# Patient Record
Sex: FEMALE | Race: WHITE | NOT HISPANIC OR LATINO | Employment: OTHER | URBAN - METROPOLITAN AREA
[De-identification: names, ages, dates, MRNs, and addresses within clinical notes are randomized per-mention and may not be internally consistent; named-entity substitution may affect disease eponyms.]

---

## 2018-08-05 ENCOUNTER — HOSPITAL ENCOUNTER (EMERGENCY)
Facility: HOSPITAL | Age: 68
Discharge: HOME | End: 2018-08-05
Attending: EMERGENCY MEDICINE
Payer: MEDICARE

## 2018-08-05 VITALS
HEART RATE: 94 BPM | HEIGHT: 66 IN | WEIGHT: 155 LBS | BODY MASS INDEX: 24.91 KG/M2 | DIASTOLIC BLOOD PRESSURE: 85 MMHG | OXYGEN SATURATION: 99 % | RESPIRATION RATE: 18 BRPM | SYSTOLIC BLOOD PRESSURE: 124 MMHG | TEMPERATURE: 98 F

## 2018-08-05 DIAGNOSIS — M79.672 PAIN OF LEFT HEEL: Primary | ICD-10-CM

## 2018-08-05 PROCEDURE — 99283 EMERGENCY DEPT VISIT LOW MDM: CPT | Mod: ,,, | Performed by: EMERGENCY MEDICINE

## 2018-08-05 PROCEDURE — 25000003 PHARM REV CODE 250: Performed by: EMERGENCY MEDICINE

## 2018-08-05 PROCEDURE — 99283 EMERGENCY DEPT VISIT LOW MDM: CPT | Mod: 25

## 2018-08-05 RX ORDER — IBUPROFEN 800 MG/1
800 TABLET ORAL EVERY 8 HOURS PRN
Qty: 20 TABLET | Refills: 0 | Status: SHIPPED | OUTPATIENT
Start: 2018-08-05 | End: 2020-03-10

## 2018-08-05 RX ORDER — METFORMIN HYDROCHLORIDE 1000 MG/1
1000 TABLET ORAL 2 TIMES DAILY WITH MEALS
COMMUNITY

## 2018-08-05 RX ORDER — IBUPROFEN 400 MG/1
800 TABLET ORAL
Status: COMPLETED | OUTPATIENT
Start: 2018-08-05 | End: 2018-08-05

## 2018-08-05 RX ORDER — ATORVASTATIN CALCIUM 10 MG/1
10 TABLET, FILM COATED ORAL DAILY
COMMUNITY

## 2018-08-05 RX ORDER — LOSARTAN POTASSIUM 50 MG/1
50 TABLET ORAL DAILY
COMMUNITY
End: 2019-04-24

## 2018-08-05 RX ADMIN — IBUPROFEN 800 MG: 400 TABLET, FILM COATED ORAL at 03:08

## 2018-08-05 NOTE — ED TRIAGE NOTES
Presents to ER with complaint of pain and swelling to her left foot and ankle for 2 weeks.  Denies injury.  Patient's name and date of birth checked and is correct.  LOC: The patient is awake, alert and aware of environment with an appropriate affect, the patient is oriented x 3 and speaking appropriately.  APPEARANCE: Patient resting comfortably and in no acute distress, patient is clean and well groomed, patient's clothing is properly fastened.  CARDIOVASCULAR:  Heart rate regular and even with no peripheral edema noted.  SKIN: The skin is warm and dry, patient has normal skin turgor and moist mucus membranes, skin intact, no breakdown or brusing noted. MUSKULOSKELETAL: Patient moving all extremities well, obvious swelling and deformities noted left foot.  RESPIRATORY: Airway is open and patent, respirations are spontaneous, patient has a normal effort and rate.

## 2018-08-05 NOTE — ED PROVIDER NOTES
"Encounter Date: 8/5/2018    SCRIBE #1 NOTE: IPeng, am scribing for, and in the presence of,  Dr. Haynes. I have scribed the following portions of the note - the APC attestation.   SCRIBE #2 NOTE: I, Sherry Marquita, am scribing for, and in the presence of,  Dr. Haynes . I have scribed the following portions of the note - Other sections scribed: Reassessment .     History     Chief Complaint   Patient presents with    Foot Pain     denies injury, started 2 weeks ago     Time seen by provider: 2:58 PM    This is a 68 y.o. female with co-morbidities of diabetes and hypertension who presents to the ED with complaint of atraumatic, aching left foot pain. Patient reports severe, throbbing pain that has been constant for 2 weeks. Patient states that the left foot pain is worst when she first stands up, described as a "stone" beneath her mid foot. Exacerbating factors include walking. She does not feel the pain is worst at any time of day. She has had similar pain in the past. Patient states taking ibuprofen only for the last few days.       The history is provided by the patient and medical records.     Review of patient's allergies indicates:  No Known Allergies  Past Medical History:   Diagnosis Date    Diabetes     Hypercholesteremia     Hypertension     Hypothyroidism     Kidney stones      Past Surgical History:   Procedure Laterality Date    ABDOMINAL HERNIA REPAIR      BELT ABDOMINOPLASTY      CHOLECYSTECTOMY      COLECTOMY      partial      No family history on file.  Social History   Substance Use Topics    Smoking status: Not on file    Smokeless tobacco: Not on file    Alcohol use Not on file     Review of Systems   Constitutional: Negative for chills, diaphoresis and fever.   Respiratory: Negative for shortness of breath.    Musculoskeletal: Positive for arthralgias.   Skin: Negative for wound.       Physical Exam     Initial Vitals [08/05/18 1332]   BP Pulse Resp Temp SpO2   124/85 94 " 18 98.4 °F (36.9 °C) 99 %      MAP       --         Physical Exam    Nursing note and vitals reviewed.  Constitutional: She appears well-developed and well-nourished. She is not diaphoretic. No distress.   Pulmonary/Chest: Breath sounds normal. No respiratory distress.   Musculoskeletal:   No calf asymmetry or tenderness. No tenderness along the medial or lateral malleolus. No tenderness at the base of the 5th metatarsal. No tenderness at the medial aspect of the heel as well as the inferior plantar aspect along the lateral heel. No gross deformities. Peripheral pulses are intact. Kwok's test is negative.    Neurological: She is alert and oriented to person, place, and time.         ED Course   Procedures  Labs Reviewed - No data to display       Imaging Results          X-Ray Ankle Complete Left (Final result)  Result time 08/05/18 16:03:45    Final result by Luther Alba III, MD (08/05/18 16:03:45)                 Impression:      See above      Electronically signed by: Luther Alba MD  Date:    08/05/2018  Time:    16:03             Narrative:    EXAMINATION:  XR ANKLE COMPLETE 3 VIEW LEFT    CLINICAL HISTORY:  Pain in left foot    FINDINGS:  No fracture dislocation bone destruction or OCD seen.                               X-Ray Foot Complete Left (Final result)  Result time 08/05/18 16:04:11    Final result by Luther Alba III, MD (08/05/18 16:04:11)                 Impression:      See above      Electronically signed by: Luther Alba MD  Date:    08/05/2018  Time:    16:04             Narrative:    EXAMINATION:  XR FOOT COMPLETE 3 VIEW LEFT    CLINICAL HISTORY:  Pain in left foot    FINDINGS:  There is DJD and spurs on the calcaneus.  No fracture dislocation bone destruction or coalition seen.                                 Medical Decision Making:   History:   Old Medical Records: I decided to obtain old medical records.  Initial Assessment:   68 y.o. female presents with left heel pain.  No trauma, doubt fracture, but will obtain plain films. Kwok's test is negative, doubt achilles tendon rupture. No evidence of abscess or hematoma, likely consistent with plantar fasciitis, will administer ice and NSAIDs.     Reassessment: X-Rays reviewed. Patient provided with NSAIDs, instructions to RICE, and follow up with Podiatry.   Clinical Tests:   Radiological Study: Ordered and Reviewed            Scribe Attestation:   Scribe #1: I performed the above scribed service and the documentation accurately describes the services I performed. I attest to the accuracy of the note.  Scribe #2: I performed the above scribed service and the documentation accurately describes the services I performed. I attest to the accuracy of the note.    Attending Attestation:           Physician Attestation for Scribe:      Comments: I, Dr. Phoenix Haynes, personally performed the services described in this documentation. All medical record entries made by the scribe were at my direction and in my presence.  I have reviewed the chart and agree that the record reflects my personal performance and is accurate and complete. Phoenix Haynes MD.  10:02 PM 08/05/2018                 Clinical Impression:   The encounter diagnosis was Pain of left heel.      Disposition:   Disposition: Discharged  Condition: Stable                        Phoenix Haynes MD  08/05/18 [id number]

## 2018-11-07 ENCOUNTER — OFFICE VISIT (OUTPATIENT)
Dept: SPORTS MEDICINE | Facility: CLINIC | Age: 68
End: 2018-11-07
Payer: MEDICARE

## 2018-11-07 ENCOUNTER — CLINICAL SUPPORT (OUTPATIENT)
Dept: CARDIOLOGY | Facility: CLINIC | Age: 68
End: 2018-11-07
Attending: FAMILY MEDICINE
Payer: MEDICARE

## 2018-11-07 ENCOUNTER — HOSPITAL ENCOUNTER (OUTPATIENT)
Dept: RADIOLOGY | Facility: HOSPITAL | Age: 68
Discharge: HOME | End: 2018-11-07
Attending: FAMILY MEDICINE
Payer: MEDICARE

## 2018-11-07 VITALS — WEIGHT: 155 LBS | HEIGHT: 66 IN | TEMPERATURE: 98 F | BODY MASS INDEX: 24.91 KG/M2

## 2018-11-07 DIAGNOSIS — R53.81 PHYSICAL DECONDITIONING: ICD-10-CM

## 2018-11-07 DIAGNOSIS — M25.562 PAIN IN BOTH KNEES, UNSPECIFIED CHRONICITY: ICD-10-CM

## 2018-11-07 DIAGNOSIS — M79.662 PAIN OF LEFT CALF: ICD-10-CM

## 2018-11-07 DIAGNOSIS — M25.561 PAIN IN BOTH KNEES, UNSPECIFIED CHRONICITY: ICD-10-CM

## 2018-11-07 DIAGNOSIS — M17.0 PRIMARY OSTEOARTHRITIS OF KNEES, BILATERAL: Primary | ICD-10-CM

## 2018-11-07 PROCEDURE — 20611 DRAIN/INJ JOINT/BURSA W/US: CPT | Mod: 50,S$GLB,, | Performed by: FAMILY MEDICINE

## 2018-11-07 PROCEDURE — 1101F PT FALLS ASSESS-DOCD LE1/YR: CPT | Mod: CPTII,S$GLB,, | Performed by: FAMILY MEDICINE

## 2018-11-07 PROCEDURE — 73564 X-RAY EXAM KNEE 4 OR MORE: CPT | Mod: 26,LT,, | Performed by: RADIOLOGY

## 2018-11-07 PROCEDURE — 99204 OFFICE O/P NEW MOD 45 MIN: CPT | Mod: 25,S$GLB,, | Performed by: FAMILY MEDICINE

## 2018-11-07 PROCEDURE — 93971 EXTREMITY STUDY: CPT | Mod: LT

## 2018-11-07 PROCEDURE — 73564 X-RAY EXAM KNEE 4 OR MORE: CPT | Mod: TC,50,FY,PO

## 2018-11-07 PROCEDURE — 93971 EXTREMITY STUDY: CPT | Mod: 26,LT,S$GLB, | Performed by: INTERNAL MEDICINE

## 2018-11-07 PROCEDURE — 99999 PR PBB SHADOW E&M-EST. PATIENT-LVL III: CPT | Mod: PBBFAC,,, | Performed by: FAMILY MEDICINE

## 2018-11-07 PROCEDURE — 73564 X-RAY EXAM KNEE 4 OR MORE: CPT | Mod: 26,RT,, | Performed by: RADIOLOGY

## 2018-11-07 RX ORDER — TRIAMCINOLONE ACETONIDE 40 MG/ML
40 INJECTION, SUSPENSION INTRA-ARTICULAR; INTRAMUSCULAR
Status: DISCONTINUED | OUTPATIENT
Start: 2018-11-07 | End: 2018-11-07 | Stop reason: HOSPADM

## 2018-11-07 RX ADMIN — TRIAMCINOLONE ACETONIDE 40 MG: 40 INJECTION, SUSPENSION INTRA-ARTICULAR; INTRAMUSCULAR at 09:11

## 2018-11-07 NOTE — PROCEDURES
"Large Joint Aspiration/Injection: R knee, L knee  Date/Time: 11/7/2018 9:59 AM  Performed by: Kendrick Freire MD  Authorized by: Kendrick Freire MD     Consent Done?:  Yes (Verbal)  Indications:  Pain  Procedure site marked: Yes    Timeout: Prior to procedure the correct patient, procedure, and site was verified      Location:  Knee  Site:  R knee and L knee  Prep: Patient was prepped and draped in usual sterile fashion    Ultrasonic Guidance for needle placement: Yes  Images are saved and documented.  Needle size:  20 G  Approach:  Lateral  Medications:  40 mg triamcinolone acetonide 40 mg/mL; 40 mg triamcinolone acetonide 40 mg/mL  Patient tolerance:  Patient tolerated the procedure well with no immediate complications    Additional Comments: Description of ultrasound utilization for needle guidance:   Ultrasound guidance used for needle localization. Images saved and stored for documentation. The knee joint was visualized. Dynamic visualization of the 20g x 1.5" needle was continuous throughout the procedure.      "

## 2018-11-07 NOTE — PROGRESS NOTES
Sagrario Freire, a 68 y.o. female, presents today for evaluation of her LEFT and RIGHT knee.      History of Present Illness (HPI)  Location: medial/posterior knee, L>R  Onset: Chronic, >2 years  Palliative:    Relative rest   Oral analgesics   CSI, non [facility] about 2 years ago  Provocative:    ADLS   Prolonged ambulation  Prior: none  Progression: Worsening discomfort  Quality:    sharp pain  Radiation: none  Severity: per nursing documentation  Timing: intermittent w/ use  Trauma: none    Review of Systems (ROS)  A 10+ review of systems was performed with pertinent positives and negatives noted above in the history of present illness. Other systems were negative unless otherwise specified.    Physical Examination (PE)  General:  The patient is alert and oriented x 3. Mood is pleasant. Observation of ears, eyes and nose reveal no gross abnormalities. HEENT: NCAT, sclera anicteric.   Lungs: Respirations are equal and unlabored.  Gait is coordinated. Patient can toe walk and heel walk without difficulty.    LEFT KNEE EXAMINATION    Observation/Inspection  Gait:   Nonantalgic   Alignment:  Neutral   Scars:   None   Muscle atrophy: Mild  Effusion:  None   Warmth:  None   Discoloration:   none     Tenderness / Crepitus (T / C):         T / C      T / C  Patella   - / -   Lateral joint line   - / -     Peripatellar medial  -  Medial joint line    + / -  Peripatellar lateral -  Medial plica   - / -  Patellar tendon -   Popliteal fossa   + / -  Quad tendon   -   Gastrocnemius   -  Prepatellar Bursa - / -   Quadricep   -  Tibial tubercle  -  Thigh/hamstring  -  Pes anserine/HS -  Fibula    -  ITB   - / -  Tibia     -  Tib/fib joint  - / -  LCL    -    MFC   - / -   MCL: Proximal  -    LFC   - / -   Distal    -          ROM: (* = pain)  PASSIVE   ACTIVE    Left :   5 / 0 / 145   5 / 0 / 145     Right :    5 / 0 / 145   5 / 0 / 145    Patellofemoral examination:  See above noted areas of tenderness.   Patella position     Subluxation / dislocation: Centered        Sup. / Inf;   Normal   Crepitus (PF):    Absent   Patellar Mobility:       Medial-lateral:   Normal    Superior-inferior:  Normal    Inferior tilt   Normal    Patellar tendon:  Normal   Lateral tilt:    Normal   J-sign:     None   Patellofemoral grind:   No pain     Meniscal Signs:     Pain on terminal extension:  +  Pain on terminal flexion:  +  Andressas maneuver:  +*  Squat     NT    Ligament Examination:  ACL / Lachman:  WNL  PCL-Post.  drawer: normal 0 to 2mm  MCL- Valgus:  normal 0 to 2mm  LCL- Varus:    normal 0 to 2mm  Pivot shift:  guarding   Dial Test:   difference c/w other side   At 30° flexion: normal (< 5°)    At 90° flexion: normal (< 5°)   Reverse Pivot Shift:   normal (Equal)     Strength: (* = with pain) Painful Side  Quadriceps   5/5  Hamstrin/5    Extremity Neuro-vascular Examination:   Sensation:  Grossly intact to light touch all dermatomal regions.   Motor Function:  Fully intact motor function at hip, knee, foot and ankle    DTRs;  quadriceps and  achilles 2+.  No clonus and downgoing Babinski.    Vascular status:  DP and PT pulses 2+, brisk capillary refill, symmetric.     Other Findings:    ASSESSMENT & PLAN  Assessment:   #1 Kellgren-Jeyson Grade III osteoarthritis of knee, edward med compartment, bilat  #2 calf pain, left   US blood flow study ordered today to r/o vascular    No evidence of neurologic pathology    Imaging studies reviewed:   X-ray knee, bilateral 18.11    Plan:    We discussed the importance of appropriate diet, weight, and regular exercise including quadriceps strengthening     We discussed options including:  #1 watchful waiting  #2 physical therapy aimed at:   Core stability   RoM knee   Strengthening quadriceps   Gait training   #3 injection therapy:   CSI iaknee     Right,     Left,    VSI iaknee    Right,     Left,    Orthobiologics   #4 consultation      The patient chooses #2 and #3 csi iaknee bilat    Pain  management: handout given  Bracing:   Physical therapy: fPT, @ [facility], begin as above   Activity (e.g. 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

## 2019-01-03 ENCOUNTER — OFFICE VISIT (OUTPATIENT)
Dept: SPORTS MEDICINE | Facility: CLINIC | Age: 69
End: 2019-01-03
Payer: MEDICARE

## 2019-01-03 VITALS — TEMPERATURE: 98 F | BODY MASS INDEX: 24.91 KG/M2 | HEIGHT: 66 IN | WEIGHT: 155 LBS

## 2019-01-03 DIAGNOSIS — M17.0 PRIMARY OSTEOARTHRITIS OF BOTH KNEES: Primary | ICD-10-CM

## 2019-01-03 DIAGNOSIS — M17.0 PRIMARY OSTEOARTHRITIS OF KNEES, BILATERAL: Primary | ICD-10-CM

## 2019-01-03 DIAGNOSIS — R53.81 PHYSICAL DECONDITIONING: ICD-10-CM

## 2019-01-03 PROCEDURE — 99999 PR PBB SHADOW E&M-EST. PATIENT-LVL III: ICD-10-PCS | Mod: PBBFAC,,, | Performed by: FAMILY MEDICINE

## 2019-01-03 PROCEDURE — 99999 PR PBB SHADOW E&M-EST. PATIENT-LVL III: CPT | Mod: PBBFAC,,, | Performed by: FAMILY MEDICINE

## 2019-01-03 PROCEDURE — 1101F PR PT FALLS ASSESS DOC 0-1 FALLS W/OUT INJ PAST YR: ICD-10-PCS | Mod: CPTII,S$GLB,, | Performed by: FAMILY MEDICINE

## 2019-01-03 PROCEDURE — 99214 PR OFFICE/OUTPT VISIT, EST, LEVL IV, 30-39 MIN: ICD-10-PCS | Mod: S$GLB,,, | Performed by: FAMILY MEDICINE

## 2019-01-03 PROCEDURE — 99214 OFFICE O/P EST MOD 30 MIN: CPT | Mod: S$GLB,,, | Performed by: FAMILY MEDICINE

## 2019-01-03 PROCEDURE — 1101F PT FALLS ASSESS-DOCD LE1/YR: CPT | Mod: CPTII,S$GLB,, | Performed by: FAMILY MEDICINE

## 2019-01-03 NOTE — PROGRESS NOTES
Sagrario Freire, a 68 y.o. female, presents today for evaluation of her LEFT and RIGHT knee.      History of Present Illness (HPI)  Location: medial/posterior knee, L>R  Onset: Chronic, >2 years  Palliative:    Relative rest   Oral analgesics   CSI, non [facility] about 2 years ago   CSI, [place], 18.11.07, 90% improvement   fPT @[facility] - has not started yet  Provocative: Resolved   ADLS   Prolonged ambulation  Prior: none  Progression: Resolving discomfort  Quality:    Resolved    sharp pain   Muscle spasms at night  Radiation: none  Severity: per nursing documentation  Timing: intermittent w/ use  Trauma: none    Review of Systems (ROS)  A 10+ review of systems was performed with pertinent positives and negatives noted above in the history of present illness. Other systems were negative unless otherwise specified.    Physical Examination (PE)  General:  The patient is alert and oriented x 3. Mood is pleasant. Observation of ears, eyes and nose reveal no gross abnormalities. HEENT: NCAT, sclera anicteric.   Lungs: Respirations are equal and unlabored.  Gait is coordinated. Patient can toe walk and heel walk without difficulty.    KIEL KNEE EXAMINATION    Observation/Inspection  Gait:   Nonantalgic   Alignment:  Neutral   Scars:   None   Muscle atrophy: Mild  Effusion:  None   Warmth:  None   Discoloration:   none     Tenderness / Crepitus (T / C):         T / C      T / C  Patella   - / -   Lateral joint line   - / -     Peripatellar medial  -  Medial joint line    + / - resolved  Peripatellar lateral -  Medial plica   - / -  Patellar tendon -   Popliteal fossa   + / - Resolved  Quad tendon   -   Gastrocnemius   -  Prepatellar Bursa - / -   Quadricep   -  Tibial tubercle  -  Thigh/hamstring  -  Pes anserine/HS -  Fibula    -  ITB   - / -  Tibia     -  Tib/fib joint  - / -  LCL    -    MFC   - / -   MCL: Proximal  -    LFC   - / -   Distal    -          ROM: (* = pain)  PASSIVE   ACTIVE    Left :   5 / 0 / 145   5 / 0 /  145     Right :    5 / 0 / 145   5 / 0 / 145    Patellofemoral examination:  See above noted areas of tenderness.   Patella position    Subluxation / dislocation: Centered        Sup. / Inf;   Normal   Crepitus (PF):    Absent   Patellar Mobility:       Medial-lateral:   Normal    Superior-inferior:  Normal    Inferior tilt   Normal    Patellar tendon:  Normal   Lateral tilt:    Normal   J-sign:     None   Patellofemoral grind:   No pain     Meniscal Signs:   RESOLVED  Pain on terminal extension:  +   Pain on terminal flexion:  +  Andressas maneuver:  +*  Squat     NT    Ligament Examination:  ACL / Lachman:  WNL  PCL-Post.  drawer: normal 0 to 2mm  MCL- Valgus:  normal 0 to 2mm  LCL- Varus:    normal 0 to 2mm  Pivot shift:  guarding   Dial Test:   difference c/w other side   At 30° flexion: normal (< 5°)    At 90° flexion: normal (< 5°)   Reverse Pivot Shift:   normal (Equal)     Strength: (* = with pain) Painful Side  Quadriceps   5/5  Hamstrin/5    Extremity Neuro-vascular Examination:   Sensation:  Grossly intact to light touch all dermatomal regions.   Motor Function:  Fully intact motor function at hip, knee, foot and ankle    DTRs;  quadriceps and  achilles 2+.  No clonus and downgoing Babinski.    Vascular status:  DP and PT pulses 2+, brisk capillary refill, symmetric.     Other Findings:    ASSESSMENT & PLAN  Assessment:   #1 Kellgren-Jeyson Grade III osteoarthritis of knee, edward med compartment, bilat  #2 calf pain, left   US negative on 18..07    No evidence of neurologic pathology    Imaging studies reviewed:   X-ray knee, bilateral     Plan:    We discussed the importance of appropriate diet, weight, and regular exercise including quadriceps strengthening     We discussed options including:  #1 watchful waiting  #2 physical therapy aimed at:   Core stability   RoM knee   Strengthening quadriceps   Gait training   #3 injection therapy:   ALISAI phil     Right, effective good%,  repeat    Left, effective good%, repeat   VSI iaknee    Right,     Left,    Orthobiologics   #4 consultation      The patient chooses #2    Pain management: handout given  Bracing:   Physical therapy: fPT, @ [facility], begin as above   Activity (e.g. 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

## 2019-01-15 ENCOUNTER — CLINICAL SUPPORT (OUTPATIENT)
Dept: REHABILITATION | Facility: HOSPITAL | Age: 69
End: 2019-01-15
Attending: FAMILY MEDICINE
Payer: MEDICARE

## 2019-01-15 DIAGNOSIS — M25.562 BILATERAL CHRONIC KNEE PAIN: ICD-10-CM

## 2019-01-15 DIAGNOSIS — G89.29 BILATERAL CHRONIC KNEE PAIN: ICD-10-CM

## 2019-01-15 DIAGNOSIS — M25.60 RANGE OF MOTION DEFICIT: ICD-10-CM

## 2019-01-15 DIAGNOSIS — R26.2 DIFFICULTY WALKING: ICD-10-CM

## 2019-01-15 DIAGNOSIS — M25.561 BILATERAL CHRONIC KNEE PAIN: ICD-10-CM

## 2019-01-15 DIAGNOSIS — R53.1 WEAKNESS: ICD-10-CM

## 2019-01-15 PROCEDURE — 97110 THERAPEUTIC EXERCISES: CPT

## 2019-01-15 PROCEDURE — 97161 PT EVAL LOW COMPLEX 20 MIN: CPT

## 2019-01-22 ENCOUNTER — CLINICAL SUPPORT (OUTPATIENT)
Dept: REHABILITATION | Facility: HOSPITAL | Age: 69
End: 2019-01-22
Attending: FAMILY MEDICINE
Payer: MEDICARE

## 2019-01-22 DIAGNOSIS — M25.562 BILATERAL CHRONIC KNEE PAIN: ICD-10-CM

## 2019-01-22 DIAGNOSIS — G89.29 BILATERAL CHRONIC KNEE PAIN: ICD-10-CM

## 2019-01-22 DIAGNOSIS — M25.60 RANGE OF MOTION DEFICIT: ICD-10-CM

## 2019-01-22 DIAGNOSIS — R53.1 WEAKNESS: ICD-10-CM

## 2019-01-22 DIAGNOSIS — M25.561 BILATERAL CHRONIC KNEE PAIN: ICD-10-CM

## 2019-01-22 DIAGNOSIS — R26.2 DIFFICULTY WALKING: ICD-10-CM

## 2019-01-22 PROCEDURE — 97110 THERAPEUTIC EXERCISES: CPT

## 2019-01-22 PROCEDURE — 97140 MANUAL THERAPY 1/> REGIONS: CPT

## 2019-01-22 NOTE — PROGRESS NOTES
"  Physical Therapy Daily Treatment Note     Name: Sagrario Freire  Clinic Number: [id number]    Therapy Diagnosis:   Encounter Diagnoses   Name Primary?    Bilateral chronic knee pain     Difficulty walking     Weakness     Range of motion deficit      Physician: Kendrick Freire, *    Visit Date: 1/22/2019    Physician Orders: PT Eval and Treat   Medical Diagnosis from Referral: M17.0 (ICD-10-CM) - Primary osteoarthritis of knees, bilateral  Evaluation Date: 1/15/2019  Authorization Period Expiration: 1/3/2020  Plan of Care Expiration: 2/26/2019  Visit # / Visits authorized: 2/ 1 (pending)      Time In: 10:10  Time Out: 11:10  Total Billable Time: 30 minutes     Precautions: Diabetes    Subjective     Pt reports: She felt well after her initial eval; no issues with HEP. Her (L) knee is still hurting, currenlty 3-4/10.  She was compliant with home exercise program.  Response to previous treatment: Tolerated well  Functional change: NA    Pain: 4/10  Location: left knee      Objective     Sagrario received therapeutic exercises to develop strength and flexibility for 22 minutes including:  - long-sitting HS stretch + quad set 5" hold 30x (B)  - supine SLR + QS 3x10 (B)  - SL clamshells 3x10 (B)  - supine bridging c pilates ring 3x10   - recumbent bike for increased ROM/circulation 5 min timed.     Sagrario received the following manual therapy techniques: Joint mobilizations and Soft tissue Mobilization were applied to the: (B) patellofemoral joint for 8 minutes, including:  - patellar assessment  - STM: (L) infrapatellar fat pad       Sagrario received cold pack for 0 minutes to L knee.      Home Exercises Provided and Patient Education Provided     Education provided:   - Review of POC  - exercise rationale.     Written Home Exercises Provided: Patient instructed to cont prior HEP.  Exercises were reviewed and Sagrario was able to demonstrate them prior to the end of the session.  Sagrario demonstrated good  " understanding of the education provided.     See EMR under Media for exercises provided prior visit.    Assessment     Initiated beginning exercise program to address hip and knee muscle strength deficits with moderate soreness/fatigue noted. No symptom aggravation noted during session. HEP will be updated next session after determining how patient responds to current treatment after leaving. Patient reported feeling well upon departure.       Sagrario is a 68 y.o. female referred to outpatient Physical Therapy with a medical diagnosis of M17.0 (ICD-10-CM) - Primary osteoarthritis of knees, bilateral. Pt presents with gait abnormalities, ROM restrictions, significant strength deficits of hip and knee musculature, and flexibility loss contributing to decreased functional mobility tolerance and increased pain.   Sagrario is progressing well towards her goals.   Pt prognosis is Good.     Pt will continue to benefit from skilled outpatient physical therapy to address the deficits listed in the problem list box on initial evaluation, provide pt/family education and to maximize pt's level of independence in the home and community environment.     Pt's spiritual, cultural and educational needs considered and pt agreeable to plan of care and goals.    Anticipated barriers to physical therapy: None    Goals:  Short Term Goals: 2 weeks   - Pt will demonstrate excellent knowledge and adherence to HEP to facilitate optimal recovery.     Long Term Goals: 6 weeks   - Pt will demonstrate hip muscle MMT of 4+/5 or greater to increased stability needed for functional activity.   - Pt will demonstrate knee muscle MMT of 5/5 or greater to increased stability needed for functional activity.   - Pt will demonstrate HS 90/90 on LLE of 20 deg lacking or less for increased mobility tolerance.   - Pt will report FOTO score of 50% limited or less indicating clinically relevant improvement in functional tolerance.     Plan     Continue to  progress per current plan as tolerated. Update HEP.     Estela Moreno, PT, DPT

## 2019-01-24 ENCOUNTER — CLINICAL SUPPORT (OUTPATIENT)
Dept: REHABILITATION | Facility: HOSPITAL | Age: 69
End: 2019-01-24
Attending: FAMILY MEDICINE
Payer: MEDICARE

## 2019-01-24 DIAGNOSIS — M25.561 BILATERAL CHRONIC KNEE PAIN: ICD-10-CM

## 2019-01-24 DIAGNOSIS — R53.1 WEAKNESS: ICD-10-CM

## 2019-01-24 DIAGNOSIS — M25.60 RANGE OF MOTION DEFICIT: ICD-10-CM

## 2019-01-24 DIAGNOSIS — R26.2 DIFFICULTY WALKING: ICD-10-CM

## 2019-01-24 DIAGNOSIS — G89.29 BILATERAL CHRONIC KNEE PAIN: ICD-10-CM

## 2019-01-24 DIAGNOSIS — M25.562 BILATERAL CHRONIC KNEE PAIN: ICD-10-CM

## 2019-01-24 PROCEDURE — 97140 MANUAL THERAPY 1/> REGIONS: CPT

## 2019-01-24 PROCEDURE — 97110 THERAPEUTIC EXERCISES: CPT

## 2019-01-24 NOTE — PROGRESS NOTES
"  Physical Therapy Daily Treatment Note     Name: Sagrario Freire  Clinic Number: [id number]    Therapy Diagnosis:   Encounter Diagnoses   Name Primary?    Bilateral chronic knee pain     Difficulty walking     Weakness     Range of motion deficit      Physician: Kendrick Freire, *    Visit Date: 1/24/2019    Physician Orders: PT Eval and Treat   Medical Diagnosis from Referral: M17.0 (ICD-10-CM) - Primary osteoarthritis of knees, bilateral  Evaluation Date: 1/15/2019  Authorization Period Expiration: 1/3/2020  Plan of Care Expiration: 2/26/2019  Visit # / Visits authorized: 3/ 1 (pending)      Time In: 10:00  Time Out: 10:50  Total Billable Time: 38 minutes     Precautions: Diabetes    Subjective     Pt reports: She was sore after last session, but she feels better today.   She was compliant with home exercise program.  Response to previous treatment: Tolerated well  Functional change: decreased pain, post-exercise soreness.     Pain: 2/10  Location: left knee      Objective     Sagrario received therapeutic exercises to develop strength and flexibility for 30 minutes including:  - long-sitting HS stretch + quad set 5" hold 30x (B)  - supine SLR + QS 3x10 (B)  - SL clamshells 3x10 (B)  - supine bridging c pilates ring 3x10   - recumbent bike for increased ROM/circulation 5 min timed.     Sagrario received the following manual therapy techniques: Joint mobilizations and Soft tissue Mobilization were applied to the: (B) patellofemoral joint for 8 minutes, including:  - patellar glides 4-way on (L)  - STM: (L) infrapatellar fat pad       Sagrario received cold pack for 0 minutes to L knee.      Home Exercises Provided and Patient Education Provided     Education provided:   - Review of POC  - exercise rationale.     Written Home Exercises Provided: Patient instructed to cont prior HEP.  Exercises were reviewed and Sagrario was able to demonstrate them prior to the end of the session.  Sagrario demonstrated good  " understanding of the education provided.     See EMR under Media for exercises provided prior visit.    Assessment     No progressions made to exercise program today due to pt being sore after last session; discussed progressing exercise program slowly starting next week. Improved tolerance to STM for fat pat noted compared to last session. Good patellar mobility of (L) knee noted. Patient reported feeling well upon departure.       Sagrario is a 68 y.o. female referred to outpatient Physical Therapy with a medical diagnosis of M17.0 (ICD-10-CM) - Primary osteoarthritis of knees, bilateral. Pt presents with gait abnormalities, ROM restrictions, significant strength deficits of hip and knee musculature, and flexibility loss contributing to decreased functional mobility tolerance and increased pain.   Sagrario is progressing well towards her goals.   Pt prognosis is Good.     Pt will continue to benefit from skilled outpatient physical therapy to address the deficits listed in the problem list box on initial evaluation, provide pt/family education and to maximize pt's level of independence in the home and community environment.     Pt's spiritual, cultural and educational needs considered and pt agreeable to plan of care and goals.    Anticipated barriers to physical therapy: None    Goals:  Short Term Goals: 2 weeks   - Pt will demonstrate excellent knowledge and adherence to HEP to facilitate optimal recovery.     Long Term Goals: 6 weeks   - Pt will demonstrate hip muscle MMT of 4+/5 or greater to increased stability needed for functional activity.   - Pt will demonstrate knee muscle MMT of 5/5 or greater to increased stability needed for functional activity.   - Pt will demonstrate HS 90/90 on LLE of 20 deg lacking or less for increased mobility tolerance.   - Pt will report FOTO score of 50% limited or less indicating clinically relevant improvement in functional tolerance.     Plan     Continue to progress per  current plan as tolerated. Update HEP.     Etsela Moreno, PT, DPT

## 2019-01-31 ENCOUNTER — CLINICAL SUPPORT (OUTPATIENT)
Dept: REHABILITATION | Facility: HOSPITAL | Age: 69
End: 2019-01-31
Attending: FAMILY MEDICINE
Payer: MEDICARE

## 2019-01-31 DIAGNOSIS — G89.29 BILATERAL CHRONIC KNEE PAIN: ICD-10-CM

## 2019-01-31 DIAGNOSIS — M25.60 RANGE OF MOTION DEFICIT: ICD-10-CM

## 2019-01-31 DIAGNOSIS — M25.561 BILATERAL CHRONIC KNEE PAIN: ICD-10-CM

## 2019-01-31 DIAGNOSIS — M25.562 BILATERAL CHRONIC KNEE PAIN: ICD-10-CM

## 2019-01-31 DIAGNOSIS — R53.1 WEAKNESS: ICD-10-CM

## 2019-01-31 DIAGNOSIS — R26.2 DIFFICULTY WALKING: ICD-10-CM

## 2019-01-31 PROCEDURE — 97110 THERAPEUTIC EXERCISES: CPT

## 2019-01-31 PROCEDURE — 97140 MANUAL THERAPY 1/> REGIONS: CPT

## 2019-01-31 NOTE — PROGRESS NOTES
"  Physical Therapy Daily Treatment Note     Name: Sagrario Freire  Clinic Number: [id number]    Therapy Diagnosis:   Encounter Diagnoses   Name Primary?    Bilateral chronic knee pain     Difficulty walking     Weakness     Range of motion deficit      Physician: Kendrick Freire, *    Visit Date: 1/31/2019    Physician Orders: PT Eval and Treat   Medical Diagnosis from Referral: M17.0 (ICD-10-CM) - Primary osteoarthritis of knees, bilateral  Evaluation Date: 1/15/2019  Authorization Period Expiration: 1/3/2020  Plan of Care Expiration: 2/26/2019  Visit # / Visits authorized: 4/ 1 (pending)      Time In: 10:00  Time Out: 10:50  Total Billable Time: 38 minutes     Precautions: Diabetes    Subjective     Pt reports: She has been sore since her last session, mainly in her (L) hamstring and occasionally below (L) patella, causing her to have difficulty walking at [place].    She was compliant with home exercise program.  Response to previous treatment: Tolerated well  Functional change: decreased pain, post-exercise soreness.     Pain: NT  Location: left knee      Objective     Sagrario received therapeutic exercises to develop strength and flexibility for 15 minutes including:    - HS stretch at step 3x30 sec B   - quad set (without HS stretch) 30x  - SAQ on bolster 20x    - long-sitting HS stretch + quad set 5" hold 30x (B)  - supine SLR + QS 3x10 (B)  - SL clamshells 3x10 (B)  - supine bridging c pilates ring 3x10   - recumbent bike for increased ROM/circulation 5 min timed.     Sagrario received the following manual therapy techniques: Joint mobilizations and Soft tissue Mobilization were applied to the: L HS for 30 minutes, including:  - IASTM/STM/sustained pressure: sup/mod tx-depth (L) biceps femoris.       Sagrario received cold pack for 10 minutes to L knee.      Home Exercises Provided and Patient Education Provided     Education provided:   - Review of POC  - exercise rationale.     Written Home " Exercises Provided: Patient instructed to cont prior HEP.  Exercises were reviewed and Sagrario was able to demonstrate them prior to the end of the session.  Sagrario demonstrated good  understanding of the education provided.     See EMR under Media for exercises provided prior visit.    Assessment     Decreased exercise volume today due to increased pain. Significant hypertonicity and soreness noted in (L) biceps femoris, addressed with sup/mod tx-depth STM/IASTM to promote nervous system modulation. Mild pain noted with exercises; advised patient to focus on quad sets and HS stretch to HEP. Utilized ice to decreased post-exercise soreness and pain. No increased symptoms upon departure.       Sagrario is a 68 y.o. female referred to outpatient Physical Therapy with a medical diagnosis of M17.0 (ICD-10-CM) - Primary osteoarthritis of knees, bilateral. Pt presents with gait abnormalities, ROM restrictions, significant strength deficits of hip and knee musculature, and flexibility loss contributing to decreased functional mobility tolerance and increased pain.   Sagrario is progressing well towards her goals.   Pt prognosis is Good.     Pt will continue to benefit from skilled outpatient physical therapy to address the deficits listed in the problem list box on initial evaluation, provide pt/family education and to maximize pt's level of independence in the home and community environment.     Pt's spiritual, cultural and educational needs considered and pt agreeable to plan of care and goals.    Anticipated barriers to physical therapy: None    Goals:  Short Term Goals: 2 weeks   - Pt will demonstrate excellent knowledge and adherence to HEP to facilitate optimal recovery.     Long Term Goals: 6 weeks   - Pt will demonstrate hip muscle MMT of 4+/5 or greater to increased stability needed for functional activity.   - Pt will demonstrate knee muscle MMT of 5/5 or greater to increased stability needed for functional  activity.   - Pt will demonstrate HS 90/90 on LLE of 20 deg lacking or less for increased mobility tolerance.   - Pt will report FOTO score of 50% limited or less indicating clinically relevant improvement in functional tolerance.     Plan     Reassess pain levels, progress exercise as tolerated.     Estela Moreno, PT, DPT

## 2019-02-05 ENCOUNTER — CLINICAL SUPPORT (OUTPATIENT)
Dept: REHABILITATION | Facility: HOSPITAL | Age: 69
End: 2019-02-05
Attending: FAMILY MEDICINE
Payer: MEDICARE

## 2019-02-05 DIAGNOSIS — M25.562 BILATERAL CHRONIC KNEE PAIN: ICD-10-CM

## 2019-02-05 DIAGNOSIS — G89.29 BILATERAL CHRONIC KNEE PAIN: ICD-10-CM

## 2019-02-05 DIAGNOSIS — M25.561 BILATERAL CHRONIC KNEE PAIN: ICD-10-CM

## 2019-02-05 DIAGNOSIS — R26.2 DIFFICULTY WALKING: ICD-10-CM

## 2019-02-05 DIAGNOSIS — R53.1 WEAKNESS: ICD-10-CM

## 2019-02-05 DIAGNOSIS — M25.60 RANGE OF MOTION DEFICIT: ICD-10-CM

## 2019-02-05 PROCEDURE — 97140 MANUAL THERAPY 1/> REGIONS: CPT

## 2019-02-05 PROCEDURE — 97110 THERAPEUTIC EXERCISES: CPT

## 2019-02-05 NOTE — PROGRESS NOTES
"  Physical Therapy Daily Treatment Note     Name: Sagrario Freire  Clinic Number: [id number]    Therapy Diagnosis:   Encounter Diagnoses   Name Primary?    Bilateral chronic knee pain     Difficulty walking     Weakness     Range of motion deficit      Physician: Kendrick Freire, *    Visit Date: 2/5/2019    Physician Orders: PT Eval and Treat   Medical Diagnosis from Referral: M17.0 (ICD-10-CM) - Primary osteoarthritis of knees, bilateral  Evaluation Date: 1/15/2019  Authorization Period Expiration: 1/3/2020  Plan of Care Expiration: 2/26/2019  Visit # / Visits authorized: 5/ 1 (pending)      Time In: 10:00  Time Out: 10:50  Total Billable Time: 30 minutes     Precautions: Diabetes    Subjective     Pt reports: She has been feeling a lot better. She was able to go to [employer] and she felt better after she'd walked around more.   She was compliant with home exercise program.  Response to previous treatment: Tolerated well  Functional change: decreased pain, post-exercise soreness.     Pain: NT  Location: left knee      Objective     Sagrario received therapeutic exercises to develop strength and flexibility for 15 minutes including:    - HS stretch at step 3x30 sec B   - quad set (without HS stretch) 30x  - supine bridging 3x10   - supine SLR + QS 3x10 (B)  - recumbent bike for increased ROM/circulation 8 min timed.     - long-sitting HS stretch + quad set 5" hold 30x (B)  - SL clamshells 3x10 (B)  - supine bridging 3x10       Sagrario received the following manual therapy techniques: Joint mobilizations and Soft tissue Mobilization were applied to the: L HS for 15 minutes, including:  - IASTM/STM/sustained pressure: sup/mod tx-depth (L) biceps femoris.       Sagrario received cold pack for 10 minutes to L knee.      Home Exercises Provided and Patient Education Provided     Education provided:   - Review of POC  - exercise rationale.     Written Home Exercises Provided: Patient instructed to cont prior " HEP.  Exercises were reviewed and Sagrario was able to demonstrate them prior to the end of the session.  Sagrario demonstrated good  understanding of the education provided.     See EMR under Media for exercises provided prior visit.    Assessment     Session started with IASTM and STM/muscle play for decreased HS muscle tone on LLE with moderate tx-depth tolerated. Slight increase in exercise volume performed today with good tolerance noted. VC provided for 50% effort with quad sets to decrease pain. Slight L knee discomfort reported at end of session but no significant symptom provocation reported at end of session.       Sagrario is a 68 y.o. female referred to outpatient Physical Therapy with a medical diagnosis of M17.0 (ICD-10-CM) - Primary osteoarthritis of knees, bilateral. Pt presents with gait abnormalities, ROM restrictions, significant strength deficits of hip and knee musculature, and flexibility loss contributing to decreased functional mobility tolerance and increased pain.   Sagrario is progressing well towards her goals.   Pt prognosis is Good.     Pt will continue to benefit from skilled outpatient physical therapy to address the deficits listed in the problem list box on initial evaluation, provide pt/family education and to maximize pt's level of independence in the home and community environment.     Pt's spiritual, cultural and educational needs considered and pt agreeable to plan of care and goals.    Anticipated barriers to physical therapy: None    Goals:  Short Term Goals: 2 weeks   - Pt will demonstrate excellent knowledge and adherence to HEP to facilitate optimal recovery.     Long Term Goals: 6 weeks   - Pt will demonstrate hip muscle MMT of 4+/5 or greater to increased stability needed for functional activity.   - Pt will demonstrate knee muscle MMT of 5/5 or greater to increased stability needed for functional activity.   - Pt will demonstrate HS 90/90 on LLE of 20 deg lacking or less  for increased mobility tolerance.   - Pt will report FOTO score of 50% limited or less indicating clinically relevant improvement in functional tolerance.     Plan     Progress exercise as tolerated.     Estela Moreno, PT, DPT

## 2019-02-07 ENCOUNTER — CLINICAL SUPPORT (OUTPATIENT)
Dept: REHABILITATION | Facility: HOSPITAL | Age: 69
End: 2019-02-07
Attending: FAMILY MEDICINE
Payer: MEDICARE

## 2019-02-07 DIAGNOSIS — M25.561 BILATERAL CHRONIC KNEE PAIN: ICD-10-CM

## 2019-02-07 DIAGNOSIS — R26.2 DIFFICULTY WALKING: ICD-10-CM

## 2019-02-07 DIAGNOSIS — M25.562 BILATERAL CHRONIC KNEE PAIN: ICD-10-CM

## 2019-02-07 DIAGNOSIS — R53.1 WEAKNESS: ICD-10-CM

## 2019-02-07 DIAGNOSIS — M25.60 RANGE OF MOTION DEFICIT: ICD-10-CM

## 2019-02-07 DIAGNOSIS — G89.29 BILATERAL CHRONIC KNEE PAIN: ICD-10-CM

## 2019-02-07 PROCEDURE — 97110 THERAPEUTIC EXERCISES: CPT

## 2019-02-07 PROCEDURE — 97140 MANUAL THERAPY 1/> REGIONS: CPT

## 2019-02-07 NOTE — PROGRESS NOTES
"  Physical Therapy Daily Treatment Note     Name: Sagrario Freire  Clinic Number: [id number]    Therapy Diagnosis:   Encounter Diagnoses   Name Primary?    Bilateral chronic knee pain     Difficulty walking     Weakness     Range of motion deficit      Physician: Kendrick Freire, *    Visit Date: 2/7/2019    Physician Orders: PT Eval and Treat   Medical Diagnosis from Referral: M17.0 (ICD-10-CM) - Primary osteoarthritis of knees, bilateral  Evaluation Date: 1/15/2019  Authorization Period Expiration: 1/3/2020  Plan of Care Expiration: 2/26/2019  Visit # / Visits authorized: 6/ 1 (pending)      Time In: 10:00  Time Out: 10:50  Total Billable Time: 30 minutes     Precautions: Diabetes    Subjective     Pt reports: She has been continuing to feel better.  She was compliant with home exercise program.  Response to previous treatment: Tolerated well  Functional change: decreased pain, post-exercise soreness.     Pain: NT  Location: left knee      Objective     Sagrario received therapeutic exercises to develop strength and flexibility for 15 minutes including:    - HS stretch at step 3x30 sec B   - quad set (without HS stretch) 30x  - supine bridging 3x10   - supine SLR + QS 3x10 (B)  - SL clamshells 3x10 (B)  - recumbent bike for increased ROM/circulation 8 min timed.     - long-sitting HS stretch + quad set 5" hold 30x (B)  - SL clamshells 3x10 (B)  - supine bridging 3x10       Sagrario received the following manual therapy techniques: Joint mobilizations and Soft tissue Mobilization were applied to the: L HS for 15 minutes, including:  - IASTM/STM/sustained pressure: sup/mod tx-depth (L) biceps femoris.       Sagrario received cold pack for 10 minutes to L knee.      Home Exercises Provided and Patient Education Provided     Education provided:   - Review of POC  - exercise rationale.     Written Home Exercises Provided: Patient instructed to cont prior HEP.  Exercises were reviewed and Sagrario was able to " demonstrate them prior to the end of the session.  Sagrario demonstrated good  understanding of the education provided.     See EMR under Media for exercises provided prior visit.    Assessment     Continued with IASTM and STM/muscle play at start of session for decreased HS muscle tone on LLE with moderate tx-depth tolerated. Slight increase in exercise volume performed today with inclusion of SL clamshells good tolerance noted. Patient reported feeling well upon departure.     Sagrario is a 68 y.o. female referred to outpatient Physical Therapy with a medical diagnosis of M17.0 (ICD-10-CM) - Primary osteoarthritis of knees, bilateral. Pt presents with gait abnormalities, ROM restrictions, significant strength deficits of hip and knee musculature, and flexibility loss contributing to decreased functional mobility tolerance and increased pain.   Sagrario is progressing well towards her goals.   Pt prognosis is Good.     Pt will continue to benefit from skilled outpatient physical therapy to address the deficits listed in the problem list box on initial evaluation, provide pt/family education and to maximize pt's level of independence in the home and community environment.     Pt's spiritual, cultural and educational needs considered and pt agreeable to plan of care and goals.    Anticipated barriers to physical therapy: None    Goals:  Short Term Goals: 2 weeks   - Pt will demonstrate excellent knowledge and adherence to HEP to facilitate optimal recovery.     Long Term Goals: 6 weeks   - Pt will demonstrate hip muscle MMT of 4+/5 or greater to increased stability needed for functional activity.   - Pt will demonstrate knee muscle MMT of 5/5 or greater to increased stability needed for functional activity.   - Pt will demonstrate HS 90/90 on LLE of 20 deg lacking or less for increased mobility tolerance.   - Pt will report FOTO score of 50% limited or less indicating clinically relevant improvement in functional  tolerance.     Plan     Progress exercise as tolerated.     Estela Moreno, PT, DPT

## 2019-02-12 ENCOUNTER — CLINICAL SUPPORT (OUTPATIENT)
Dept: REHABILITATION | Facility: HOSPITAL | Age: 69
End: 2019-02-12
Attending: FAMILY MEDICINE
Payer: MEDICARE

## 2019-02-12 DIAGNOSIS — M25.60 RANGE OF MOTION DEFICIT: ICD-10-CM

## 2019-02-12 DIAGNOSIS — M25.561 BILATERAL CHRONIC KNEE PAIN: ICD-10-CM

## 2019-02-12 DIAGNOSIS — R53.1 WEAKNESS: ICD-10-CM

## 2019-02-12 DIAGNOSIS — G89.29 BILATERAL CHRONIC KNEE PAIN: ICD-10-CM

## 2019-02-12 DIAGNOSIS — R26.2 DIFFICULTY WALKING: ICD-10-CM

## 2019-02-12 DIAGNOSIS — M25.562 BILATERAL CHRONIC KNEE PAIN: ICD-10-CM

## 2019-02-12 PROCEDURE — 97110 THERAPEUTIC EXERCISES: CPT

## 2019-02-12 PROCEDURE — 97140 MANUAL THERAPY 1/> REGIONS: CPT

## 2019-02-12 NOTE — PROGRESS NOTES
"  Physical Therapy Daily Treatment Note     Name: Sagrario Freire  Clinic Number: [id number]    Therapy Diagnosis:   Encounter Diagnoses   Name Primary?    Bilateral chronic knee pain     Difficulty walking     Weakness     Range of motion deficit      Physician: Kendrick Freire, *    Visit Date: 2/12/2019    Physician Orders: PT Eval and Treat   Medical Diagnosis from Referral: M17.0 (ICD-10-CM) - Primary osteoarthritis of knees, bilateral  Evaluation Date: 1/15/2019  Authorization Period Expiration: 1/3/2020  Plan of Care Expiration: 2/26/2019  Visit # / Visits authorized: 7/ 1 (pending)      Time In: 10:00  Time Out: 10:53  Total Billable Time: 53 minutes     Precautions: Diabetes    Subjective     Pt reports: She has been continuing to feel better, although her L knee has ached some the last few days. She has still not had any 'thaddeus horses' in her HS.   She was compliant with home exercise program.  Response to previous treatment: Tolerated well  Functional change: decreased pain, post-exercise soreness.     Pain: NT  Location: left knee      Objective     Sagrario received therapeutic exercises to develop strength and flexibility for 45 minutes including:    - HS stretch (long-sitting) 3x30 sec B   - quad set (without HS stretch) 30x  - supine bridging 3x10   - supine SLR + QS 3x10 (B)  - SL clamshells 3x10 (B)  - recumbent bike for increased ROM/circulation 8 min timed.     - long-sitting HS stretch + quad set 5" hold 30x (B)  - SL clamshells 3x10 (B)  - supine bridging 3x10       Sagrario received the following manual therapy techniques: Joint mobilizations and Soft tissue Mobilization were applied to the: L HS for 8 minutes, including:  - STM/sustained pressure: sup/mod tx-depth (L) biceps femoris and vastus lateralis      Sagrario received cold pack for 10 minutes to L knee.      Home Exercises Provided and Patient Education Provided     Education provided:   - Review of POC  - exercise rationale. "     Written Home Exercises Provided: Patient instructed to cont prior HEP.  Exercises were reviewed and Sagrario was able to demonstrate them prior to the end of the session.  Sagrario demonstrated good  understanding of the education provided.     See EMR under Media for exercises provided prior visit.    Assessment     Continued with STM/muscle play at start of session for decreased HS and VL muscle tone on LLE with moderate tx-depth tolerated; noted release of vastus lateralis with sustained pressure. Modified HS stretch from step to long-sitting to decrease knee discomfort. Patient reported feeling well upon departure.     Sagrario is a 68 y.o. female referred to outpatient Physical Therapy with a medical diagnosis of M17.0 (ICD-10-CM) - Primary osteoarthritis of knees, bilateral. Pt presents with gait abnormalities, ROM restrictions, significant strength deficits of hip and knee musculature, and flexibility loss contributing to decreased functional mobility tolerance and increased pain.   Sagrario is progressing well towards her goals.   Pt prognosis is Good.     Pt will continue to benefit from skilled outpatient physical therapy to address the deficits listed in the problem list box on initial evaluation, provide pt/family education and to maximize pt's level of independence in the home and community environment.     Pt's spiritual, cultural and educational needs considered and pt agreeable to plan of care and goals.    Anticipated barriers to physical therapy: None    Goals:  Short Term Goals: 2 weeks   - Pt will demonstrate excellent knowledge and adherence to HEP to facilitate optimal recovery. (achieved)     Long Term Goals: 6 weeks   - Pt will demonstrate hip muscle MMT of 4+/5 or greater to increased stability needed for functional activity.   - Pt will demonstrate knee muscle MMT of 5/5 or greater to increased stability needed for functional activity.   - Pt will demonstrate HS 90/90 on LLE of 20 deg  lacking or less for increased mobility tolerance.   - Pt will report FOTO score of 50% limited or less indicating clinically relevant improvement in functional tolerance.     Plan     Progress exercise as tolerated.     Estela Moreno, PT, DPT

## 2019-04-22 ENCOUNTER — TELEPHONE (OUTPATIENT)
Dept: SPINE | Facility: CLINIC | Age: 69
End: 2019-04-22

## 2019-04-22 ENCOUNTER — TELEPHONE (OUTPATIENT)
Dept: SPORTS MEDICINE | Facility: CLINIC | Age: 69
End: 2019-04-22

## 2019-04-22 ENCOUNTER — OFFICE VISIT (OUTPATIENT)
Dept: SPORTS MEDICINE | Facility: CLINIC | Age: 69
End: 2019-04-22
Payer: MEDICARE

## 2019-04-22 VITALS — WEIGHT: 155 LBS | TEMPERATURE: 98 F | BODY MASS INDEX: 24.91 KG/M2 | HEIGHT: 66 IN

## 2019-04-22 DIAGNOSIS — M54.5 CHRONIC LOW BACK PAIN, UNSPECIFIED BACK PAIN LATERALITY, WITH SCIATICA PRESENCE UNSPECIFIED: Primary | ICD-10-CM

## 2019-04-22 DIAGNOSIS — M17.12 PRIMARY OSTEOARTHRITIS OF LEFT KNEE: Primary | ICD-10-CM

## 2019-04-22 DIAGNOSIS — G89.29 CHRONIC LOW BACK PAIN, UNSPECIFIED BACK PAIN LATERALITY, WITH SCIATICA PRESENCE UNSPECIFIED: Primary | ICD-10-CM

## 2019-04-22 PROCEDURE — 1101F PR PT FALLS ASSESS DOC 0-1 FALLS W/OUT INJ PAST YR: ICD-10-PCS | Mod: CPTII,S$GLB,, | Performed by: FAMILY MEDICINE

## 2019-04-22 PROCEDURE — 99214 OFFICE O/P EST MOD 30 MIN: CPT | Mod: 25,S$GLB,, | Performed by: FAMILY MEDICINE

## 2019-04-22 PROCEDURE — 20611 LARGE JOINT ASPIRATION/INJECTION: L KNEE: ICD-10-PCS | Mod: LT,S$GLB,, | Performed by: FAMILY MEDICINE

## 2019-04-22 PROCEDURE — 20611 DRAIN/INJ JOINT/BURSA W/US: CPT | Mod: LT,S$GLB,, | Performed by: FAMILY MEDICINE

## 2019-04-22 PROCEDURE — 99214 PR OFFICE/OUTPT VISIT, EST, LEVL IV, 30-39 MIN: ICD-10-PCS | Mod: 25,S$GLB,, | Performed by: FAMILY MEDICINE

## 2019-04-22 PROCEDURE — 99999 PR PBB SHADOW E&M-EST. PATIENT-LVL III: ICD-10-PCS | Mod: PBBFAC,,, | Performed by: FAMILY MEDICINE

## 2019-04-22 PROCEDURE — 99999 PR PBB SHADOW E&M-EST. PATIENT-LVL III: CPT | Mod: PBBFAC,,, | Performed by: FAMILY MEDICINE

## 2019-04-22 PROCEDURE — 1101F PT FALLS ASSESS-DOCD LE1/YR: CPT | Mod: CPTII,S$GLB,, | Performed by: FAMILY MEDICINE

## 2019-04-22 RX ORDER — TRIAMCINOLONE ACETONIDE 40 MG/ML
40 INJECTION, SUSPENSION INTRA-ARTICULAR; INTRAMUSCULAR
Status: DISCONTINUED | OUTPATIENT
Start: 2019-04-22 | End: 2019-04-22 | Stop reason: HOSPADM

## 2019-04-22 RX ADMIN — TRIAMCINOLONE ACETONIDE 40 MG: 40 INJECTION, SUSPENSION INTRA-ARTICULAR; INTRAMUSCULAR at 01:04

## 2019-04-22 NOTE — TELEPHONE ENCOUNTER
----- Message from Callie Multani RN sent at 4/22/2019  3:59 PM CDT -----  Regarding: FW: Appointment  Please offer her tomorrow or Wed with Srtacy  ----- Message -----  From: Kev Aguilar MA  Sent: 4/22/2019   3:52 PM  To: [facility] Clinical Support  Subject: Appointment                                      We saw this patient today and would like her to have an appointment with back and spine to be evaluated.    Can you please call the patient and schedule?    Thank you,    Kev Aguilar   Sports Medicine Assistant   [facility]

## 2019-04-22 NOTE — TELEPHONE ENCOUNTER
----- Message from Callie Multani RN sent at 4/22/2019  3:59 PM CDT -----  Regarding: RE: Appointment  The schedule is open. There is a lot of access tomorrow and Wed.  ----- Message -----  From: Kev Aguilar MA  Sent: 4/22/2019   3:52 PM  To: [facility] Clinical Support  Subject: Appointment                                      We saw this patient today and would like her to have an appointment with back and spine to be evaluated.    Can you please call the patient and schedule?    Thank you,    Kev Aguilar   Sports Medicine Assistant   [facility] [place]

## 2019-04-22 NOTE — PROGRESS NOTES
Sagrario Freire, a 68 y.o. female, presents today for evaluation of her LEFT and RIGHT knee.      History of Present Illness (HPI)  Location: medial/posterior knee, L>R  Onset: Chronic, >2 years  Palliative:    Relative rest   Oral analgesics   CSI, non [facility] about 2 years ago   CSI, L/R [place], 18.11.07, 90% improvement   fPT, @[facility] - 19.01.15 - 19.02.12 , minimal Improvement    Provocative: Resolved   ADLS   Prolonged ambulation  Prior: none  Progression: Resolving discomfort  Quality:    Resolved    sharp pain   Muscle spasms at night  Radiation: none  Severity: per nursing documentation  Timing: intermittent w/ use  Trauma: none    Review of Systems (ROS)  A 10+ review of systems was performed with pertinent positives and negatives noted above in the history of present illness. Other systems were negative unless otherwise specified.    Physical Examination (PE)  General:  The patient is alert and oriented x 3. Mood is pleasant. Observation of ears, eyes and nose reveal no gross abnormalities. HEENT: NCAT, sclera anicteric.   Lungs: Respirations are equal and unlabored.  Gait is coordinated. Patient can toe walk and heel walk without difficulty.    KIEL KNEE EXAMINATION    Observation/Inspection  Gait:   Nonantalgic   Alignment:  Neutral   Scars:   None   Muscle atrophy: Mild  Effusion:  None   Warmth:  None   Discoloration:   none     Tenderness / Crepitus (T / C):         T / C      T / C  Patella   - / -   Lateral joint line   - / -     Peripatellar medial  -  Medial joint line    + / - resolved  Peripatellar lateral -  Medial plica   - / -  Patellar tendon -   Popliteal fossa   + / - Resolved  Quad tendon   -   Gastrocnemius   -  Prepatellar Bursa - / -   Quadricep   -  Tibial tubercle  -  Thigh/hamstring  -  Pes anserine/HS -  Fibula    -  ITB   - / -  Tibia     -  Tib/fib joint  - / -  LCL    -    MFC   - / -   MCL: Proximal  -    LFC   - / -   Distal    -          ROM: (* = pain)  PASSIVE   ACTIVE     Left :   5 / 0 / 145   5 / 0 / 145     Right :    5 / 0 / 145   5 / 0 / 145    Patellofemoral examination:  See above noted areas of tenderness.   Patella position    Subluxation / dislocation: Centered        Sup. / Inf;   Normal   Crepitus (PF):    Absent   Patellar Mobility:       Medial-lateral:   Normal    Superior-inferior:  Normal    Inferior tilt   Normal    Patellar tendon:  Normal   Lateral tilt:    Normal   J-sign:     None   Patellofemoral grind:   No pain     Meniscal Signs:   RESOLVED  Pain on terminal extension:  +   Pain on terminal flexion:  +  Andressas maneuver:  +*  Squat     NT    Ligament Examination:  ACL / Lachman:  WNL  PCL-Post.  drawer: normal 0 to 2mm  MCL- Valgus:  normal 0 to 2mm  LCL- Varus:    normal 0 to 2mm  Pivot shift:  guarding   Dial Test:   difference c/w other side   At 30° flexion: normal (< 5°)    At 90° flexion: normal (< 5°)   Reverse Pivot Shift:   normal (Equal)     Strength: (* = with pain) Painful Side  Quadriceps   5/5  Hamstrin/5    Extremity Neuro-vascular Examination:   Sensation:  Grossly intact to light touch all dermatomal regions.   Motor Function:  Fully intact motor function at hip, knee, foot and ankle    DTRs;  quadriceps and  achilles 2+.  No clonus and downgoing Babinski.    Vascular status:  DP and PT pulses 2+, brisk capillary refill, symmetric.     Other Findings:    ASSESSMENT & PLAN  Assessment:   #1 Kellgren-Jeyson Grade III osteoarthritis of knee, edward med compartment, bilat  #2 prior calf pain, left   US negative on .07    No evidence of neurologic pathology    Imaging studies reviewed:   X-ray knee, bilateral     Plan:    We discussed the importance of appropriate diet, weight, and regular exercise including quadriceps strengthening     We discussed options including:  #1 watchful waiting  #2 re start physical therapy aimed at:   Core stability   RoM knee   Strengthening quadriceps   Gait training   #3 injection  therapy:   CSI i[place]nee     Right, effective good%, repeat    Left, effective good%, repeat   VSI [place]    Right,     Left,    Orthobiologics   #4 consultation      The patient chooses #3 csi iaknee left    Pain management: handout given  Bracing:   Physical therapy: fPTERELL, @ [facility], prior as above, modest experience this past time  Activity (e.g. 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

## 2019-04-22 NOTE — PROCEDURES
"Large Joint Aspiration/Injection: L knee  Date/Time: 4/22/2019 1:52 PM  Performed by: Kendrick Freire MD  Authorized by: Kendrick Freire MD     Consent Done?:  Yes (Verbal)  Indications:  Pain  Procedure site marked: Yes    Timeout: Prior to procedure the correct patient, procedure, and site was verified      Location:  Knee  Site:  L knee  Prep: Patient was prepped and draped in usual sterile fashion    Ultrasonic Guidance for needle placement: Yes  Images are saved and documented.  Needle size:  20 G  Approach:  Lateral  Medications:  40 mg triamcinolone acetonide 40 mg/mL  Patient tolerance:  Patient tolerated the procedure well with no immediate complications    Additional Comments: Description of ultrasound utilization for needle guidance:   Ultrasound guidance used for needle localization. Images saved and stored for documentation. The knee joint was visualized. Dynamic visualization of the 20g x 3.5" needle was continuous throughout the procedure.      "

## 2019-04-24 ENCOUNTER — OFFICE VISIT (OUTPATIENT)
Dept: SPINE | Facility: CLINIC | Age: 69
End: 2019-04-24
Payer: MEDICARE

## 2019-04-24 ENCOUNTER — HOSPITAL ENCOUNTER (OUTPATIENT)
Dept: RADIOLOGY | Facility: OTHER | Age: 69
Discharge: HOME | End: 2019-04-24
Attending: PHYSICIAN ASSISTANT
Payer: MEDICARE

## 2019-04-24 ENCOUNTER — TELEPHONE (OUTPATIENT)
Dept: SPINE | Facility: CLINIC | Age: 69
End: 2019-04-24

## 2019-04-24 VITALS — SYSTOLIC BLOOD PRESSURE: 118 MMHG | HEART RATE: 90 BPM | DIASTOLIC BLOOD PRESSURE: 91 MMHG

## 2019-04-24 DIAGNOSIS — G89.29 CHRONIC BILATERAL LOW BACK PAIN WITH LEFT-SIDED SCIATICA: Primary | ICD-10-CM

## 2019-04-24 DIAGNOSIS — M54.6 CHRONIC BILATERAL THORACIC BACK PAIN: ICD-10-CM

## 2019-04-24 DIAGNOSIS — M54.42 CHRONIC BILATERAL LOW BACK PAIN WITH LEFT-SIDED SCIATICA: Primary | ICD-10-CM

## 2019-04-24 DIAGNOSIS — M54.5 LOW BACK PAIN, UNSPECIFIED BACK PAIN LATERALITY, UNSPECIFIED CHRONICITY, WITH SCIATICA PRESENCE UNSPECIFIED: Primary | ICD-10-CM

## 2019-04-24 DIAGNOSIS — M54.14 THORACIC AND LUMBOSACRAL NEURITIS: ICD-10-CM

## 2019-04-24 DIAGNOSIS — Z87.81 HISTORY OF COMPRESSION FRACTURE OF SPINE: ICD-10-CM

## 2019-04-24 DIAGNOSIS — M54.17 THORACIC AND LUMBOSACRAL NEURITIS: ICD-10-CM

## 2019-04-24 DIAGNOSIS — M51.37 DDD (DEGENERATIVE DISC DISEASE), LUMBOSACRAL: ICD-10-CM

## 2019-04-24 DIAGNOSIS — G89.29 CHRONIC BILATERAL THORACIC BACK PAIN: ICD-10-CM

## 2019-04-24 DIAGNOSIS — M54.5 LOW BACK PAIN, UNSPECIFIED BACK PAIN LATERALITY, UNSPECIFIED CHRONICITY, WITH SCIATICA PRESENCE UNSPECIFIED: ICD-10-CM

## 2019-04-24 DIAGNOSIS — M43.10 ACQUIRED SPONDYLOLISTHESIS: ICD-10-CM

## 2019-04-24 PROCEDURE — 72082 X-RAY EXAM ENTIRE SPI 2/3 VW: CPT | Mod: 26,,, | Performed by: RADIOLOGY

## 2019-04-24 PROCEDURE — 99999 PR PBB SHADOW E&M-EST. PATIENT-LVL III: ICD-10-PCS | Mod: PBBFAC,,, | Performed by: PHYSICIAN ASSISTANT

## 2019-04-24 PROCEDURE — 1101F PR PT FALLS ASSESS DOC 0-1 FALLS W/OUT INJ PAST YR: ICD-10-PCS | Mod: CPTII,S$GLB,, | Performed by: PHYSICIAN ASSISTANT

## 2019-04-24 PROCEDURE — 99203 OFFICE O/P NEW LOW 30 MIN: CPT | Mod: S$GLB,,, | Performed by: PHYSICIAN ASSISTANT

## 2019-04-24 PROCEDURE — 99203 PR OFFICE/OUTPT VISIT, NEW, LEVL III, 30-44 MIN: ICD-10-PCS | Mod: S$GLB,,, | Performed by: PHYSICIAN ASSISTANT

## 2019-04-24 PROCEDURE — 72082 X-RAY EXAM ENTIRE SPI 2/3 VW: CPT | Mod: TC,FY

## 2019-04-24 PROCEDURE — 1101F PT FALLS ASSESS-DOCD LE1/YR: CPT | Mod: CPTII,S$GLB,, | Performed by: PHYSICIAN ASSISTANT

## 2019-04-24 PROCEDURE — 99999 PR PBB SHADOW E&M-EST. PATIENT-LVL III: CPT | Mod: PBBFAC,,, | Performed by: PHYSICIAN ASSISTANT

## 2019-04-24 PROCEDURE — 72082 XR SCOLIOSIS COMPLETE: ICD-10-PCS | Mod: 26,,, | Performed by: RADIOLOGY

## 2019-04-24 RX ORDER — ZOLPIDEM TARTRATE 10 MG/1
10 TABLET ORAL DAILY
Refills: 0 | COMMUNITY
Start: 2019-04-09

## 2019-04-24 RX ORDER — ALPRAZOLAM 0.5 MG/1
0.5 TABLET ORAL EVERY 12 HOURS
Refills: 0 | COMMUNITY
Start: 2019-02-14

## 2019-04-24 NOTE — PATIENT INSTRUCTIONS
It was a pleasure to meet you today!    I think your mid back pain is more muscular in nature. You have old compression fracture in your lower back at L2- I don't think this is causing any of your pain. You have mild degeneration in the lower back as well that is likely cause of your pain. I think you are right and that when you are limping on your left knee that this is aggravating your back.     Try the exercise booklet and avoid anything at the gym that aggravates your pain. If you decide you want to do formal physical therapy, then let me know.     If things get worse, we can consider an MRI and possible injections with pain management.     Email me if you have any further questions/concerns.     Priya
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
## 2019-07-15 ENCOUNTER — OFFICE VISIT (OUTPATIENT)
Dept: SPORTS MEDICINE | Facility: CLINIC | Age: 69
End: 2019-07-15
Payer: MEDICARE

## 2019-07-15 VITALS — HEIGHT: 66 IN | BODY MASS INDEX: 24.91 KG/M2 | WEIGHT: 155 LBS | TEMPERATURE: 98 F

## 2019-07-15 DIAGNOSIS — M17.12 PRIMARY OSTEOARTHRITIS OF LEFT KNEE: Primary | ICD-10-CM

## 2019-07-15 PROCEDURE — 99214 PR OFFICE/OUTPT VISIT, EST, LEVL IV, 30-39 MIN: ICD-10-PCS | Mod: 25,S$GLB,, | Performed by: FAMILY MEDICINE

## 2019-07-15 PROCEDURE — 99999 PR PBB SHADOW E&M-EST. PATIENT-LVL III: ICD-10-PCS | Mod: PBBFAC,,, | Performed by: FAMILY MEDICINE

## 2019-07-15 PROCEDURE — 1101F PT FALLS ASSESS-DOCD LE1/YR: CPT | Mod: CPTII,S$GLB,, | Performed by: FAMILY MEDICINE

## 2019-07-15 PROCEDURE — 99214 OFFICE O/P EST MOD 30 MIN: CPT | Mod: 25,S$GLB,, | Performed by: FAMILY MEDICINE

## 2019-07-15 PROCEDURE — 99999 PR PBB SHADOW E&M-EST. PATIENT-LVL III: CPT | Mod: PBBFAC,,, | Performed by: FAMILY MEDICINE

## 2019-07-15 PROCEDURE — 20611 LARGE JOINT ASPIRATION/INJECTION: L KNEE: ICD-10-PCS | Mod: LT,S$GLB,, | Performed by: FAMILY MEDICINE

## 2019-07-15 PROCEDURE — 1101F PR PT FALLS ASSESS DOC 0-1 FALLS W/OUT INJ PAST YR: ICD-10-PCS | Mod: CPTII,S$GLB,, | Performed by: FAMILY MEDICINE

## 2019-07-15 PROCEDURE — 20611 DRAIN/INJ JOINT/BURSA W/US: CPT | Mod: LT,S$GLB,, | Performed by: FAMILY MEDICINE

## 2019-07-15 RX ORDER — TRIAMCINOLONE ACETONIDE 40 MG/ML
40 INJECTION, SUSPENSION INTRA-ARTICULAR; INTRAMUSCULAR
Status: DISCONTINUED | OUTPATIENT
Start: 2019-07-15 | End: 2019-07-15 | Stop reason: HOSPADM

## 2019-07-15 RX ADMIN — TRIAMCINOLONE ACETONIDE 40 MG: 40 INJECTION, SUSPENSION INTRA-ARTICULAR; INTRAMUSCULAR at 11:07

## 2019-07-15 NOTE — PROCEDURES
"Large Joint Aspiration/Injection: L knee  Date/Time: 7/15/2019 11:21 AM  Performed by: Kendrick Freire MD  Authorized by: Kendrick Freire MD     Consent Done?:  Yes (Verbal)  Indications:  Pain  Procedure site marked: Yes    Timeout: Prior to procedure the correct patient, procedure, and site was verified      Location:  Knee  Site:  L knee  Prep: Patient was prepped and draped in usual sterile fashion    Ultrasonic Guidance for needle placement: Yes  Images are saved and documented.  Needle size:  20 G  Approach:  Lateral  Medications:  40 mg triamcinolone acetonide 40 mg/mL  Patient tolerance:  Patient tolerated the procedure well with no immediate complications    Additional Comments: Description of ultrasound utilization for needle guidance:   Ultrasound guidance used for needle localization. Images saved and stored for documentation. The knee joint was visualized. Dynamic visualization of the 20g x 3.5" needle was continuous throughout the procedure.      "

## 2019-07-15 NOTE — PROGRESS NOTES
Sagrario Freire, a 69 y.o. female, presents today for evaluation of her LEFT and RIGHT knee.      History of Present Illness (HPI)  Location: medial/posterior knee, L>R  Onset: Chronic, >2 years  Palliative:    Relative rest   Oral analgesics   CSI, non OCH about 2 years ago   CSI, L/R Odalis, 18.11.07, 90% improvement   fPT, @[facility] - 19.01.15 - 19.02.12 , minimal Improvement   GHADA ALONZO, 04.22.2019, 60% Improvement for 4 weeks  Provocative: Resolved   ADLS   Prolonged ambulation  Prior: none  Progression: Resolving discomfort  Quality:    Resolved    sharp pain   Muscle spasms at night  Radiation: none  Severity: per nursing documentation  Timing: intermittent w/ use  Trauma: none    Review of Systems (ROS)  A 10+ review of systems was performed with pertinent positives and negatives noted above in the history of present illness. Other systems were negative unless otherwise specified.    Physical Examination (PE)  General:  The patient is alert and oriented x 3. Mood is pleasant. Observation of ears, eyes and nose reveal no gross abnormalities. HEENT: NCAT, sclera anicteric.   Lungs: Respirations are equal and unlabored.  Gait is coordinated. Patient can toe walk and heel walk without difficulty.    KIEL KNEE EXAMINATION    Observation/Inspection  Gait:   Nonantalgic   Alignment:  Neutral   Scars:   None   Muscle atrophy: Mild  Effusion:  None   Warmth:  None   Discoloration:   none     Tenderness / Crepitus (T / C):         T / C      T / C  Patella   - / -   Lateral joint line   - / -     Peripatellar medial  -  Medial joint line    + / - resolved  Peripatellar lateral -  Medial plica   - / -  Patellar tendon -   Popliteal fossa   + / - Resolved  Quad tendon   -   Gastrocnemius   -  Prepatellar Bursa - / -   Quadricep   -  Tibial tubercle  -  Thigh/hamstring  -  Pes anserine/HS -  Fibula    -  ITB   - / -  Tibia     -  Tib/fib joint  - / -  LCL    -    MFC   - / -   MCL: Proximal  -    LFC   - / -    Distal    -          ROM: (* = pain)  PASSIVE   ACTIVE    Left :   5 / 0 / 145   5 / 0 / 145     Right :    5 / 0 / 145   5 / 0 / 145    Patellofemoral examination:  See above noted areas of tenderness.   Patella position    Subluxation / dislocation: Centered        Sup. / Inf;   Normal   Crepitus (PF):    Absent   Patellar Mobility:       Medial-lateral:   Normal    Superior-inferior:  Normal    Inferior tilt   Normal    Patellar tendon:  Normal   Lateral tilt:    Normal   J-sign:     None   Patellofemoral grind:   No pain     Meniscal Signs:   RESOLVED  Pain on terminal extension:  +   Pain on terminal flexion:  +  Andressas maneuver:  +*  Squat     NT    Ligament Examination:  ACL / Lachman:  WNL  PCL-Post.  drawer: normal 0 to 2mm  MCL- Valgus:  normal 0 to 2mm  LCL- Varus:    normal 0 to 2mm  Pivot shift:  guarding   Dial Test:   difference c/w other side   At 30° flexion: normal (< 5°)    At 90° flexion: normal (< 5°)   Reverse Pivot Shift:   normal (Equal)     Strength: (* = with pain) Painful Side  Quadriceps   5/5  Hamstrin/5    Extremity Neuro-vascular Examination:   Sensation:  Grossly intact to light touch all dermatomal regions.   Motor Function:  Fully intact motor function at hip, knee, foot and ankle    DTRs;  quadriceps and  achilles 2+.  No clonus and downgoing Babinski.    Vascular status:  DP and PT pulses 2+, brisk capillary refill, symmetric.     Other Findings:    ASSESSMENT & PLAN  Assessment:   #1 Kellgren-Jeyson Grade III osteoarthritis of knee, edward med compartment, bilat  W/ knee pain, left >>> right    No evidence of neurologic pathology    Imaging studies reviewed:   X-ray knee, bilateral 18.11    Plan:    We discussed the importance of appropriate diet, weight, and regular exercise including quadriceps strengthening     We discussed options including:  #1 watchful waiting  #2 re start physical therapy aimed at:   Core stability   RoM knee   Strengthening  quadriceps   Gait training   #3 injection therapy:   CSI iaknee     Right, effective good%, repeat    Left, effective good%, repeat   VSI iaknee    Right,     Left,    Orthobiologics   #4 consultation      The patient chooses #3 csi iaknee left and #3 vsi iaknee left    Pain management: handout given  Bracing:   Physical therapy: fPT, @ [facility], prior as above, modest experience this past time  Activity (e.g. 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

## 2019-07-24 ENCOUNTER — TELEPHONE (OUTPATIENT)
Dept: SPORTS MEDICINE | Facility: CLINIC | Age: 69
End: 2019-07-24

## 2019-07-24 NOTE — TELEPHONE ENCOUNTER
r/s patient    Kev Aguilar   Sports Medicine Assistant   [facility]       ----- Message from Kev Aguilar MA sent at 7/23/2019  5:24 PM CDT -----  Contact: [email or web address]      ----- Message -----  From: Radha Thompson  Sent: 7/23/2019   1:32 PM  To: Tani BLANCHARD Staff    Needs Advice    Reason for call:patient would like to change appointment from 8/5/19 to 8/2/19 please call patient.        Communication Preference:    Additional Information:

## 2019-08-05 ENCOUNTER — OFFICE VISIT (OUTPATIENT)
Dept: SPORTS MEDICINE | Facility: CLINIC | Age: 69
End: 2019-08-05
Payer: MEDICARE

## 2019-08-05 VITALS — TEMPERATURE: 99 F | WEIGHT: 155 LBS | BODY MASS INDEX: 24.91 KG/M2 | HEIGHT: 66 IN

## 2019-08-05 DIAGNOSIS — M17.12 PRIMARY OSTEOARTHRITIS OF LEFT KNEE: Primary | ICD-10-CM

## 2019-08-05 PROCEDURE — 99999 PR PBB SHADOW E&M-EST. PATIENT-LVL III: ICD-10-PCS | Mod: PBBFAC,,, | Performed by: FAMILY MEDICINE

## 2019-08-05 PROCEDURE — 99999 PR PBB SHADOW E&M-EST. PATIENT-LVL III: CPT | Mod: PBBFAC,,, | Performed by: FAMILY MEDICINE

## 2019-08-05 PROCEDURE — 20611 LARGE JOINT ASPIRATION/INJECTION: L KNEE: ICD-10-PCS | Mod: LT,S$GLB,, | Performed by: FAMILY MEDICINE

## 2019-08-05 PROCEDURE — 99499 UNLISTED E&M SERVICE: CPT | Mod: S$GLB,,, | Performed by: FAMILY MEDICINE

## 2019-08-05 PROCEDURE — 99499 NO LOS: ICD-10-PCS | Mod: S$GLB,,, | Performed by: FAMILY MEDICINE

## 2019-08-05 PROCEDURE — 20611 DRAIN/INJ JOINT/BURSA W/US: CPT | Mod: LT,S$GLB,, | Performed by: FAMILY MEDICINE

## 2019-08-13 ENCOUNTER — OFFICE VISIT (OUTPATIENT)
Dept: SPORTS MEDICINE | Facility: CLINIC | Age: 69
End: 2019-08-13
Payer: MEDICARE

## 2019-08-13 VITALS — WEIGHT: 155 LBS | TEMPERATURE: 98 F | BODY MASS INDEX: 24.91 KG/M2 | HEIGHT: 66 IN

## 2019-08-13 DIAGNOSIS — M17.12 PRIMARY OSTEOARTHRITIS OF LEFT KNEE: Primary | ICD-10-CM

## 2019-08-13 PROCEDURE — 99499 NO LOS: ICD-10-PCS | Mod: S$GLB,,, | Performed by: FAMILY MEDICINE

## 2019-08-13 PROCEDURE — 20611 DRAIN/INJ JOINT/BURSA W/US: CPT | Mod: LT,S$GLB,, | Performed by: FAMILY MEDICINE

## 2019-08-13 PROCEDURE — 20611 LARGE JOINT ASPIRATION/INJECTION: L KNEE: ICD-10-PCS | Mod: LT,S$GLB,, | Performed by: FAMILY MEDICINE

## 2019-08-13 PROCEDURE — 99499 UNLISTED E&M SERVICE: CPT | Mod: S$GLB,,, | Performed by: FAMILY MEDICINE

## 2019-08-13 PROCEDURE — 99999 PR PBB SHADOW E&M-EST. PATIENT-LVL III: CPT | Mod: PBBFAC,,, | Performed by: FAMILY MEDICINE

## 2019-08-13 PROCEDURE — 99999 PR PBB SHADOW E&M-EST. PATIENT-LVL III: ICD-10-PCS | Mod: PBBFAC,,, | Performed by: FAMILY MEDICINE

## 2019-08-13 NOTE — PROCEDURES
"Large Joint Aspiration/Injection: L knee  Date/Time: 8/13/2019 11:06 AM  Performed by: Kendrick Freire MD  Authorized by: Kendrick Freire MD     Consent Done?:  Yes (Verbal)  Indications:  Pain  Procedure site marked: Yes    Timeout: Prior to procedure the correct patient, procedure, and site was verified      Location:  Knee  Site:  L knee  Prep: Patient was prepped and draped in usual sterile fashion    Needle size:  20 G  Ultrasonic Guidance for needle placement: Yes  Images are saved and documented.  Approach:  Lateral  Medications:  20 mg sodium hyaluronate (EUFLEXXA) 10 mg/mL(mw 2.4 -3.6 million)  Patient tolerance:  Patient tolerated the procedure well with no immediate complications    Additional Comments: Description of ultrasound utilization for needle guidance:   Ultrasound guidance used for needle localization. Images saved and stored for documentation. The knee joint was visualized. Dynamic visualization of the 20g x 3.5" needle was continuous throughout the procedure.      "

## 2019-08-19 ENCOUNTER — OFFICE VISIT (OUTPATIENT)
Dept: SPORTS MEDICINE | Facility: CLINIC | Age: 69
End: 2019-08-19
Payer: MEDICARE

## 2019-08-19 VITALS — HEIGHT: 66 IN | WEIGHT: 155 LBS | BODY MASS INDEX: 24.91 KG/M2 | TEMPERATURE: 99 F

## 2019-08-19 DIAGNOSIS — M17.12 PRIMARY OSTEOARTHRITIS OF LEFT KNEE: Primary | ICD-10-CM

## 2019-08-19 PROCEDURE — 20611 DRAIN/INJ JOINT/BURSA W/US: CPT | Mod: LT,S$GLB,, | Performed by: FAMILY MEDICINE

## 2019-08-19 PROCEDURE — 99999 PR PBB SHADOW E&M-EST. PATIENT-LVL III: ICD-10-PCS | Mod: PBBFAC,,, | Performed by: FAMILY MEDICINE

## 2019-08-19 PROCEDURE — 99999 PR PBB SHADOW E&M-EST. PATIENT-LVL III: CPT | Mod: PBBFAC,,, | Performed by: FAMILY MEDICINE

## 2019-08-19 PROCEDURE — 99499 UNLISTED E&M SERVICE: CPT | Mod: S$GLB,,, | Performed by: FAMILY MEDICINE

## 2019-08-19 PROCEDURE — 20611 LARGE JOINT ASPIRATION/INJECTION: L KNEE: ICD-10-PCS | Mod: LT,S$GLB,, | Performed by: FAMILY MEDICINE

## 2019-08-19 PROCEDURE — 99499 NO LOS: ICD-10-PCS | Mod: S$GLB,,, | Performed by: FAMILY MEDICINE

## 2019-08-19 NOTE — PROCEDURES
"Large Joint Aspiration/Injection: L knee  Date/Time: 8/19/2019 11:43 AM  Performed by: Kendrick Freire MD  Authorized by: Kendrick Freire MD     Consent Done?:  Yes (Verbal)  Indications:  Pain  Procedure site marked: Yes    Timeout: Prior to procedure the correct patient, procedure, and site was verified      Location:  Knee  Site:  L knee  Prep: Patient was prepped and draped in usual sterile fashion    Needle size:  20 G  Ultrasonic Guidance for needle placement: Yes  Images are saved and documented.  Approach:  Lateral  Medications:  20 mg sodium hyaluronate (EUFLEXXA) 10 mg/mL(mw 2.4 -3.6 million)  Patient tolerance:  Patient tolerated the procedure well with no immediate complications    Additional Comments: Description of ultrasound utilization for needle guidance:   Ultrasound guidance used for needle localization. Images saved and stored for documentation. The knee joint was visualized. Dynamic visualization of the 20g x 3.5" needle was continuous throughout the procedure.      "

## 2019-11-18 ENCOUNTER — HOSPITAL ENCOUNTER (OUTPATIENT)
Dept: RADIOLOGY | Facility: HOSPITAL | Age: 69
Discharge: HOME | End: 2019-11-18
Attending: FAMILY MEDICINE
Payer: MEDICARE

## 2019-11-18 ENCOUNTER — OFFICE VISIT (OUTPATIENT)
Dept: SPORTS MEDICINE | Facility: CLINIC | Age: 69
End: 2019-11-18
Payer: MEDICARE

## 2019-11-18 VITALS — BODY MASS INDEX: 24.91 KG/M2 | WEIGHT: 155 LBS | HEIGHT: 66 IN | TEMPERATURE: 98 F

## 2019-11-18 DIAGNOSIS — M17.0 PRIMARY OSTEOARTHRITIS OF KNEES, BILATERAL: Primary | ICD-10-CM

## 2019-11-18 DIAGNOSIS — M25.562 LEFT KNEE PAIN, UNSPECIFIED CHRONICITY: ICD-10-CM

## 2019-11-18 PROCEDURE — 99214 PR OFFICE/OUTPT VISIT, EST, LEVL IV, 30-39 MIN: ICD-10-PCS | Mod: 25,S$GLB,, | Performed by: FAMILY MEDICINE

## 2019-11-18 PROCEDURE — 99214 OFFICE O/P EST MOD 30 MIN: CPT | Mod: 25,S$GLB,, | Performed by: FAMILY MEDICINE

## 2019-11-18 PROCEDURE — 1101F PT FALLS ASSESS-DOCD LE1/YR: CPT | Mod: CPTII,S$GLB,, | Performed by: FAMILY MEDICINE

## 2019-11-18 PROCEDURE — 99999 PR PBB SHADOW E&M-EST. PATIENT-LVL III: ICD-10-PCS | Mod: PBBFAC,,, | Performed by: FAMILY MEDICINE

## 2019-11-18 PROCEDURE — 73564 X-RAY EXAM KNEE 4 OR MORE: CPT | Mod: 26,50,, | Performed by: RADIOLOGY

## 2019-11-18 PROCEDURE — 20611 DRAIN/INJ JOINT/BURSA W/US: CPT | Mod: 50,S$GLB,, | Performed by: FAMILY MEDICINE

## 2019-11-18 PROCEDURE — 73564 XR KNEE ORTHO BILAT WITH FLEXION: ICD-10-PCS | Mod: 26,50,, | Performed by: RADIOLOGY

## 2019-11-18 PROCEDURE — 1101F PR PT FALLS ASSESS DOC 0-1 FALLS W/OUT INJ PAST YR: ICD-10-PCS | Mod: CPTII,S$GLB,, | Performed by: FAMILY MEDICINE

## 2019-11-18 PROCEDURE — 20611 LARGE JOINT ASPIRATION/INJECTION: R KNEE, L KNEE: ICD-10-PCS | Mod: 50,S$GLB,, | Performed by: FAMILY MEDICINE

## 2019-11-18 PROCEDURE — 99999 PR PBB SHADOW E&M-EST. PATIENT-LVL III: CPT | Mod: PBBFAC,,, | Performed by: FAMILY MEDICINE

## 2019-11-18 PROCEDURE — 73564 X-RAY EXAM KNEE 4 OR MORE: CPT | Mod: TC,50

## 2019-11-18 RX ORDER — TRIAMCINOLONE ACETONIDE 40 MG/ML
40 INJECTION, SUSPENSION INTRA-ARTICULAR; INTRAMUSCULAR
Status: DISCONTINUED | OUTPATIENT
Start: 2019-11-18 | End: 2019-11-18 | Stop reason: HOSPADM

## 2019-11-18 RX ADMIN — TRIAMCINOLONE ACETONIDE 40 MG: 40 INJECTION, SUSPENSION INTRA-ARTICULAR; INTRAMUSCULAR at 10:11

## 2019-11-18 NOTE — PROCEDURES
"Large Joint Aspiration/Injection: R knee, L knee  Date/Time: 11/18/2019 10:30 AM  Performed by: Kendrick Freire MD  Authorized by: Kendrick Freire MD     Consent Done?:  Yes (Verbal)  Indications:  Pain  Procedure site marked: Yes    Timeout: Prior to procedure the correct patient, procedure, and site was verified      Location:  Knee  Site:  R knee and L knee  Prep: Patient was prepped and draped in usual sterile fashion    Needle size:  20 G  Ultrasonic Guidance for needle placement: Yes  Images are saved and documented.  Approach:  Lateral  Medications:  40 mg triamcinolone acetonide 40 mg/mL; 40 mg triamcinolone acetonide 40 mg/mL  Patient tolerance:  Patient tolerated the procedure well with no immediate complications    Additional Comments: Description of ultrasound utilization for needle guidance:   Ultrasound guidance used for needle localization. Images saved and stored for documentation. The knee joint was visualized. Dynamic visualization of the 20g x 3.5" needle was continuous throughout the procedure.      "

## 2019-11-18 NOTE — PROGRESS NOTES
Sagrario Freire, a 69 y.o. female, presents today for evaluation of her LEFT and RIGHT knee.      History of Present Illness (HPI)  Location: medial/posterior knee, L>R  Onset: Chronic, >2 years  Palliative:    Relative rest   Oral analgesics   CSI, non OCH about 2 years ago   CSI, L/R Odalis, 18.11.07, 90% improvement   fPT, @[facility] - 19.01.15 - 19.02.12 , minimal Improvement   CSIGHADA, 04.22.2019, 60% Improvement for 4 weeks   CSI, L. iaKnee, 07.15.2019   VSI (Euflexxa), L. iaKnee, 08.19.2019, 25% Improvement  Provocative: Resolved   ADLS   Prolonged ambulation  Prior: none  Progression: worsening discomfort  Quality:    sharp pain   Muscle spasms at night  Radiation: none  Severity: per nursing documentation  Timing: intermittent w/ use  Trauma: none    Review of Systems (ROS)  A 10+ review of systems was performed with pertinent positives and negatives noted above in the history of present illness. Other systems were negative unless otherwise specified.    Physical Examination (PE)  General:  The patient is alert and oriented x 3. Mood is pleasant. Observation of ears, eyes and nose reveal no gross abnormalities. HEENT: NCAT, sclera anicteric.   Lungs: Respirations are equal and unlabored.  Gait is coordinated. Patient can toe walk and heel walk without difficulty.    KIEL KNEE EXAMINATION    Observation/Inspection  Gait:   Nonantalgic   Alignment:  Neutral   Scars:   None   Muscle atrophy: Mild  Effusion:  None   Warmth:  None   Discoloration:   none     Tenderness / Crepitus (T / C):         T / C      T / C  Patella   - / -   Lateral joint line   - / -     Peripatellar medial  -  Medial joint line    + / - resolved  Peripatellar lateral -  Medial plica   - / -  Patellar tendon -   Popliteal fossa   + / - Resolved  Quad tendon   -   Gastrocnemius   -  Prepatellar Bursa - / -   Quadricep   -  Tibial tubercle  -  Thigh/hamstring  -  Pes anserine/HS -  Fibula    -  ITB   - / -  Tibia     -  Tib/fib  joint  - / -  LCL    -    MFC   - / -   MCL: Proximal  -    LFC   - / -   Distal    -          ROM: (* = pain)  PASSIVE   ACTIVE    Left :   5 / 0 / 145   5 / 0 / 145     Right :    5 / 0 / 145   5 / 0 / 145    Patellofemoral examination:  See above noted areas of tenderness.   Patella position    Subluxation / dislocation: Centered        Sup. / Inf;   Normal   Crepitus (PF):    Absent   Patellar Mobility:       Medial-lateral:   Normal    Superior-inferior:  Normal    Inferior tilt   Normal    Patellar tendon:  Normal   Lateral tilt:    Normal   J-sign:     None   Patellofemoral grind:   No pain     Meniscal Signs:   RESOLVED  Pain on terminal extension:  +   Pain on terminal flexion:  +  Andressas maneuver:  +*  Squat     NT    Ligament Examination:  ACL / Lachman:  WNL  PCL-Post.  drawer: normal 0 to 2mm  MCL- Valgus:  normal 0 to 2mm  LCL- Varus:    normal 0 to 2mm  Pivot shift:  guarding   Dial Test:   difference c/w other side   At 30° flexion: normal (< 5°)    At 90° flexion: normal (< 5°)   Reverse Pivot Shift:   normal (Equal)     Strength: (* = with pain) Painful Side  Quadriceps   5/5  Hamstrin/5    Extremity Neuro-vascular Examination:   Sensation:  Grossly intact to light touch all dermatomal regions.   Motor Function:  Fully intact motor function at hip, knee, foot and ankle    DTRs;  quadriceps and  achilles 2+.  No clonus and downgoing Babinski.    Vascular status:  DP and PT pulses 2+, brisk capillary refill, symmetric.     Other Findings:    ASSESSMENT & PLAN  Assessment:   #1 Kellgren-Jeyson Grade III osteoarthritis of knee, edward med compartment, bilat  W/ knee pain, left >>> right    No evidence of neurologic pathology    Imaging studies reviewed:   X-ray knee, bilateral 19.11    Plan:    We discussed the importance of appropriate diet, weight, and regular exercise including quadriceps strengthening     We discussed options including:  #1 watchful waiting  #2 re start physical  therapy aimed at:   Core stability   RoM knee   Strengthening quadriceps   Gait training   #3 injection therapy:   CSI iaknee     Right, effective good%, repeat    Left, effective good%, repeat   VSI iaknee    Right,     Left,    Orthobiologics   #4 consultation re: TKA LEFT     The patient chooses #3 csi iaknee BILAT    Pain management: handout given  Bracing:   Physical therapy: fPT, @ [facility], prior as above  modest experience this past time  Activity (e.g. 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

## 2020-01-27 ENCOUNTER — OFFICE VISIT (OUTPATIENT)
Dept: SPORTS MEDICINE | Facility: CLINIC | Age: 70
End: 2020-01-27
Payer: MEDICARE

## 2020-01-27 VITALS — WEIGHT: 155 LBS | TEMPERATURE: 98 F | BODY MASS INDEX: 24.91 KG/M2 | HEIGHT: 66 IN

## 2020-01-27 DIAGNOSIS — M25.562 BILATERAL CHRONIC KNEE PAIN: Primary | ICD-10-CM

## 2020-01-27 DIAGNOSIS — M25.561 BILATERAL CHRONIC KNEE PAIN: Primary | ICD-10-CM

## 2020-01-27 DIAGNOSIS — G89.29 BILATERAL CHRONIC KNEE PAIN: Primary | ICD-10-CM

## 2020-01-27 DIAGNOSIS — M17.0 PRIMARY OSTEOARTHRITIS OF BOTH KNEES: ICD-10-CM

## 2020-01-27 PROCEDURE — 1159F PR MEDICATION LIST DOCUMENTED IN MEDICAL RECORD: ICD-10-PCS | Mod: S$GLB,,, | Performed by: FAMILY MEDICINE

## 2020-01-27 PROCEDURE — 3288F PR FALLS RISK ASSESSMENT DOCUMENTED: ICD-10-PCS | Mod: CPTII,S$GLB,, | Performed by: FAMILY MEDICINE

## 2020-01-27 PROCEDURE — 1100F PTFALLS ASSESS-DOCD GE2>/YR: CPT | Mod: CPTII,S$GLB,, | Performed by: FAMILY MEDICINE

## 2020-01-27 PROCEDURE — 99214 OFFICE O/P EST MOD 30 MIN: CPT | Mod: 25,S$GLB,, | Performed by: FAMILY MEDICINE

## 2020-01-27 PROCEDURE — 99214 PR OFFICE/OUTPT VISIT, EST, LEVL IV, 30-39 MIN: ICD-10-PCS | Mod: 25,S$GLB,, | Performed by: FAMILY MEDICINE

## 2020-01-27 PROCEDURE — 1159F MED LIST DOCD IN RCRD: CPT | Mod: S$GLB,,, | Performed by: FAMILY MEDICINE

## 2020-01-27 PROCEDURE — 20611 DRAIN/INJ JOINT/BURSA W/US: CPT | Mod: 50,S$GLB,, | Performed by: FAMILY MEDICINE

## 2020-01-27 PROCEDURE — 20611 LARGE JOINT ASPIRATION/INJECTION: R KNEE, L KNEE: ICD-10-PCS | Mod: 50,S$GLB,, | Performed by: FAMILY MEDICINE

## 2020-01-27 PROCEDURE — 99999 PR PBB SHADOW E&M-EST. PATIENT-LVL III: CPT | Mod: PBBFAC,,, | Performed by: FAMILY MEDICINE

## 2020-01-27 PROCEDURE — 99999 PR PBB SHADOW E&M-EST. PATIENT-LVL III: ICD-10-PCS | Mod: PBBFAC,,, | Performed by: FAMILY MEDICINE

## 2020-01-27 PROCEDURE — 1125F PR PAIN SEVERITY QUANTIFIED, PAIN PRESENT: ICD-10-PCS | Mod: S$GLB,,, | Performed by: FAMILY MEDICINE

## 2020-01-27 PROCEDURE — 3288F FALL RISK ASSESSMENT DOCD: CPT | Mod: CPTII,S$GLB,, | Performed by: FAMILY MEDICINE

## 2020-01-27 PROCEDURE — 1100F PR PT FALLS ASSESS DOC 2+ FALLS/FALL W/INJURY/YR: ICD-10-PCS | Mod: CPTII,S$GLB,, | Performed by: FAMILY MEDICINE

## 2020-01-27 PROCEDURE — 1125F AMNT PAIN NOTED PAIN PRSNT: CPT | Mod: S$GLB,,, | Performed by: FAMILY MEDICINE

## 2020-01-27 RX ORDER — TRIAMCINOLONE ACETONIDE 40 MG/ML
40 INJECTION, SUSPENSION INTRA-ARTICULAR; INTRAMUSCULAR
Status: DISCONTINUED | OUTPATIENT
Start: 2020-01-27 | End: 2020-01-27 | Stop reason: HOSPADM

## 2020-01-27 RX ADMIN — TRIAMCINOLONE ACETONIDE 40 MG: 40 INJECTION, SUSPENSION INTRA-ARTICULAR; INTRAMUSCULAR at 11:01

## 2020-01-27 NOTE — PROGRESS NOTES
Sagrario Freire, a 69 y.o. female, presents today for evaluation of her LEFT and RIGHT knee.      History of Present Illness (HPI)  Location: medial/posterior knee, L>R  Onset: Chronic, >2 years  Palliative:    Relative rest   Oral analgesics   CSI, non [facility] about 2 years ago   CSI, L/R Odalis, 18.11.07, 90% improvement   fPT, @[facility] - 19.01.15 - 19.02.12 , minimal Improvement   CSI, PARKER. iaCharleenee, 04.22.2019, 60% Improvement for 4 weeks   CSI, PARKER. iaCharleenee, 07.15.2019   VSI (Euflexxa), L. iaCharleenee, 08.19.2019, 25% Improvement   CSI, L/R iaCharleenee, 11.18.2019, L. 100% Improvement for 12 weeks R. 100% continued improvement as of 1.27.2020  Provocative:   ADLS   Prolonged ambulation  Prior: none  Progression: worsening discomfort  Quality:    sharp pain   Muscle spasms at night  Radiation: none  Severity: per nursing documentation  Timing: intermittent w/ use  Trauma: none    Review of Systems (ROS)  A 10+ review of systems was performed with pertinent positives and negatives noted above in the history of present illness. Other systems were negative unless otherwise specified.    Physical Examination (PE)  General:  The patient is alert and oriented x 3. Mood is pleasant. Observation of ears, eyes and nose reveal no gross abnormalities. HEENT: NCAT, sclera anicteric.   Lungs: Respirations are equal and unlabored.  Gait is coordinated. Patient can toe walk and heel walk without difficulty.    KIEL KNEE EXAMINATION    Observation/Inspection  Gait:   Nonantalgic   Alignment:  Neutral   Scars:   None   Muscle atrophy: Mild  Effusion:  None   Warmth:  None   Discoloration:   none     Tenderness / Crepitus (T / C):         T / C      T / C  Patella   - / -   Lateral joint line   - / -     Peripatellar medial  -  Medial joint line    + / - resolved  Peripatellar lateral -  Medial plica   - / -  Patellar tendon -   Popliteal fossa   + / - Resolved  Quad tendon   -   Gastrocnemius   -  Prepatellar Bursa - / -   Quadricep   -  Tibial  tubercle  -  Thigh/hamstring  -  Pes anserine/HS -  Fibula    -  ITB   - / -  Tibia     -  Tib/fib joint  - / -  LCL    -    MFC   - / -   MCL: Proximal  -    LFC   - / -   Distal    -          ROM: (* = pain)  PASSIVE   ACTIVE    Left :   5 / 0 / 145   5 / 0 / 145     Right :    5 / 0 / 145   5 / 0 / 145    Patellofemoral examination:  See above noted areas of tenderness.   Patella position    Subluxation / dislocation: Centered        Sup. / Inf;   Normal   Crepitus (PF):    Absent   Patellar Mobility:       Medial-lateral:   Normal    Superior-inferior:  Normal    Inferior tilt   Normal    Patellar tendon:  Normal   Lateral tilt:    Normal   J-sign:     None   Patellofemoral grind:   No pain     Meniscal Signs:   RESOLVED  Pain on terminal extension:  +   Pain on terminal flexion:  +  Andressas maneuver:  +*  Squat     NT    Ligament Examination:  ACL / Lachman:  WNL  PCL-Post.  drawer: normal 0 to 2mm  MCL- Valgus:  normal 0 to 2mm  LCL- Varus:    normal 0 to 2mm  Pivot shift:  guarding   Dial Test:   difference c/w other side   At 30° flexion: normal (< 5°)    At 90° flexion: normal (< 5°)   Reverse Pivot Shift:   normal (Equal)     Strength: (* = with pain) Painful Side  Quadriceps   5/5  Hamstrin/5    Extremity Neuro-vascular Examination:   Sensation:  Grossly intact to light touch all dermatomal regions.   Motor Function:  Fully intact motor function at hip, knee, foot and ankle    DTRs;  quadriceps and  achilles 2+.  No clonus and downgoing Babinski.    Vascular status:  DP and PT pulses 2+, brisk capillary refill, symmetric.     Other Findings:    ASSESSMENT & PLAN  Assessment:   #1 Kellgren-Jeyson Grade III osteoarthritis of knee, edward med compartment, bilat  W/ knee pain, left >>> right    No evidence of neurologic pathology    Imaging studies reviewed:   X-ray knee, bilateral 19.11    Plan:    We discussed the importance of appropriate diet, weight, and regular exercise    We discussed  options including:    Watchful waiting / relative rest    Physical therapy x   Injection therapy csi iaknee b   Consultation    The patient chooses:  As above   x = prescribed  CSI = corticosteroid injection  VSI = viscosupplement injection  PRPI = platelet rich plasma injection  ia = intra articular  R = right  L = left  B = bilateral     Physical Therapy        Formal (fPT), @ [facility] facility p   Formal (fPT), @ [facility] facility        Homegoing (hgPT), per concurrent fPT recommendations    Homegoing (hgPT), per prior fPT recommendations t   Homegoing (hgPT), handout provided        w/  (atPT)    [blank] = not prescribed  x = prescribed  b = prescribed, and begin as indicated  t = continue as indicated  r = prescribed, and restart as indicated  p = completed prior as indicated  hs = prescribed, and with high school   col = prescribed, and with college or university   nf = physical therapy was recommended, but patient is not interested in PT at this time    Activity (e.g. sports, work) restrictions    [blank] = as tolerated  pt = per physical therapist  at = per     Bracing    [blank] = not prescribed  r = recommended, but not fit with at todays visit  f = prescribed and fit with at todays visit  t = continue as indicated    Pain management    [blank] = No prescription necessary. A handout detailing dosing of appropriate over-the-counter musculoskeletal analgesics was made available to the patient.   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

## 2020-01-27 NOTE — PROCEDURES
"Large Joint Aspiration/Injection: R knee, L knee  Date/Time: 1/27/2020 11:30 AM  Performed by: Kendrick Freire MD  Authorized by: Kendrick Freire MD     Consent Done?:  Yes (Verbal)  Indications:  Pain  Procedure site marked: Yes    Timeout: Prior to procedure the correct patient, procedure, and site was verified      Location:  Knee  Site:  R knee and L knee  Prep: Patient was prepped and draped in usual sterile fashion    Needle size:  20 G  Ultrasonic Guidance for needle placement: Yes  Images are saved and documented.  Approach:  Lateral  Medications:  40 mg triamcinolone acetonide 40 mg/mL; 40 mg triamcinolone acetonide 40 mg/mL  Patient tolerance:  Patient tolerated the procedure well with no immediate complications    Additional Comments: Description of ultrasound utilization for needle guidance:   Ultrasound guidance used for needle localization. Images saved and stored for documentation. The knee joint was visualized. Dynamic visualization of the 20g x 3.5" needle was continuous throughout the procedure.      "

## 2020-02-10 ENCOUNTER — HOSPITAL ENCOUNTER (OUTPATIENT)
Dept: RADIOLOGY | Facility: HOSPITAL | Age: 70
Discharge: HOME | End: 2020-02-10
Attending: FAMILY MEDICINE
Payer: MEDICARE

## 2020-02-10 ENCOUNTER — TELEPHONE (OUTPATIENT)
Dept: SPORTS MEDICINE | Facility: CLINIC | Age: 70
End: 2020-02-10

## 2020-02-10 ENCOUNTER — OFFICE VISIT (OUTPATIENT)
Dept: SPORTS MEDICINE | Facility: CLINIC | Age: 70
End: 2020-02-10
Payer: MEDICARE

## 2020-02-10 VITALS — TEMPERATURE: 98 F | BODY MASS INDEX: 25.83 KG/M2 | HEIGHT: 65 IN | WEIGHT: 155 LBS

## 2020-02-10 DIAGNOSIS — M25.551 RIGHT HIP PAIN: Primary | ICD-10-CM

## 2020-02-10 DIAGNOSIS — M25.551 RIGHT HIP PAIN: ICD-10-CM

## 2020-02-10 PROCEDURE — 1159F PR MEDICATION LIST DOCUMENTED IN MEDICAL RECORD: ICD-10-PCS | Mod: S$GLB,,, | Performed by: FAMILY MEDICINE

## 2020-02-10 PROCEDURE — 1159F MED LIST DOCD IN RCRD: CPT | Mod: S$GLB,,, | Performed by: FAMILY MEDICINE

## 2020-02-10 PROCEDURE — 99999 PR PBB SHADOW E&M-EST. PATIENT-LVL III: ICD-10-PCS | Mod: PBBFAC,,, | Performed by: FAMILY MEDICINE

## 2020-02-10 PROCEDURE — 73502 XR HIP 2 VIEW RIGHT: ICD-10-PCS | Mod: 26,RT,, | Performed by: RADIOLOGY

## 2020-02-10 PROCEDURE — 1125F AMNT PAIN NOTED PAIN PRSNT: CPT | Mod: S$GLB,,, | Performed by: FAMILY MEDICINE

## 2020-02-10 PROCEDURE — 1101F PT FALLS ASSESS-DOCD LE1/YR: CPT | Mod: CPTII,S$GLB,, | Performed by: FAMILY MEDICINE

## 2020-02-10 PROCEDURE — 99214 OFFICE O/P EST MOD 30 MIN: CPT | Mod: S$GLB,,, | Performed by: FAMILY MEDICINE

## 2020-02-10 PROCEDURE — 73502 X-RAY EXAM HIP UNI 2-3 VIEWS: CPT | Mod: 26,RT,, | Performed by: RADIOLOGY

## 2020-02-10 PROCEDURE — 1101F PR PT FALLS ASSESS DOC 0-1 FALLS W/OUT INJ PAST YR: ICD-10-PCS | Mod: CPTII,S$GLB,, | Performed by: FAMILY MEDICINE

## 2020-02-10 PROCEDURE — 99214 PR OFFICE/OUTPT VISIT, EST, LEVL IV, 30-39 MIN: ICD-10-PCS | Mod: S$GLB,,, | Performed by: FAMILY MEDICINE

## 2020-02-10 PROCEDURE — 99999 PR PBB SHADOW E&M-EST. PATIENT-LVL III: CPT | Mod: PBBFAC,,, | Performed by: FAMILY MEDICINE

## 2020-02-10 PROCEDURE — 1125F PR PAIN SEVERITY QUANTIFIED, PAIN PRESENT: ICD-10-PCS | Mod: S$GLB,,, | Performed by: FAMILY MEDICINE

## 2020-02-10 PROCEDURE — 73502 X-RAY EXAM HIP UNI 2-3 VIEWS: CPT | Mod: TC,RT

## 2020-02-10 RX ORDER — TRIAMCINOLONE ACETONIDE 40 MG/ML
40 INJECTION, SUSPENSION INTRA-ARTICULAR; INTRAMUSCULAR
Status: DISCONTINUED | OUTPATIENT
Start: 2020-02-10 | End: 2020-02-10

## 2020-02-10 RX ORDER — MELOXICAM 7.5 MG/1
7.5 TABLET ORAL DAILY
Qty: 15 TABLET | Refills: 0 | Status: SHIPPED | OUTPATIENT
Start: 2020-02-10 | End: 2020-03-10

## 2020-02-10 NOTE — TELEPHONE ENCOUNTER
Called Sagrario Freire to schedule an appointment.  I was unable to reach the patient, I left patient a voicemail to return my call.    Kev Aguilar   Sports Medicine Assistant   [facility]     ----- Message from Kev Aguilar MA sent at 2/10/2020  1:26 PM CST -----      ----- Message -----  From: Susan Dennis  Sent: 2/10/2020  11:21 AM CST  To: Tani BLANCHARD Staff    Pt is calling [facility] and would like a call back [phone]      meloxicam (MOBIC) 7.5 MG tablet

## 2020-02-10 NOTE — PROCEDURES
"Large Joint Aspiration/Injection: R greater trochanteric bursa  Performed by: Kendrick Freire MD  Authorized by: Kendrick Freire MD  Date/Time: 2/10/2020 9:30 AM    Consent Done?:  Yes (Verbal)  Indications:  pain  Timeout: Immediately prior to procedure a time out was called to verify the correct patient, procedure, equipment, support staff and site/side marked as required.  Prep:Patient was prepped and draped in the usual sterile fashion.  Procedure site marked: Yes     Anesthesia  Local anesthesia not used        Details:   Needle size: 22 G  Ultrasonic Guidance for needle placement: Yes  Images are saved and documented.   Approach: lateral  Location:  Hip  Site:  R greater trochanteric bursa    Medications: 40 mg triamcinolone acetonide 40 mg/mL  Patient tolerance:  patient tolerated the procedure well with no immediate complications    Description of ultrasound utilization for needle guidance:   Ultrasound guidance used for needle localization. Images saved and stored for documentation. The greater trochanter and its bursa were visualized. Dynamic visualization of the 22g x 3.5" needle was continuous throughout the procedure.         "

## 2020-02-10 NOTE — PROGRESS NOTES
Sagrario Freire, a 69 y.o. female, is here for evaluation of Right hip.     HISTORY OF PRESENT ILLNESS   Location: lateral thigh, right  Onset: acute, 02.09.2020  Palliative:    Relative rest   Oral analgesics   ICE  Provocative:   ADLs  ambulation  Prior: No hx of Orthopaedic surgery  Progression: worsening discomfort   Quality:    sharp   keeps her up at night  Radiation: Right LE to mid calf  Severity: per nursing documentation  Timing: intermittent with use  Trauma: 02.09.2020 - pt. fell off of a 2 foot stepstool; JUNIOR twisted right and landed directly on her right hip     Review of systems (ROS):  A 10+ review of systems was performed with pertinent positives and negatives noted above in the history of present illness. Other systems were negative unless otherwise specified.      PHYSICAL EXAMINATION  General:  The patient is alert and oriented x 3.  Mood is pleasant.  Observation of ears, eyes and nose reveal no gross abnormalities.  HEENT: NCAT, sclera nonicteric  Lungs: Respirations are equal and unlabored.   Gait is coordinated. Patient can toe walk and heel walk without difficulty.    HIP/PELVIS EXAMINATION    Observation/Inspection  Gait:   antalgic   Alignment:  Neutral   Scars:   None   Muscle atrophy: None   Effusion:  None   Warmth:  None   Discoloration:   None   Leg lengths:   Equal   Pelvis:    Level     Tenderness/Crepitus (T/C):      T / C  Trochanteric bursa   + / -  Piriformis    - / -  SI joint    - / -  Psoas tendon   - / -  Rectus insertion  - / -  Adductor insertion  - / -  Pubic symphysis  - / -    ROM: (* = pain)    Flexion:      120 degrees *  External rotation:   40 degrees  Internal rotation with axial load:  30 degrees  Internal rotation without axial load:  40 degrees  Abduction:    45 degrees  Adduction:     20 degrees    Special Tests:  Pain w/ forced internal rotation (FADIR):  NEG   Pain w/ forced external rotation (THUAN):  POS   Circumduction test:     -  Britton  test:     -   Log roll:       -   Snapping hip (internal):    -   Sit-up pain:      -   Resisted sit-up pain:     -   Resisted sit-up with adductor contraction pain:  -   Step-down test:     +  Trendelenburg test:     -  Bridge test      +     Extremity Neuro-vascular Examination:   Sensation:  Grossly intact to light touch all dermatomal regions.   Motor Function:  Fully intact motor function at hip, knee, foot and ankle    DTRs;  quadriceps and  achilles 2+.  No clonus and downgoing Babinski.    Vascular status:  DP and PT pulses 2+, brisk capillary refill, symmetric.    Skin:  intact, compartments soft.    Other Findings:    ASSESSMENT & PLAN  Assessment:   #1 concern for greater trochanter fracture vs. Contusion, right    Tonnis Grade II osteoarthritis of hip, right   W/ greater trochanteric bursitis    No evidence of acute osseous pathology   No evidence of neurologic pathology  No evidence of vascular pathology    Imaging studies reviewed:   X-ray pelvis and hip, right 20.02    Plan:    Given extreme dysfunction and discomfort, and non-diagnostic x-ray imaging, we will obtain MRI imaging for further evaluation of the osseous and soft tissue structures of the hip.    We discussed the importance of appropriate diet, weight, and regular exercise    We discussed options including:    Watchful waiting / relative rest x   Physical therapy    Injection therapy    Consultation    The patient chooses:  As above   x = prescribed  CSI = corticosteroid injection  VSI = viscosupplement injection  PRPI = platelet rich plasma injection  ia = intra articular  R = right  L = left  B = bilateral     Physical Therapy        Formal (fPT), @ [facility] facility    Formal (fPT), @ [facility] facility        Homegoing (hgPT), per concurrent fPT recommendations    Homegoing (hgPT), per prior fPT recommendations    Homegoing (hgPT), handout provided        w/  (atPT)    [blank] = not prescribed  x = prescribed  b = prescribed,  and begin as indicated  t = continue as indicated  r = prescribed, and restart as indicated  p = completed prior as indicated  hs = prescribed, and with high school   col = prescribed, and with [facility] or university   nf = physical therapy was recommended, but patient is not interested in PT at this time    Activity (e.g. sports, work) restrictions    [blank] = as tolerated  pt = per physical therapist  at = per     Bracing Crutches, offered, deferred by pt   [blank] = not prescribed  r = recommended, but not fit with at todays visit  f = prescribed and fit with at todays visit  t = continue as indicated    Pain management    [blank] = No prescription necessary. A handout detailing dosing of appropriate over-the-counter musculoskeletal analgesics was made available to the patient.   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

## 2020-03-10 RX ORDER — MELOXICAM 7.5 MG/1
TABLET ORAL
Qty: 15 TABLET | Refills: 0 | Status: SHIPPED | OUTPATIENT
Start: 2020-03-10 | End: 2021-01-06 | Stop reason: SDUPTHER

## 2020-06-09 ENCOUNTER — OFFICE VISIT (OUTPATIENT)
Dept: SPORTS MEDICINE | Facility: CLINIC | Age: 70
End: 2020-06-09
Payer: MEDICARE

## 2020-06-09 VITALS — BODY MASS INDEX: 25.83 KG/M2 | HEIGHT: 65 IN | WEIGHT: 155 LBS | TEMPERATURE: 98 F

## 2020-06-09 DIAGNOSIS — M25.562 BILATERAL CHRONIC KNEE PAIN: ICD-10-CM

## 2020-06-09 DIAGNOSIS — M17.0 PRIMARY OSTEOARTHRITIS OF BOTH KNEES: Primary | ICD-10-CM

## 2020-06-09 DIAGNOSIS — M25.561 BILATERAL CHRONIC KNEE PAIN: ICD-10-CM

## 2020-06-09 DIAGNOSIS — R26.89 ANTALGIC GAIT: ICD-10-CM

## 2020-06-09 DIAGNOSIS — G89.29 BILATERAL CHRONIC KNEE PAIN: ICD-10-CM

## 2020-06-09 PROCEDURE — 1125F PR PAIN SEVERITY QUANTIFIED, PAIN PRESENT: ICD-10-PCS | Mod: S$GLB,,, | Performed by: FAMILY MEDICINE

## 2020-06-09 PROCEDURE — 99214 PR OFFICE/OUTPT VISIT, EST, LEVL IV, 30-39 MIN: ICD-10-PCS | Mod: 25,S$GLB,, | Performed by: FAMILY MEDICINE

## 2020-06-09 PROCEDURE — 20611 DRAIN/INJ JOINT/BURSA W/US: CPT | Mod: 50,S$GLB,, | Performed by: FAMILY MEDICINE

## 2020-06-09 PROCEDURE — 99999 PR PBB SHADOW E&M-EST. PATIENT-LVL III: ICD-10-PCS | Mod: PBBFAC,,, | Performed by: FAMILY MEDICINE

## 2020-06-09 PROCEDURE — 1101F PR PT FALLS ASSESS DOC 0-1 FALLS W/OUT INJ PAST YR: ICD-10-PCS | Mod: CPTII,S$GLB,, | Performed by: FAMILY MEDICINE

## 2020-06-09 PROCEDURE — 1159F MED LIST DOCD IN RCRD: CPT | Mod: S$GLB,,, | Performed by: FAMILY MEDICINE

## 2020-06-09 PROCEDURE — 1125F AMNT PAIN NOTED PAIN PRSNT: CPT | Mod: S$GLB,,, | Performed by: FAMILY MEDICINE

## 2020-06-09 PROCEDURE — 99999 PR PBB SHADOW E&M-EST. PATIENT-LVL III: CPT | Mod: PBBFAC,,, | Performed by: FAMILY MEDICINE

## 2020-06-09 PROCEDURE — 1101F PT FALLS ASSESS-DOCD LE1/YR: CPT | Mod: CPTII,S$GLB,, | Performed by: FAMILY MEDICINE

## 2020-06-09 PROCEDURE — 1159F PR MEDICATION LIST DOCUMENTED IN MEDICAL RECORD: ICD-10-PCS | Mod: S$GLB,,, | Performed by: FAMILY MEDICINE

## 2020-06-09 PROCEDURE — 20611 LARGE JOINT ASPIRATION/INJECTION: BILATERAL KNEE: ICD-10-PCS | Mod: 50,S$GLB,, | Performed by: FAMILY MEDICINE

## 2020-06-09 PROCEDURE — 99214 OFFICE O/P EST MOD 30 MIN: CPT | Mod: 25,S$GLB,, | Performed by: FAMILY MEDICINE

## 2020-06-09 RX ORDER — TRIAMCINOLONE ACETONIDE 40 MG/ML
40 INJECTION, SUSPENSION INTRA-ARTICULAR; INTRAMUSCULAR
Status: DISCONTINUED | OUTPATIENT
Start: 2020-06-09 | End: 2020-06-09 | Stop reason: HOSPADM

## 2020-06-09 RX ADMIN — TRIAMCINOLONE ACETONIDE 40 MG: 40 INJECTION, SUSPENSION INTRA-ARTICULAR; INTRAMUSCULAR at 01:06

## 2020-06-09 NOTE — PROCEDURES
"Large Joint Aspiration/Injection: bilateral knee  Date/Time: 6/9/2020 1:30 PM  Performed by: Kendrick Freire MD  Authorized by: Kendrick Freire MD     Consent Done?:  Yes (Verbal)  Indications:  Pain  Site marked: the procedure site was marked    Timeout: prior to procedure the correct patient, procedure, and site was verified    Prep: patient was prepped and draped in usual sterile fashion    Local anesthesia used?: No      Details:  Needle Size:  20 G  Ultrasonic Guidance for needle placement?: Yes    Images are saved and documented.  Approach:  Lateral  Location:  Knee  Laterality:  Bilateral  Site:  Bilateral knee  Medications (Right):  40 mg triamcinolone acetonide 40 mg/mL  Medications (Left):  40 mg triamcinolone acetonide 40 mg/mL  Patient tolerance:  Patient tolerated the procedure well with no immediate complications     Description of ultrasound utilization for needle guidance:   Ultrasound guidance used for needle localization. Images saved and stored for documentation. The knee joint was visualized. Dynamic visualization of the 20g x 3.5" needle was continuous throughout the procedure.       "

## 2020-06-09 NOTE — PROGRESS NOTES
Sagrario Freire, a 70 y.o. female, presents today for evaluation of her LEFT and RIGHT knee.      History of Present Illness (HPI)  Location: medial/posterior knee, L>R  Onset: Chronic, >2 years  Palliative:    Relative rest   Oral analgesics   CSI, non [facility] about 2 years ago   CSI, L/R iaKnee, 18.11.07, 90% improvement   [facility]PT, @[facility] - 19.01.15 - 19.02.12 , minimal Improvement   CSI, L. iaKnee, 04.22.2019, 60% Improvement for 4 weeks   CSI, L. iaKnee, 07.15.2019   VSI (Euflexxa), L. iaCharleenee, 08.19.2019, 25% Improvement   [facility]: [facility] duration: 7, minimal Improvement, d/c on 02.12.2019   CSI, L/R iaKnee, 11.18.2019, L. 100% Improvement for 12 weeks R. 100% continued improvement as of 1.27.2020   CSI, L/R iaKnee, 01.27.2020 80% Improvement for 8 weeks  Provocative:   ADLS   Prolonged ambulation  Prior: none  Progression: worsening discomfort  Quality:    sharp pain   Muscle spasms at night  Radiation: none  Severity: per nursing documentation  Timing: intermittent w/ use  Trauma: none    Review of Systems (ROS)  A 10+ review of systems was performed with pertinent positives and negatives noted above in the history of present illness. Other systems were negative unless otherwise specified.    Physical Examination (PE)  General:  The patient is alert and oriented x 3. Mood is pleasant. Observation of ears, eyes and nose reveal no gross abnormalities. HEENT: NCAT, sclera anicteric.   Lungs: Respirations are equal and unlabored.  Gait is coordinated. Patient can toe walk and heel walk without difficulty.    KIEL KNEE EXAMINATION    Observation/Inspection  Gait:   Nonantalgic   Alignment:  Neutral   Scars:   None   Muscle atrophy: Mild  Effusion:  None   Warmth:  None   Discoloration:   none     Tenderness / Crepitus (T / C):         T / C      T / C  Patella   - / -   Lateral joint line   - / -     Peripatellar medial  -  Medial joint line    ++ / -   Peripatellar lateral -  Medial plica   - /  -  Patellar tendon -   Popliteal fossa   ++ / -   Quad tendon   -   Gastrocnemius   -  Prepatellar Bursa - / -   Quadricep   -  Tibial tubercle  -  Thigh/hamstring  -  Pes anserine/HS -  Fibula    -  ITB   - / -  Tibia     -  Tib/fib joint  - / -  LCL    -    MFC   - / -   MCL: Proximal  -    LFC   - / -   Distal    -          ROM: (* = pain)  PASSIVE   ACTIVE    Left :   5 / 0 / 145   5 / 0 / 145     Right :    5 / 0 / 145   5 / 0 / 145    Patellofemoral examination:  See above noted areas of tenderness.   Patella position    Subluxation / dislocation: Centered        Sup. / Inf;   Normal   Crepitus (PF):    Absent   Patellar Mobility:       Medial-lateral:   Normal    Superior-inferior:  Normal    Inferior tilt   Normal    Patellar tendon:  Normal   Lateral tilt:    Normal   J-sign:     None   Patellofemoral grind:   No pain     Meniscal Signs:     Pain on terminal extension:  +   Pain on terminal flexion:  +  Andressas maneuver:  +*  Squat     NT    Ligament Examination:  ACL / Lachman:  WNL  PCL-Post.  drawer: normal 0 to 2mm  MCL- Valgus:  normal 0 to 2mm  LCL- Varus:    normal 0 to 2mm  Pivot shift:  guarding   Dial Test:   difference c/w other side   At 30° flexion: normal (< 5°)    At 90° flexion: normal (< 5°)   Reverse Pivot Shift:   normal (Equal)     Strength: (* = with pain) Painful Side  Quadriceps   5/5  Hamstrin/5    Extremity Neuro-vascular Examination:   Sensation:  Grossly intact to light touch all dermatomal regions.   Motor Function:  Fully intact motor function at hip, knee, foot and ankle    DTRs;  quadriceps and  achilles 2+.  No clonus and downgoing Babinski.    Vascular status:  DP and PT pulses 2+, brisk capillary refill, symmetric.     Other Findings:    ASSESSMENT & PLAN  Assessment:   #1 Kellgren-Jeyson Grade III osteoarthritis of knee, edward med compartment, bilat  W/ knee pain, left >> right    No evidence of neurologic pathology    Imaging studies reviewed:   X-ray knee,  bilateral 19.11    Plan:    We discussed the importance of appropriate diet, weight, and regular exercise    We discussed options including    Watchful waiting / relative rest    Physical therapy x   Injection therapy csi iaknee b   Consultation nf   The patient chooses As above   x = prescribed  CSI = corticosteroid injection  VSI = viscosupplement injection  PRPI = platelet rich plasma injection  ia = intra articular  R = right  L = left  B = bilateral   nfSx = surgical consultation was recommended, but patient is not interested in consultation at this time    Physical Therapy        Formal (fPT), @ [facility] facility p   Formal (fPT), @ [facility] facility        Homegoing (hgPT), per concurrent fPT recommendations    Homegoing (hgPT), per prior fPT recommendations t   Homegoing (hgPT), handout provided        w/  (atPT)    [blank] = not prescribed  x = prescribed  b = prescribed, and begin as indicated  t = continue as indicated  r = prescribed, and restart as indicated  p = completed prior as indicated  hs = prescribed, and with high school   col = prescribed, and with [facility] or university   nfPT = physical therapy was recommended, but patient is not interested in PT at this time    Activity (e.g. sports, work) restrictions    [blank] = as tolerated  pt = per physical therapist  at = per     Bracing    [blank] = not prescribed  r = recommended, but not fit with at todays visit  f = prescribed and fit with at todays visit  t = continue as indicated  p = prn use on rare, as-needed basis; advised against chronic use    Pain management    [blank] = No prescription necessary. A handout detailing dosing of appropriate   over-the-counter musculoskeletal analgesics was made available to the patient.   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

## 2020-08-19 ENCOUNTER — HOSPITAL ENCOUNTER (OUTPATIENT)
Dept: RADIOLOGY | Facility: HOSPITAL | Age: 70
Discharge: HOME | End: 2020-08-19
Attending: FAMILY MEDICINE
Payer: MEDICARE

## 2020-08-19 ENCOUNTER — OFFICE VISIT (OUTPATIENT)
Dept: SPORTS MEDICINE | Facility: CLINIC | Age: 70
End: 2020-08-19
Payer: MEDICARE

## 2020-08-19 VITALS — WEIGHT: 145 LBS | HEIGHT: 66 IN | BODY MASS INDEX: 23.3 KG/M2

## 2020-08-19 DIAGNOSIS — M25.561 CHRONIC PAIN OF RIGHT KNEE: ICD-10-CM

## 2020-08-19 DIAGNOSIS — M25.561 RIGHT MEDIAL KNEE PAIN: ICD-10-CM

## 2020-08-19 DIAGNOSIS — G89.29 CHRONIC PAIN OF RIGHT KNEE: ICD-10-CM

## 2020-08-19 DIAGNOSIS — M17.11 PRIMARY OSTEOARTHRITIS OF RIGHT KNEE: ICD-10-CM

## 2020-08-19 DIAGNOSIS — R26.89 ANTALGIC GAIT: ICD-10-CM

## 2020-08-19 DIAGNOSIS — M17.11 PRIMARY OSTEOARTHRITIS OF RIGHT KNEE: Primary | ICD-10-CM

## 2020-08-19 PROCEDURE — 1101F PR PT FALLS ASSESS DOC 0-1 FALLS W/OUT INJ PAST YR: ICD-10-PCS | Mod: CPTII,S$GLB,, | Performed by: FAMILY MEDICINE

## 2020-08-19 PROCEDURE — 1101F PT FALLS ASSESS-DOCD LE1/YR: CPT | Mod: CPTII,S$GLB,, | Performed by: FAMILY MEDICINE

## 2020-08-19 PROCEDURE — 20611 LARGE JOINT ASPIRATION/INJECTION: R KNEE: ICD-10-PCS | Mod: RT,S$GLB,, | Performed by: FAMILY MEDICINE

## 2020-08-19 PROCEDURE — 1159F PR MEDICATION LIST DOCUMENTED IN MEDICAL RECORD: ICD-10-PCS | Mod: S$GLB,,, | Performed by: FAMILY MEDICINE

## 2020-08-19 PROCEDURE — 1159F MED LIST DOCD IN RCRD: CPT | Mod: S$GLB,,, | Performed by: FAMILY MEDICINE

## 2020-08-19 PROCEDURE — 99214 PR OFFICE/OUTPT VISIT, EST, LEVL IV, 30-39 MIN: ICD-10-PCS | Mod: 25,S$GLB,, | Performed by: FAMILY MEDICINE

## 2020-08-19 PROCEDURE — 20611 DRAIN/INJ JOINT/BURSA W/US: CPT | Mod: RT,S$GLB,, | Performed by: FAMILY MEDICINE

## 2020-08-19 PROCEDURE — 73564 X-RAY EXAM KNEE 4 OR MORE: CPT | Mod: 26,50,, | Performed by: RADIOLOGY

## 2020-08-19 PROCEDURE — 99999 PR PBB SHADOW E&M-EST. PATIENT-LVL III: CPT | Mod: PBBFAC,,, | Performed by: FAMILY MEDICINE

## 2020-08-19 PROCEDURE — 99999 PR PBB SHADOW E&M-EST. PATIENT-LVL III: ICD-10-PCS | Mod: PBBFAC,,, | Performed by: FAMILY MEDICINE

## 2020-08-19 PROCEDURE — 73564 XR KNEE ORTHO BILAT WITH FLEXION: ICD-10-PCS | Mod: 26,50,, | Performed by: RADIOLOGY

## 2020-08-19 PROCEDURE — 3008F PR BODY MASS INDEX (BMI) DOCUMENTED: ICD-10-PCS | Mod: CPTII,S$GLB,, | Performed by: FAMILY MEDICINE

## 2020-08-19 PROCEDURE — 99214 OFFICE O/P EST MOD 30 MIN: CPT | Mod: 25,S$GLB,, | Performed by: FAMILY MEDICINE

## 2020-08-19 PROCEDURE — 1125F AMNT PAIN NOTED PAIN PRSNT: CPT | Mod: S$GLB,,, | Performed by: FAMILY MEDICINE

## 2020-08-19 PROCEDURE — 3008F BODY MASS INDEX DOCD: CPT | Mod: CPTII,S$GLB,, | Performed by: FAMILY MEDICINE

## 2020-08-19 PROCEDURE — 73564 X-RAY EXAM KNEE 4 OR MORE: CPT | Mod: TC,50

## 2020-08-19 PROCEDURE — 1125F PR PAIN SEVERITY QUANTIFIED, PAIN PRESENT: ICD-10-PCS | Mod: S$GLB,,, | Performed by: FAMILY MEDICINE

## 2020-08-19 RX ORDER — TRIAMCINOLONE ACETONIDE 40 MG/ML
40 INJECTION, SUSPENSION INTRA-ARTICULAR; INTRAMUSCULAR
Status: DISCONTINUED | OUTPATIENT
Start: 2020-08-19 | End: 2020-08-19 | Stop reason: HOSPADM

## 2020-08-19 RX ADMIN — TRIAMCINOLONE ACETONIDE 40 MG: 40 INJECTION, SUSPENSION INTRA-ARTICULAR; INTRAMUSCULAR at 11:08

## 2020-08-19 NOTE — PROCEDURES
"Large Joint Aspiration/Injection: R knee    Date/Time: 8/19/2020 11:30 AM  Performed by: Kendrick Freire MD  Authorized by: Kendrick Freire MD     Consent Done?:  Yes (Verbal)  Indications:  Pain  Site marked: the procedure site was marked    Timeout: prior to procedure the correct patient, procedure, and site was verified    Prep: patient was prepped and draped in usual sterile fashion    Local anesthesia used?: No      Details:  Needle Size:  20 G  Ultrasonic Guidance for needle placement?: Yes    Images are saved and documented.  Approach:  Lateral  Location:  Knee  Site:  R knee  Medications:  40 mg triamcinolone acetonide 40 mg/mL  Patient tolerance:  Patient tolerated the procedure well with no immediate complications     Description of ultrasound utilization for needle guidance:   Ultrasound guidance used for needle localization. Images saved and stored for documentation. The knee joint was visualized. Dynamic visualization of the 20g x 3.5" needle was continuous throughout the procedure.       "

## 2020-08-19 NOTE — PROGRESS NOTES
Sagrario Freire, a 70 y.o. female, presents today for evaluation of her LEFT and RIGHT knee.      History of Present Illness (HPI)  Location: medial/posterior knee, L>R  Onset: Chronic, >2 years  Palliative:    Relative rest   Oral analgesics   CSI, non [facility] about 2 years ago   CSI, L/R iaKnee, 18.11.07, 90% improvement   fPT, @[facility] - 19.01.15 - 19.02.12 , minimal Improvement   CSI, L. iaKnee, 04.22.2019, 60% Improvement for 4 weeks   CSI, L. iaKnee, 07.15.2019   VSI (Euflexxa), L. iaKnee, 08.19.2019, 25% Improvement   fPT, @[facility] date: 01.15.2019, duration: 7, minimal Improvement, d/c on 02.12.2019   CSI, L/R iaKnee, 11.18.2019, L. 100% Improvement for 12 weeks R. 100% continued improvement as of 1.27.2020   CSI, L/R iaKnee, 01.27.2020 80% Improvement for 8 weeks    csi, b. iaknee, 06.09.2020, R. 80% Improvement for 8 weeks L. 80% improvement for 2 weeks  Provocative:   ADLS   Prolonged ambulation  Prior: No hx of Orthopaedic surgery  Progression: worsening discomfort  Quality:    sharp pain   Muscle spasms at night  Radiation: none  Severity: per nursing documentation  Timing: intermittent w/ use  Trauma: none    Review of Systems (ROS)  A 10+ review of systems was performed with pertinent positives and negatives noted above in the history of present illness. Other systems were negative unless otherwise specified.    Physical Examination (PE)  General:  The patient is alert and oriented x 3. Mood is pleasant. Observation of ears, eyes and nose reveal no gross abnormalities. HEENT: NCAT, sclera anicteric.   Lungs: Respirations are equal and unlabored.  Gait is coordinated. Patient can toe walk and heel walk without difficulty.    KIEL KNEE EXAMINATION    Observation/Inspection  Gait:   Nonantalgic   Alignment:  Neutral   Scars:   None   Muscle atrophy: Mild  Effusion:  None   Warmth:  None   Discoloration:   none     Tenderness / Crepitus (T / C):         T / C      T / C  Patella   - / -   Lateral  joint line   - / -     Peripatellar medial  -  Medial joint line    ++ / -   Peripatellar lateral -  Medial plica   - / -  Patellar tendon -   Popliteal fossa   ++ / -   Quad tendon   -   Gastrocnemius   -  Prepatellar Bursa - / -   Quadricep   -  Tibial tubercle  -  Thigh/hamstring  -  Pes anserine/HS -  Fibula    -  ITB   - / -  Tibia     -  Tib/fib joint  - / -  LCL    -    MFC   - / -   MCL: Proximal  -    LFC   - / -   Distal    -          ROM: (* = pain)  PASSIVE   ACTIVE    Left :   5 / 0 / 145   5 / 0 / 145     Right :    5 / 0 / 145   5 / 0 / 145    Patellofemoral examination:  See above noted areas of tenderness.   Patella position    Subluxation / dislocation: Centered        Sup. / Inf;   Normal   Crepitus (PF):    Absent   Patellar Mobility:       Medial-lateral:   Normal    Superior-inferior:  Normal    Inferior tilt   Normal    Patellar tendon:  Normal   Lateral tilt:    Normal   J-sign:     None   Patellofemoral grind:   No pain     Meniscal Signs:     Pain on terminal extension:  +   Pain on terminal flexion:  +  Andressas maneuver:  +*  Squat     NT    Ligament Examination:  ACL / Lachman:  WNL  PCL-Post.  drawer: normal 0 to 2mm  MCL- Valgus:  normal 0 to 2mm  LCL- Varus:    normal 0 to 2mm  Pivot shift:  guarding   Dial Test:   difference c/w other side   At 30° flexion: normal (< 5°)    At 90° flexion: normal (< 5°)   Reverse Pivot Shift:   normal (Equal)     Strength: (* = with pain) Painful Side  Quadriceps   3+/5  Hamstring:   3+/5    Extremity Neuro-vascular Examination:   Sensation:  Grossly intact to light touch all dermatomal regions.   Motor Function:  Fully intact motor function at hip, knee, foot and ankle    DTRs;  quadriceps and  achilles 2+.  No clonus and downgoing Babinski.    Vascular status:  DP and PT pulses 2+, brisk capillary refill, symmetric.     Other Findings:    ASSESSMENT & PLAN  Assessment:   #1 Kellgren-Jeyson Grade III osteoarthritis of knee, edward med  compartment, bilat  W/ knee pain, left >> right    No evidence of neurologic pathology    Imaging studies reviewed:   X-ray knee, bilateral 20.08    Plan:    We discussed the importance of appropriate diet, weight, and regular exercise    We discussed options including    Watchful waiting / relative rest    Physical therapy x   Injection therapy csi iaknee RIGHT   Consultation tka wsc LEFT   The patient chooses As above   x = prescribed  CSI = corticosteroid injection  VSI = viscosupplement injection  PRPI = platelet rich plasma injection  ia = intra articular  R = right  L = left  B = bilateral   nfSx = surgical consultation was recommended, but patient is not interested in consultation at this time    Physical Therapy        Formal (fPT), @ [facility] facility p   Formal (fPT), @ [facility] facility        Homegoing (hgPT), per concurrent fPT recommendations    Homegoing (hgPT), per prior fPT recommendations t   Homegoing (hgPT), handout provided        w/  (atPT)    [blank] = not prescribed  x = prescribed  b = prescribed, and begin as indicated  t = continue as indicated  r = prescribed, and restart as indicated  p = completed prior as indicated  hs = prescribed, and with high school   col = prescribed, and with college or university   nfPT = physical therapy was recommended, but patient is not interested in PT at this time    Activity (e.g. sports, work) restrictions    [blank] = as tolerated  pt = per physical therapist  at = per     Bracing    [blank] = not prescribed  r = recommended, but not fit with at todays visit  f = prescribed and fit with at todays visit  t = continue as indicated  p = prn use on rare, as-needed basis; advised against chronic use    Pain management    [blank] = No prescription necessary. A handout detailing dosing of appropriate   over-the-counter musculoskeletal analgesics was made available to the patient.   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

## 2020-08-19 NOTE — PROGRESS NOTES
CC: Left knee pain    70 y.o. Female who presents as a new patient to me.  Referred for surgical consultation.  Possible total knee replacement.  Complaint is left knee pain x 2 years with an atraumatic onset. Pain localizes over the medial joint line and refers down the lateral thigh.  Sometimes anterior/retropatellar.  Denies swelling or effusions.  She does have some painful crepitus.  Denies instability.  Pain is daily and activity limiting.  Quality of life issue for her.  Worse with prolonged activity on feet.  She has some associated stiffness as well. Better with rest. Treatment thus far has included rest, activity modifications, multiple injections, physical therapy, and oral medication.  Pain can be severe at times upwards of 8-9/10. Here today to discuss diagnosis and treatment options.      She has had pain also in the right knee with known diagnosis of osteoarthritis.  The left knee is the predominant complaint at this time.    No ipsilateral hip pain.  No back or radicular symptoms.    PMHx notable for diabetes.   Negative for tobacco.   Positive for diabetes. Last A1C:8    Pain Score:   3    REVIEW OF SYSTEMS:   Constitution: Negative. Negative for chills, fever and night sweats.    Hematologic/Lymphatic: Negative for bleeding problem. Does not bruise/bleed easily.   Skin: Negative for dry skin, itching and rash.   Musculoskeletal: Negative for falls. Positive for left knee pain and muscle weakness.     All other review of symptoms were reviewed and found to be noncontributory.     PAST MEDICAL HISTORY:   Past Medical History:   Diagnosis Date    Diabetes     Hypercholesteremia     Hypertension     Hypothyroidism     Kidney stones      PAST SURGICAL HISTORY:   Past Surgical History:   Procedure Laterality Date    ABDOMINAL HERNIA REPAIR      BELT ABDOMINOPLASTY      CHOLECYSTECTOMY      COLECTOMY      partial      FAMILY HISTORY:   History reviewed. No pertinent family history.    SOCIAL  HISTORY:   Social History     Socioeconomic History    Marital status:      Spouse name: Not on file    Number of children: Not on file    Years of education: Not on file    Highest education level: Not on file   Occupational History    Not on file   Social Needs    Financial resource strain: Not on file    Food insecurity     Worry: Not on file     Inability: Not on file    Transportation needs     Medical: Not on file     Non-medical: Not on file   Tobacco Use    Smoking status: Never Smoker   Substance and Sexual Activity    Alcohol use: Not on file    Drug use: Not on file    Sexual activity: Not on file   Lifestyle    Physical activity     Days per week: Not on file     Minutes per session: Not on file    Stress: Not on file   Relationships    Social connections     Talks on phone: Not on file     Gets together: Not on file     Attends Shinto service: Not on file     Active member of club or organization: Not on file     Attends meetings of clubs or organizations: Not on file     Relationship status: Not on file   Other Topics Concern    Not on file   Social History Narrative    Not on file     MEDICATIONS:     Current Outpatient Medications:     ALPRAZolam (XANAX) 0.5 MG tablet, Take 0.5 mg by mouth every 12 (twelve) hours., Disp: , Rfl: 0    aspirin 81 MG Chew, Take 81 mg by mouth once daily., Disp: , Rfl:     atorvastatin (LIPITOR) 10 MG tablet, Take 10 mg by mouth once daily., Disp: , Rfl:     dulaglutide (TRULICITY) 1.5 mg/0.5 mL PnIj, Inject into the skin. Every Thursday, Disp: , Rfl:     famotidine (PEPCID) 20 MG tablet, Take 20 mg by mouth once., Disp: , Rfl:     glimepiride (AMARYL) 2 MG tablet, Take 2 mg by mouth every 12 (twelve) hours., Disp: , Rfl:     levothyroxine (SYNTHROID) 50 MCG tablet, Take 50 mcg by mouth once daily., Disp: , Rfl:     meloxicam (MOBIC) 7.5 MG tablet, TAKE 1 TABLET BY MOUTH EVERY DAY, Disp: 15 tablet, Rfl: 0    metFORMIN (GLUCOPHAGE) 1000  "MG tablet, Take 1,000 mg by mouth 2 (two) times daily with meals., Disp: , Rfl:     zolpidem (AMBIEN) 10 mg Tab, Take 10 mg by mouth once daily., Disp: , Rfl: 0  No current facility-administered medications for this visit.     ALLERGIES:   Review of patient's allergies indicates:  No Known Allergies     PHYSICAL EXAMINATION:  /71   Pulse 89   Ht 5' 6" (1.676 m)   Wt 65.8 kg (145 lb)   BMI 23.40 kg/m²   General: Well-developed well-nourished 70 y.o. femalein no acute distress   Cardiovascular: Regular rhythm by palpation of distal pulse, normal color and temperature, no concerning varicosities on symptomatic side   Lungs: No labored breathing or wheezing appreciated   Neuro: Alert and oriented ×3   Psychiatric: well oriented to person, place and time, demonstrates normal mood and affect   Skin: No rashes, lesions or ulcers, normal temperature, turgor, and texture on involved extremity    Ortho/SPM Exam  Examination of the left knee demonstrates intact extensor mechanism.  Chronic soft tissue swelling.  Trace effusion. Central patellar tracking with crepitus.  Positive patellar grind.  5° or less short of full extension passively.  Flexion to 120 with pain.  Prominent tenderness along the medial and lateral joint lines. Ligamentously stable.  No significant peripheral vascular disease.  2+ dorsalis pedis pulse.  Painless internal and external rotation of the hip.  No midline back tenderness.    IMAGING:  X-rays including standing, weight bearing AP and flexion bilateral knees, LEFT knee lateral and sunrise views as well as long leg standing views ordered and images reviewed by Dr. Matias and Dr. Hector show:  Tricompartmental DJD most prominent in the medial compartment with joint space narrowing     ASSESSMENT:      ICD-10-CM ICD-9-CM   1. Primary osteoarthritis of left knee  M17.12 715.16   2. Type 2 diabetes mellitus without complication, without long-term current use of insulin  E11.9 250.00       PLAN: "     Findings discussed with the patient.  The patient has significant daily activity limiting knee pain despite prior conservative treatment.  She would like to proceed with a knee replacement.  I think that is certainly reasonable.  The details of knee replacement surgery were discussed to include the expected postop rehab and recovery course.  The patient wished to proceed.  I have discussed the inherent risk for continued or recurrent pain postop and new since symptoms in upwards of 30% of patients, postoperative stiffness, infection, DVT/PE, and potential need for further surgery among others.  The patient verbalizes understanding.  She will need preoperative medical and dental clearances.    I did recheck her hemoglobin A1c level today and it was 8.1.  This does not meet the goal level preop and there is increased risk for postoperative poor wound healing and infection in this setting.  We will need to delay surgery until her serum glucose level is better controlled.  She will discuss this with her primary care physician and let us know when she is ready to proceed.    Plan would be for left total knee arthroplasty.    Informed Consent:    The details of the surgical procedure were explained, including the location of probable incisions and a description of possible hardware and/or grafts to be used. Alternatives to both operative and non-operative options with associated risks and benefits were discussed. The patient understands the likely convalescence after surgery and, in particular, the expected postop rehab and recovery course. The outlined risks and potential complications of the proposed procedure include but are not limited to: infection, poor wound healing, scarring, deformity, stiffness, swelling, continued or recurrent pain, instability, hardware or prosthetic failure if implanted, symptomatic hardware requiring removal, dislocation, weakness, neurovascular injury, numbness, chronic regional pain  disorder, tissue nonhealing/irreparability/retear, subsequent contralateral limb injury or pathology, chondral injury, arthritis, fracture, blood clot formation, inability to return to previous level of activity, anesthetic or regional block complication up to death, need for additional procedure as indicated intraoperatively, and potential need for further surgery.    The patient was also informed and understands that the risks of surgery are greater for patients with a current condition or history of heart disease, obesity, clotting disorders, recurrent infections, steroid use, current or past smoking, and factors such as sedentary lifestyle and noncompliance with medications, therapy or follow-up. The degree of the increased risk is hard to estimate with any degree of precision. If applicable, smoking cessation was discussed.     All questions were answered. The patient has verbalized understanding of these issues and wishes to proceed with the surgery as discussed.        Procedures

## 2020-08-20 ENCOUNTER — OFFICE VISIT (OUTPATIENT)
Dept: SPORTS MEDICINE | Facility: CLINIC | Age: 70
End: 2020-08-20
Payer: MEDICARE

## 2020-08-20 ENCOUNTER — HOSPITAL ENCOUNTER (OUTPATIENT)
Dept: RADIOLOGY | Facility: HOSPITAL | Age: 70
Discharge: HOME | End: 2020-08-20
Attending: STUDENT IN AN ORGANIZED HEALTH CARE EDUCATION/TRAINING PROGRAM
Payer: MEDICARE

## 2020-08-20 VITALS
WEIGHT: 145 LBS | HEIGHT: 66 IN | SYSTOLIC BLOOD PRESSURE: 121 MMHG | DIASTOLIC BLOOD PRESSURE: 71 MMHG | BODY MASS INDEX: 23.3 KG/M2 | HEART RATE: 89 BPM

## 2020-08-20 DIAGNOSIS — E11.9 TYPE 2 DIABETES MELLITUS WITHOUT COMPLICATION, WITHOUT LONG-TERM CURRENT USE OF INSULIN: ICD-10-CM

## 2020-08-20 DIAGNOSIS — M17.12 PRIMARY OSTEOARTHRITIS OF LEFT KNEE: Primary | ICD-10-CM

## 2020-08-20 DIAGNOSIS — M17.12 PRIMARY OSTEOARTHRITIS OF LEFT KNEE: ICD-10-CM

## 2020-08-20 PROCEDURE — 99214 PR OFFICE/OUTPT VISIT, EST, LEVL IV, 30-39 MIN: ICD-10-PCS | Mod: S$GLB,,, | Performed by: ORTHOPAEDIC SURGERY

## 2020-08-20 PROCEDURE — 3288F PR FALLS RISK ASSESSMENT DOCUMENTED: ICD-10-PCS | Mod: CPTII,S$GLB,, | Performed by: ORTHOPAEDIC SURGERY

## 2020-08-20 PROCEDURE — 77073 XR HIP TO ANKLE: ICD-10-PCS | Mod: 26,,, | Performed by: RADIOLOGY

## 2020-08-20 PROCEDURE — 3288F FALL RISK ASSESSMENT DOCD: CPT | Mod: CPTII,S$GLB,, | Performed by: ORTHOPAEDIC SURGERY

## 2020-08-20 PROCEDURE — 77073 BONE LENGTH STUDIES: CPT | Mod: TC

## 2020-08-20 PROCEDURE — 1125F AMNT PAIN NOTED PAIN PRSNT: CPT | Mod: S$GLB,,, | Performed by: ORTHOPAEDIC SURGERY

## 2020-08-20 PROCEDURE — 3052F PR MOST RECENT HEMOGLOBIN A1C LEVEL 8.0 - < 9.0%: ICD-10-PCS | Mod: CPTII,S$GLB,, | Performed by: ORTHOPAEDIC SURGERY

## 2020-08-20 PROCEDURE — 1125F PR PAIN SEVERITY QUANTIFIED, PAIN PRESENT: ICD-10-PCS | Mod: S$GLB,,, | Performed by: ORTHOPAEDIC SURGERY

## 2020-08-20 PROCEDURE — 99999 PR PBB SHADOW E&M-EST. PATIENT-LVL III: CPT | Mod: PBBFAC,,, | Performed by: ORTHOPAEDIC SURGERY

## 2020-08-20 PROCEDURE — 1100F PTFALLS ASSESS-DOCD GE2>/YR: CPT | Mod: CPTII,S$GLB,, | Performed by: ORTHOPAEDIC SURGERY

## 2020-08-20 PROCEDURE — 1159F PR MEDICATION LIST DOCUMENTED IN MEDICAL RECORD: ICD-10-PCS | Mod: S$GLB,,, | Performed by: ORTHOPAEDIC SURGERY

## 2020-08-20 PROCEDURE — 99214 OFFICE O/P EST MOD 30 MIN: CPT | Mod: S$GLB,,, | Performed by: ORTHOPAEDIC SURGERY

## 2020-08-20 PROCEDURE — 1159F MED LIST DOCD IN RCRD: CPT | Mod: S$GLB,,, | Performed by: ORTHOPAEDIC SURGERY

## 2020-08-20 PROCEDURE — 3008F PR BODY MASS INDEX (BMI) DOCUMENTED: ICD-10-PCS | Mod: CPTII,S$GLB,, | Performed by: ORTHOPAEDIC SURGERY

## 2020-08-20 PROCEDURE — 1100F PR PT FALLS ASSESS DOC 2+ FALLS/FALL W/INJURY/YR: ICD-10-PCS | Mod: CPTII,S$GLB,, | Performed by: ORTHOPAEDIC SURGERY

## 2020-08-20 PROCEDURE — 3008F BODY MASS INDEX DOCD: CPT | Mod: CPTII,S$GLB,, | Performed by: ORTHOPAEDIC SURGERY

## 2020-08-20 PROCEDURE — 77073 BONE LENGTH STUDIES: CPT | Mod: 26,,, | Performed by: RADIOLOGY

## 2020-08-20 PROCEDURE — 3052F HG A1C>EQUAL 8.0%<EQUAL 9.0%: CPT | Mod: CPTII,S$GLB,, | Performed by: ORTHOPAEDIC SURGERY

## 2020-08-20 PROCEDURE — 99999 PR PBB SHADOW E&M-EST. PATIENT-LVL III: ICD-10-PCS | Mod: PBBFAC,,, | Performed by: ORTHOPAEDIC SURGERY

## 2020-08-20 NOTE — LETTER
August 21, 2020      Kendrick Freire MD  [address]  [address]  [facility] [address]           [facility]  [address]  [facility] [address]  Phone: [phone]          Patient: Sagrario Freire   MR Number: [id number]   YOB: 1950   Date of Visit: 8/20/2020       Dear Dr. Kendrick Freire:    Thank you for referring Sagrario Freire to me for evaluation. Attached you will find relevant portions of my assessment and plan of care.    If you have questions, please do not hesitate to call me. I look forward to following Sagrario Freire along with you.    Sincerely,    SANTO Hector MD    Enclosure  CC:  No Recipients    If you would like to receive this communication electronically, please contact [email or web address] or [phone] to request more information on [facility] access.    For providers and/or their staff who would like to refer a patient to [facility], please contact us through our one-stop-shop provider referral line, [facility], at [phone].    If you feel you have received this communication in error or would no longer like to receive these types of communications, please e-mail [email or web address]

## 2021-01-06 DIAGNOSIS — M17.0 PRIMARY OSTEOARTHRITIS OF BOTH KNEES: Primary | ICD-10-CM

## 2021-01-07 RX ORDER — MELOXICAM 7.5 MG/1
7.5 TABLET ORAL DAILY
Qty: 15 TABLET | Refills: 0 | Status: SHIPPED | OUTPATIENT
Start: 2021-01-07 | End: 2021-01-25 | Stop reason: SDUPTHER

## 2021-01-27 ENCOUNTER — TELEPHONE (OUTPATIENT)
Dept: SPORTS MEDICINE | Facility: CLINIC | Age: 71
End: 2021-01-27

## 2021-04-05 ENCOUNTER — TELEPHONE (OUTPATIENT)
Dept: SPORTS MEDICINE | Facility: CLINIC | Age: 71
End: 2021-04-05

## 2021-04-05 ENCOUNTER — OFFICE VISIT (OUTPATIENT)
Dept: SPORTS MEDICINE | Facility: CLINIC | Age: 71
End: 2021-04-05
Payer: MEDICARE

## 2021-04-05 ENCOUNTER — HOSPITAL ENCOUNTER (OUTPATIENT)
Dept: RADIOLOGY | Facility: HOSPITAL | Age: 71
Discharge: HOME | End: 2021-04-05
Attending: ORTHOPAEDIC SURGERY
Payer: MEDICARE

## 2021-04-05 VITALS
WEIGHT: 150.69 LBS | SYSTOLIC BLOOD PRESSURE: 108 MMHG | BODY MASS INDEX: 24.22 KG/M2 | HEIGHT: 66 IN | HEART RATE: 99 BPM | DIASTOLIC BLOOD PRESSURE: 73 MMHG

## 2021-04-05 DIAGNOSIS — M17.12 PRIMARY OSTEOARTHRITIS OF LEFT KNEE: ICD-10-CM

## 2021-04-05 DIAGNOSIS — M17.12 PRIMARY OSTEOARTHRITIS OF LEFT KNEE: Primary | ICD-10-CM

## 2021-04-05 DIAGNOSIS — E11.9 TYPE 2 DIABETES MELLITUS WITHOUT COMPLICATION, WITHOUT LONG-TERM CURRENT USE OF INSULIN: ICD-10-CM

## 2021-04-05 DIAGNOSIS — Z01.818 PRE-OP TESTING: Primary | ICD-10-CM

## 2021-04-05 DIAGNOSIS — M17.11 PRIMARY OSTEOARTHRITIS OF RIGHT KNEE: ICD-10-CM

## 2021-04-05 LAB
CREAT SERPL-MCNC: 0.8 MG/DL (ref 0.5–1.4)
SAMPLE: NORMAL

## 2021-04-05 PROCEDURE — 99999 PR PBB SHADOW E&M-EST. PATIENT-LVL III: CPT | Mod: PBBFAC,,, | Performed by: ORTHOPAEDIC SURGERY

## 2021-04-05 PROCEDURE — 1159F PR MEDICATION LIST DOCUMENTED IN MEDICAL RECORD: ICD-10-PCS | Mod: S$GLB,,, | Performed by: ORTHOPAEDIC SURGERY

## 2021-04-05 PROCEDURE — 25500020 PHARM REV CODE 255: Performed by: ORTHOPAEDIC SURGERY

## 2021-04-05 PROCEDURE — 73720 MRI TIBIA FIBULA W WO CONTRAST LEFT: ICD-10-PCS | Mod: 26,LT,, | Performed by: RADIOLOGY

## 2021-04-05 PROCEDURE — 3288F FALL RISK ASSESSMENT DOCD: CPT | Mod: CPTII,S$GLB,, | Performed by: ORTHOPAEDIC SURGERY

## 2021-04-05 PROCEDURE — 1125F PR PAIN SEVERITY QUANTIFIED, PAIN PRESENT: ICD-10-PCS | Mod: S$GLB,,, | Performed by: ORTHOPAEDIC SURGERY

## 2021-04-05 PROCEDURE — 3288F PR FALLS RISK ASSESSMENT DOCUMENTED: ICD-10-PCS | Mod: CPTII,S$GLB,, | Performed by: ORTHOPAEDIC SURGERY

## 2021-04-05 PROCEDURE — 3052F HG A1C>EQUAL 8.0%<EQUAL 9.0%: CPT | Mod: CPTII,S$GLB,, | Performed by: ORTHOPAEDIC SURGERY

## 2021-04-05 PROCEDURE — 3008F BODY MASS INDEX DOCD: CPT | Mod: CPTII,S$GLB,, | Performed by: ORTHOPAEDIC SURGERY

## 2021-04-05 PROCEDURE — 99214 PR OFFICE/OUTPT VISIT, EST, LEVL IV, 30-39 MIN: ICD-10-PCS | Mod: S$GLB,,, | Performed by: ORTHOPAEDIC SURGERY

## 2021-04-05 PROCEDURE — 1101F PT FALLS ASSESS-DOCD LE1/YR: CPT | Mod: CPTII,S$GLB,, | Performed by: ORTHOPAEDIC SURGERY

## 2021-04-05 PROCEDURE — 73720 MRI LWR EXTREMITY W/O&W/DYE: CPT | Mod: TC,LT

## 2021-04-05 PROCEDURE — A9585 GADOBUTROL INJECTION: HCPCS | Performed by: ORTHOPAEDIC SURGERY

## 2021-04-05 PROCEDURE — 73720 MRI LWR EXTREMITY W/O&W/DYE: CPT | Mod: 26,LT,, | Performed by: RADIOLOGY

## 2021-04-05 PROCEDURE — 3052F PR MOST RECENT HEMOGLOBIN A1C LEVEL 8.0 - < 9.0%: ICD-10-PCS | Mod: CPTII,S$GLB,, | Performed by: ORTHOPAEDIC SURGERY

## 2021-04-05 PROCEDURE — 99214 OFFICE O/P EST MOD 30 MIN: CPT | Mod: S$GLB,,, | Performed by: ORTHOPAEDIC SURGERY

## 2021-04-05 PROCEDURE — 1159F MED LIST DOCD IN RCRD: CPT | Mod: S$GLB,,, | Performed by: ORTHOPAEDIC SURGERY

## 2021-04-05 PROCEDURE — 1101F PR PT FALLS ASSESS DOC 0-1 FALLS W/OUT INJ PAST YR: ICD-10-PCS | Mod: CPTII,S$GLB,, | Performed by: ORTHOPAEDIC SURGERY

## 2021-04-05 PROCEDURE — 99999 PR PBB SHADOW E&M-EST. PATIENT-LVL III: ICD-10-PCS | Mod: PBBFAC,,, | Performed by: ORTHOPAEDIC SURGERY

## 2021-04-05 PROCEDURE — 1125F AMNT PAIN NOTED PAIN PRSNT: CPT | Mod: S$GLB,,, | Performed by: ORTHOPAEDIC SURGERY

## 2021-04-05 PROCEDURE — 3008F PR BODY MASS INDEX (BMI) DOCUMENTED: ICD-10-PCS | Mod: CPTII,S$GLB,, | Performed by: ORTHOPAEDIC SURGERY

## 2021-04-05 RX ORDER — GADOBUTROL 604.72 MG/ML
7 INJECTION INTRAVENOUS
Status: COMPLETED | OUTPATIENT
Start: 2021-04-05 | End: 2021-04-05

## 2021-04-05 RX ADMIN — GADOBUTROL 7 ML: 604.72 INJECTION INTRAVENOUS at 04:04

## 2021-04-06 ENCOUNTER — OFFICE VISIT (OUTPATIENT)
Dept: SPORTS MEDICINE | Facility: CLINIC | Age: 71
End: 2021-04-06
Payer: MEDICARE

## 2021-04-06 VITALS
HEIGHT: 66 IN | DIASTOLIC BLOOD PRESSURE: 72 MMHG | SYSTOLIC BLOOD PRESSURE: 116 MMHG | BODY MASS INDEX: 24.11 KG/M2 | HEART RATE: 94 BPM | WEIGHT: 150 LBS

## 2021-04-06 DIAGNOSIS — Z01.818 PRE-OP TESTING: Primary | ICD-10-CM

## 2021-04-06 DIAGNOSIS — M79.609 PAIN IN EXTREMITY, UNSPECIFIED EXTREMITY: ICD-10-CM

## 2021-04-06 DIAGNOSIS — M17.12 PRIMARY OSTEOARTHRITIS OF LEFT KNEE: Primary | ICD-10-CM

## 2021-04-06 DIAGNOSIS — E03.9 HYPOTHYROIDISM, UNSPECIFIED TYPE: ICD-10-CM

## 2021-04-06 PROCEDURE — 99999 PR PBB SHADOW E&M-EST. PATIENT-LVL IV: ICD-10-PCS | Mod: PBBFAC,,, | Performed by: PHYSICIAN ASSISTANT

## 2021-04-06 PROCEDURE — 99499 NO LOS: ICD-10-PCS | Mod: S$GLB,,, | Performed by: PHYSICIAN ASSISTANT

## 2021-04-06 PROCEDURE — 1126F AMNT PAIN NOTED NONE PRSNT: CPT | Mod: S$GLB,,, | Performed by: PHYSICIAN ASSISTANT

## 2021-04-06 PROCEDURE — 1101F PT FALLS ASSESS-DOCD LE1/YR: CPT | Mod: CPTII,S$GLB,, | Performed by: PHYSICIAN ASSISTANT

## 2021-04-06 PROCEDURE — 1101F PR PT FALLS ASSESS DOC 0-1 FALLS W/OUT INJ PAST YR: ICD-10-PCS | Mod: CPTII,S$GLB,, | Performed by: PHYSICIAN ASSISTANT

## 2021-04-06 PROCEDURE — 99499 UNLISTED E&M SERVICE: CPT | Mod: S$GLB,,, | Performed by: PHYSICIAN ASSISTANT

## 2021-04-06 PROCEDURE — 3288F FALL RISK ASSESSMENT DOCD: CPT | Mod: CPTII,S$GLB,, | Performed by: PHYSICIAN ASSISTANT

## 2021-04-06 PROCEDURE — 1126F PR PAIN SEVERITY QUANTIFIED, NO PAIN PRESENT: ICD-10-PCS | Mod: S$GLB,,, | Performed by: PHYSICIAN ASSISTANT

## 2021-04-06 PROCEDURE — 99999 PR PBB SHADOW E&M-EST. PATIENT-LVL IV: CPT | Mod: PBBFAC,,, | Performed by: PHYSICIAN ASSISTANT

## 2021-04-06 PROCEDURE — 3008F PR BODY MASS INDEX (BMI) DOCUMENTED: ICD-10-PCS | Mod: CPTII,S$GLB,, | Performed by: PHYSICIAN ASSISTANT

## 2021-04-06 PROCEDURE — 3288F PR FALLS RISK ASSESSMENT DOCUMENTED: ICD-10-PCS | Mod: CPTII,S$GLB,, | Performed by: PHYSICIAN ASSISTANT

## 2021-04-06 PROCEDURE — 3008F BODY MASS INDEX DOCD: CPT | Mod: CPTII,S$GLB,, | Performed by: PHYSICIAN ASSISTANT

## 2021-04-06 RX ORDER — POLYETHYLENE GLYCOL 3350 17 G/17G
17 POWDER, FOR SOLUTION ORAL DAILY
Status: CANCELLED | OUTPATIENT
Start: 2021-04-06

## 2021-04-06 RX ORDER — CEFAZOLIN SODIUM 2 G/50ML
2 SOLUTION INTRAVENOUS
Status: CANCELLED | OUTPATIENT
Start: 2021-04-06 | End: 2021-04-07

## 2021-04-06 RX ORDER — AMOXICILLIN 250 MG
1 CAPSULE ORAL 2 TIMES DAILY
Status: CANCELLED | OUTPATIENT
Start: 2021-04-06

## 2021-04-06 RX ORDER — SODIUM CHLORIDE 9 MG/ML
INJECTION, SOLUTION INTRAVENOUS CONTINUOUS
Status: CANCELLED | OUTPATIENT
Start: 2021-04-06 | End: 2021-04-07

## 2021-04-06 RX ORDER — DEXTROMETHORPHAN HYDROBROMIDE, GUAIFENESIN 5; 100 MG/5ML; MG/5ML
650 LIQUID ORAL EVERY 8 HOURS
Qty: 42 TABLET | Refills: 0 | Status: SHIPPED | OUTPATIENT
Start: 2021-04-06 | End: 2021-04-29

## 2021-04-06 RX ORDER — TALC
6 POWDER (GRAM) TOPICAL NIGHTLY PRN
Status: CANCELLED | OUTPATIENT
Start: 2021-04-06

## 2021-04-06 RX ORDER — MORPHINE SULFATE 2 MG/ML
2 INJECTION, SOLUTION INTRAMUSCULAR; INTRAVENOUS
Status: CANCELLED | OUTPATIENT
Start: 2021-04-06

## 2021-04-06 RX ORDER — ROPIVACAINE HYDROCHLORIDE 2 MG/ML
8 INJECTION, SOLUTION EPIDURAL; INFILTRATION; PERINEURAL CONTINUOUS
Status: CANCELLED | OUTPATIENT
Start: 2021-04-06

## 2021-04-06 RX ORDER — MUPIROCIN 20 MG/G
1 OINTMENT TOPICAL 2 TIMES DAILY
Status: CANCELLED | OUTPATIENT
Start: 2021-04-06 | End: 2021-04-11

## 2021-04-06 RX ORDER — CEFAZOLIN SODIUM 2 G/50ML
2 SOLUTION INTRAVENOUS
Status: CANCELLED | OUTPATIENT
Start: 2021-04-06

## 2021-04-06 RX ORDER — MUPIROCIN 20 MG/G
1 OINTMENT TOPICAL
Status: CANCELLED | OUTPATIENT
Start: 2021-04-06

## 2021-04-06 RX ORDER — PREGABALIN 75 MG/1
75 CAPSULE ORAL NIGHTLY
Status: CANCELLED | OUTPATIENT
Start: 2021-04-06

## 2021-04-06 RX ORDER — CELECOXIB 200 MG/1
200 CAPSULE ORAL DAILY
Qty: 30 CAPSULE | Refills: 1 | Status: SHIPPED | OUTPATIENT
Start: 2021-04-06 | End: 2021-05-15

## 2021-04-06 RX ORDER — PREGABALIN 75 MG/1
75 CAPSULE ORAL
Status: CANCELLED | OUTPATIENT
Start: 2021-04-06

## 2021-04-06 RX ORDER — FENTANYL CITRATE 50 UG/ML
25 INJECTION, SOLUTION INTRAMUSCULAR; INTRAVENOUS EVERY 5 MIN PRN
Status: CANCELLED | OUTPATIENT
Start: 2021-04-06

## 2021-04-06 RX ORDER — PROCHLORPERAZINE EDISYLATE 5 MG/ML
5 INJECTION INTRAMUSCULAR; INTRAVENOUS EVERY 6 HOURS PRN
Status: CANCELLED | OUTPATIENT
Start: 2021-04-06

## 2021-04-06 RX ORDER — FAMOTIDINE 20 MG/1
20 TABLET, FILM COATED ORAL 2 TIMES DAILY
Status: CANCELLED | OUTPATIENT
Start: 2021-04-06

## 2021-04-06 RX ORDER — OXYCODONE HYDROCHLORIDE 5 MG/1
5 TABLET ORAL
Status: CANCELLED | OUTPATIENT
Start: 2021-04-06

## 2021-04-06 RX ORDER — ONDANSETRON 4 MG/1
4 TABLET, FILM COATED ORAL EVERY 8 HOURS PRN
Qty: 21 TABLET | Refills: 0 | Status: SHIPPED | OUTPATIENT
Start: 2021-04-06 | End: 2021-04-22

## 2021-04-06 RX ORDER — ACETAMINOPHEN 500 MG
1000 TABLET ORAL
Status: CANCELLED | OUTPATIENT
Start: 2021-04-06

## 2021-04-06 RX ORDER — CELECOXIB 200 MG/1
400 CAPSULE ORAL
Status: CANCELLED | OUTPATIENT
Start: 2021-04-06

## 2021-04-06 RX ORDER — SODIUM CHLORIDE 0.9 % (FLUSH) 0.9 %
10 SYRINGE (ML) INJECTION
Status: CANCELLED | OUTPATIENT
Start: 2021-04-06

## 2021-04-06 RX ORDER — ASPIRIN 81 MG/1
81 TABLET ORAL 2 TIMES DAILY
Status: CANCELLED | OUTPATIENT
Start: 2021-04-06

## 2021-04-06 RX ORDER — ACETAMINOPHEN 500 MG
1000 TABLET ORAL EVERY 6 HOURS
Status: CANCELLED | OUTPATIENT
Start: 2021-04-06 | End: 2021-04-08

## 2021-04-06 RX ORDER — OXYCODONE HYDROCHLORIDE 5 MG/1
10 TABLET ORAL
Status: CANCELLED | OUTPATIENT
Start: 2021-04-06

## 2021-04-06 RX ORDER — SODIUM CHLORIDE 9 MG/ML
INJECTION, SOLUTION INTRAVENOUS
Status: CANCELLED | OUTPATIENT
Start: 2021-04-06

## 2021-04-06 RX ORDER — MIDAZOLAM HYDROCHLORIDE 1 MG/ML
1 INJECTION INTRAMUSCULAR; INTRAVENOUS EVERY 5 MIN PRN
Status: CANCELLED | OUTPATIENT
Start: 2021-04-06

## 2021-04-06 RX ORDER — LIDOCAINE HYDROCHLORIDE 10 MG/ML
1 INJECTION, SOLUTION EPIDURAL; INFILTRATION; INTRACAUDAL; PERINEURAL
Status: CANCELLED | OUTPATIENT
Start: 2021-04-06

## 2021-04-06 RX ORDER — ASPIRIN 81 MG/1
81 TABLET ORAL 2 TIMES DAILY
Qty: 56 TABLET | Refills: 0 | Status: SHIPPED | OUTPATIENT
Start: 2021-04-06 | End: 2021-05-13

## 2021-04-06 RX ORDER — ONDANSETRON 2 MG/ML
4 INJECTION INTRAMUSCULAR; INTRAVENOUS EVERY 8 HOURS PRN
Status: CANCELLED | OUTPATIENT
Start: 2021-04-06

## 2021-04-06 RX ORDER — NALOXONE HCL 0.4 MG/ML
0.02 VIAL (ML) INJECTION
Status: CANCELLED | OUTPATIENT
Start: 2021-04-06

## 2021-04-06 RX ORDER — OXYCODONE HYDROCHLORIDE 5 MG/1
5 TABLET ORAL EVERY 6 HOURS PRN
Qty: 28 TABLET | Refills: 0 | Status: SHIPPED | OUTPATIENT
Start: 2021-04-06 | End: 2021-04-19 | Stop reason: SDUPTHER

## 2021-04-06 RX ORDER — BISACODYL 10 MG
10 SUPPOSITORY, RECTAL RECTAL EVERY 12 HOURS PRN
Status: CANCELLED | OUTPATIENT
Start: 2021-04-06

## 2021-04-06 RX ORDER — CELECOXIB 200 MG/1
200 CAPSULE ORAL DAILY
Status: CANCELLED | OUTPATIENT
Start: 2021-04-06

## 2021-04-07 ENCOUNTER — PATIENT MESSAGE (OUTPATIENT)
Dept: ADMINISTRATIVE | Facility: OTHER | Age: 71
End: 2021-04-07

## 2021-04-07 ENCOUNTER — PATIENT MESSAGE (OUTPATIENT)
Dept: SPORTS MEDICINE | Facility: CLINIC | Age: 71
End: 2021-04-07

## 2021-04-07 ENCOUNTER — TELEPHONE (OUTPATIENT)
Dept: PREADMISSION TESTING | Facility: HOSPITAL | Age: 71
End: 2021-04-07

## 2021-04-11 ENCOUNTER — LAB VISIT (OUTPATIENT)
Dept: SPORTS MEDICINE | Facility: CLINIC | Age: 71
DRG: 470 | End: 2021-04-11
Payer: MEDICARE

## 2021-04-11 DIAGNOSIS — Z01.818 PRE-OP TESTING: ICD-10-CM

## 2021-04-11 PROCEDURE — U0005 INFEC AGEN DETEC AMPLI PROBE: HCPCS | Performed by: ORTHOPAEDIC SURGERY

## 2021-04-11 PROCEDURE — U0003 INFECTIOUS AGENT DETECTION BY NUCLEIC ACID (DNA OR RNA); SEVERE ACUTE RESPIRATORY SYNDROME CORONAVIRUS 2 (SARS-COV-2) (CORONAVIRUS DISEASE [COVID-19]), AMPLIFIED PROBE TECHNIQUE, MAKING USE OF HIGH THROUGHPUT TECHNOLOGIES AS DESCRIBED BY CMS-2020-01-R: HCPCS | Performed by: ORTHOPAEDIC SURGERY

## 2021-04-12 ENCOUNTER — OFFICE VISIT (OUTPATIENT)
Dept: INTERNAL MEDICINE | Facility: CLINIC | Age: 71
DRG: 470 | End: 2021-04-12
Payer: MEDICARE

## 2021-04-12 ENCOUNTER — HOSPITAL ENCOUNTER (OUTPATIENT)
Dept: CARDIOLOGY | Facility: CLINIC | Age: 71
Discharge: HOME | DRG: 470 | End: 2021-04-12
Payer: MEDICARE

## 2021-04-12 VITALS
HEIGHT: 66 IN | DIASTOLIC BLOOD PRESSURE: 74 MMHG | OXYGEN SATURATION: 97 % | TEMPERATURE: 98 F | SYSTOLIC BLOOD PRESSURE: 127 MMHG | WEIGHT: 148 LBS | BODY MASS INDEX: 23.78 KG/M2 | HEART RATE: 90 BPM

## 2021-04-12 DIAGNOSIS — Z98.890 PONV (POSTOPERATIVE NAUSEA AND VOMITING): ICD-10-CM

## 2021-04-12 DIAGNOSIS — Z01.818 PRE-OP TESTING: ICD-10-CM

## 2021-04-12 DIAGNOSIS — R11.2 PONV (POSTOPERATIVE NAUSEA AND VOMITING): ICD-10-CM

## 2021-04-12 DIAGNOSIS — N18.31 STAGE 3A CHRONIC KIDNEY DISEASE: ICD-10-CM

## 2021-04-12 DIAGNOSIS — G47.33 OSA (OBSTRUCTIVE SLEEP APNEA): Primary | ICD-10-CM

## 2021-04-12 DIAGNOSIS — K21.9 GASTROESOPHAGEAL REFLUX DISEASE, UNSPECIFIED WHETHER ESOPHAGITIS PRESENT: ICD-10-CM

## 2021-04-12 DIAGNOSIS — E03.9 HYPOTHYROIDISM, UNSPECIFIED TYPE: ICD-10-CM

## 2021-04-12 DIAGNOSIS — R94.31 ABNORMAL EKG: ICD-10-CM

## 2021-04-12 LAB — SARS-COV-2 RNA RESP QL NAA+PROBE: NOT DETECTED

## 2021-04-12 PROCEDURE — 93010 EKG 12-LEAD: ICD-10-PCS | Mod: S$GLB,,, | Performed by: INTERNAL MEDICINE

## 2021-04-12 PROCEDURE — 99999 PR PBB SHADOW E&M-EST. PATIENT-LVL IV: ICD-10-PCS | Mod: PBBFAC,,,

## 2021-04-12 PROCEDURE — 1159F PR MEDICATION LIST DOCUMENTED IN MEDICAL RECORD: ICD-10-PCS | Mod: S$GLB,,, | Performed by: HOSPITALIST

## 2021-04-12 PROCEDURE — 1159F MED LIST DOCD IN RCRD: CPT | Mod: S$GLB,,, | Performed by: HOSPITALIST

## 2021-04-12 PROCEDURE — 93010 ELECTROCARDIOGRAM REPORT: CPT | Mod: S$GLB,,, | Performed by: INTERNAL MEDICINE

## 2021-04-12 PROCEDURE — 3008F BODY MASS INDEX DOCD: CPT | Mod: CPTII,S$GLB,, | Performed by: HOSPITALIST

## 2021-04-12 PROCEDURE — 99204 PR OFFICE/OUTPT VISIT, NEW, LEVL IV, 45-59 MIN: ICD-10-PCS | Mod: S$GLB,,, | Performed by: HOSPITALIST

## 2021-04-12 PROCEDURE — 93005 EKG 12-LEAD: ICD-10-PCS | Mod: S$GLB,,, | Performed by: ANESTHESIOLOGY

## 2021-04-12 PROCEDURE — 99999 PR PBB SHADOW E&M-EST. PATIENT-LVL IV: CPT | Mod: PBBFAC,,,

## 2021-04-12 PROCEDURE — 3008F PR BODY MASS INDEX (BMI) DOCUMENTED: ICD-10-PCS | Mod: CPTII,S$GLB,, | Performed by: HOSPITALIST

## 2021-04-12 PROCEDURE — 99204 OFFICE O/P NEW MOD 45 MIN: CPT | Mod: S$GLB,,, | Performed by: HOSPITALIST

## 2021-04-12 PROCEDURE — 93005 ELECTROCARDIOGRAM TRACING: CPT | Mod: S$GLB,,, | Performed by: ANESTHESIOLOGY

## 2021-04-13 ENCOUNTER — ANESTHESIA EVENT (OUTPATIENT)
Dept: SURGERY | Facility: HOSPITAL | Age: 71
DRG: 470 | End: 2021-04-13
Payer: MEDICARE

## 2021-04-13 ENCOUNTER — DOCUMENTATION ONLY (OUTPATIENT)
Dept: PREADMISSION TESTING | Facility: HOSPITAL | Age: 71
End: 2021-04-13

## 2021-04-13 ENCOUNTER — PATIENT MESSAGE (OUTPATIENT)
Dept: SURGERY | Facility: HOSPITAL | Age: 71
End: 2021-04-13

## 2021-04-13 ENCOUNTER — TELEPHONE (OUTPATIENT)
Dept: SPORTS MEDICINE | Facility: CLINIC | Age: 71
End: 2021-04-13

## 2021-04-13 ENCOUNTER — PATIENT MESSAGE (OUTPATIENT)
Dept: SPORTS MEDICINE | Facility: CLINIC | Age: 71
End: 2021-04-13

## 2021-04-13 PROBLEM — R94.31 ABNORMAL EKG: Status: ACTIVE | Noted: 2021-04-13

## 2021-04-14 ENCOUNTER — ANESTHESIA (OUTPATIENT)
Dept: SURGERY | Facility: HOSPITAL | Age: 71
DRG: 470 | End: 2021-04-14
Payer: MEDICARE

## 2021-04-14 ENCOUNTER — HOSPITAL ENCOUNTER (INPATIENT)
Facility: HOSPITAL | Age: 71
LOS: 1 days | Discharge: HOME | DRG: 470 | End: 2021-04-15
Attending: ORTHOPAEDIC SURGERY | Admitting: ORTHOPAEDIC SURGERY
Payer: MEDICARE

## 2021-04-14 DIAGNOSIS — M17.12 PRIMARY OSTEOARTHRITIS OF LEFT KNEE: ICD-10-CM

## 2021-04-14 LAB
POCT GLUCOSE: 140 MG/DL (ref 70–110)
POCT GLUCOSE: 196 MG/DL (ref 70–110)

## 2021-04-14 PROCEDURE — C1776 JOINT DEVICE (IMPLANTABLE): HCPCS | Performed by: ORTHOPAEDIC SURGERY

## 2021-04-14 PROCEDURE — 94799 UNLISTED PULMONARY SVC/PX: CPT

## 2021-04-14 PROCEDURE — D9220A PRA ANESTHESIA: ICD-10-PCS | Mod: ANES,,, | Performed by: ANESTHESIOLOGY

## 2021-04-14 PROCEDURE — 25000003 PHARM REV CODE 250: Performed by: PHYSICIAN ASSISTANT

## 2021-04-14 PROCEDURE — 82962 GLUCOSE BLOOD TEST: CPT | Performed by: ORTHOPAEDIC SURGERY

## 2021-04-14 PROCEDURE — 94761 N-INVAS EAR/PLS OXIMETRY MLT: CPT

## 2021-04-14 PROCEDURE — 63600175 PHARM REV CODE 636 W HCPCS: Performed by: ANESTHESIOLOGY

## 2021-04-14 PROCEDURE — 64448 NJX AA&/STRD FEM NRV NFS IMG: CPT | Performed by: ANESTHESIOLOGY

## 2021-04-14 PROCEDURE — 37000009 HC ANESTHESIA EA ADD 15 MINS: Performed by: ORTHOPAEDIC SURGERY

## 2021-04-14 PROCEDURE — 25000003 PHARM REV CODE 250: Performed by: STUDENT IN AN ORGANIZED HEALTH CARE EDUCATION/TRAINING PROGRAM

## 2021-04-14 PROCEDURE — 36000711: Performed by: ORTHOPAEDIC SURGERY

## 2021-04-14 PROCEDURE — 63600175 PHARM REV CODE 636 W HCPCS: Performed by: ORTHOPAEDIC SURGERY

## 2021-04-14 PROCEDURE — 76942 ADDUCTOR CANAL CATHETER: ICD-10-PCS | Mod: 26,,, | Performed by: ANESTHESIOLOGY

## 2021-04-14 PROCEDURE — C1751 CATH, INF, PER/CENT/MIDLINE: HCPCS | Performed by: ANESTHESIOLOGY

## 2021-04-14 PROCEDURE — D9220A PRA ANESTHESIA: ICD-10-PCS | Mod: CRNA,,, | Performed by: NURSE ANESTHETIST, CERTIFIED REGISTERED

## 2021-04-14 PROCEDURE — 36000710: Performed by: ORTHOPAEDIC SURGERY

## 2021-04-14 PROCEDURE — 27201423 OPTIME MED/SURG SUP & DEVICES STERILE SUPPLY: Performed by: ORTHOPAEDIC SURGERY

## 2021-04-14 PROCEDURE — D9220A PRA ANESTHESIA: Mod: CRNA,,, | Performed by: NURSE ANESTHETIST, CERTIFIED REGISTERED

## 2021-04-14 PROCEDURE — 27000221 HC OXYGEN, UP TO 24 HOURS

## 2021-04-14 PROCEDURE — 76942 ECHO GUIDE FOR BIOPSY: CPT | Mod: 26,,, | Performed by: ANESTHESIOLOGY

## 2021-04-14 PROCEDURE — 71000039 HC RECOVERY, EACH ADD'L HOUR: Performed by: ORTHOPAEDIC SURGERY

## 2021-04-14 PROCEDURE — 27100025 HC TUBING, SET FLUID WARMER: Performed by: ANESTHESIOLOGY

## 2021-04-14 PROCEDURE — 64448 ADDUCTOR CANAL CATHETER: ICD-10-PCS | Mod: 59,LT,, | Performed by: ANESTHESIOLOGY

## 2021-04-14 PROCEDURE — 63600175 PHARM REV CODE 636 W HCPCS: Performed by: PHYSICIAN ASSISTANT

## 2021-04-14 PROCEDURE — 63600175 PHARM REV CODE 636 W HCPCS: Performed by: NURSE ANESTHETIST, CERTIFIED REGISTERED

## 2021-04-14 PROCEDURE — 27447 PR TOTAL KNEE ARTHROPLASTY: ICD-10-PCS | Mod: LT,,, | Performed by: ORTHOPAEDIC SURGERY

## 2021-04-14 PROCEDURE — 71000033 HC RECOVERY, INTIAL HOUR: Performed by: ORTHOPAEDIC SURGERY

## 2021-04-14 PROCEDURE — 27447 TOTAL KNEE ARTHROPLASTY: CPT | Mod: LT,,, | Performed by: ORTHOPAEDIC SURGERY

## 2021-04-14 PROCEDURE — 25000003 PHARM REV CODE 250: Performed by: NURSE ANESTHETIST, CERTIFIED REGISTERED

## 2021-04-14 PROCEDURE — C1713 ANCHOR/SCREW BN/BN,TIS/BN: HCPCS | Performed by: ORTHOPAEDIC SURGERY

## 2021-04-14 PROCEDURE — 11000001 HC ACUTE MED/SURG PRIVATE ROOM

## 2021-04-14 PROCEDURE — 99900035 HC TECH TIME PER 15 MIN (STAT)

## 2021-04-14 PROCEDURE — 63600175 PHARM REV CODE 636 W HCPCS: Performed by: STUDENT IN AN ORGANIZED HEALTH CARE EDUCATION/TRAINING PROGRAM

## 2021-04-14 PROCEDURE — D9220A PRA ANESTHESIA: Mod: ANES,,, | Performed by: ANESTHESIOLOGY

## 2021-04-14 PROCEDURE — 37000008 HC ANESTHESIA 1ST 15 MINUTES: Performed by: ORTHOPAEDIC SURGERY

## 2021-04-14 PROCEDURE — 25000003 PHARM REV CODE 250: Performed by: ANESTHESIOLOGY

## 2021-04-14 DEVICE — CEMENT BONE SMPLX HV GENTMYCN: Type: IMPLANTABLE DEVICE | Site: KNEE | Status: FUNCTIONAL

## 2021-04-14 DEVICE — PATELLA TRIATHLON 33X9 SYMTRC: Type: IMPLANTABLE DEVICE | Site: KNEE | Status: FUNCTIONAL

## 2021-04-14 DEVICE — COMP FEM POST STAB CEM SZ 4 LT: Type: IMPLANTABLE DEVICE | Site: KNEE | Status: FUNCTIONAL

## 2021-04-14 DEVICE — INSERT TRIATHLON SZ4 11MM: Type: IMPLANTABLE DEVICE | Site: KNEE | Status: FUNCTIONAL

## 2021-04-14 DEVICE — BASEPLATE TIB CEM PRIM SZ 4: Type: IMPLANTABLE DEVICE | Site: KNEE | Status: FUNCTIONAL

## 2021-04-14 RX ORDER — POLYETHYLENE GLYCOL 3350 17 G/17G
17 POWDER, FOR SOLUTION ORAL DAILY
Status: DISCONTINUED | OUTPATIENT
Start: 2021-04-15 | End: 2021-04-15 | Stop reason: HOSPADM

## 2021-04-14 RX ORDER — HYDROMORPHONE HYDROCHLORIDE 1 MG/ML
0.5 INJECTION, SOLUTION INTRAMUSCULAR; INTRAVENOUS; SUBCUTANEOUS
Status: DISCONTINUED | OUTPATIENT
Start: 2021-04-14 | End: 2021-04-14 | Stop reason: HOSPADM

## 2021-04-14 RX ORDER — BISACODYL 10 MG
10 SUPPOSITORY, RECTAL RECTAL EVERY 12 HOURS PRN
Status: DISCONTINUED | OUTPATIENT
Start: 2021-04-14 | End: 2021-04-15 | Stop reason: HOSPADM

## 2021-04-14 RX ORDER — INSULIN ASPART 100 [IU]/ML
1-10 INJECTION, SOLUTION INTRAVENOUS; SUBCUTANEOUS
Status: DISCONTINUED | OUTPATIENT
Start: 2021-04-14 | End: 2021-04-15 | Stop reason: HOSPADM

## 2021-04-14 RX ORDER — LORAZEPAM 2 MG/ML
1.75 INJECTION INTRAMUSCULAR ONCE
Status: COMPLETED | OUTPATIENT
Start: 2021-04-14 | End: 2021-04-14

## 2021-04-14 RX ORDER — AMOXICILLIN 250 MG
1 CAPSULE ORAL 2 TIMES DAILY
Status: DISCONTINUED | OUTPATIENT
Start: 2021-04-14 | End: 2021-04-15 | Stop reason: HOSPADM

## 2021-04-14 RX ORDER — PROPOFOL 10 MG/ML
VIAL (ML) INTRAVENOUS
Status: DISCONTINUED | OUTPATIENT
Start: 2021-04-14 | End: 2021-04-14

## 2021-04-14 RX ORDER — LORAZEPAM 2 MG/ML
0.25 INJECTION INTRAMUSCULAR ONCE
Status: COMPLETED | OUTPATIENT
Start: 2021-04-14 | End: 2021-04-14

## 2021-04-14 RX ORDER — CEFAZOLIN SODIUM 1 G/3ML
2 INJECTION, POWDER, FOR SOLUTION INTRAMUSCULAR; INTRAVENOUS
Status: COMPLETED | OUTPATIENT
Start: 2021-04-15 | End: 2021-04-15

## 2021-04-14 RX ORDER — ACETAMINOPHEN 500 MG
1000 TABLET ORAL
Status: COMPLETED | OUTPATIENT
Start: 2021-04-14 | End: 2021-04-14

## 2021-04-14 RX ORDER — LIDOCAINE HYDROCHLORIDE 20 MG/ML
INJECTION INTRAVENOUS
Status: DISCONTINUED | OUTPATIENT
Start: 2021-04-14 | End: 2021-04-14

## 2021-04-14 RX ORDER — METHOCARBAMOL 500 MG/1
1000 TABLET, FILM COATED ORAL ONCE
Status: COMPLETED | OUTPATIENT
Start: 2021-04-14 | End: 2021-04-14

## 2021-04-14 RX ORDER — OXYCODONE HYDROCHLORIDE 5 MG/1
5 TABLET ORAL
Status: DISCONTINUED | OUTPATIENT
Start: 2021-04-14 | End: 2021-04-15 | Stop reason: HOSPADM

## 2021-04-14 RX ORDER — FAMOTIDINE 20 MG/1
20 TABLET, FILM COATED ORAL 2 TIMES DAILY
Status: DISCONTINUED | OUTPATIENT
Start: 2021-04-14 | End: 2021-04-15 | Stop reason: HOSPADM

## 2021-04-14 RX ORDER — ROPIVACAINE HYDROCHLORIDE 2 MG/ML
INJECTION, SOLUTION EPIDURAL; INFILTRATION; PERINEURAL CONTINUOUS
Status: DISPENSED | OUTPATIENT
Start: 2021-04-14

## 2021-04-14 RX ORDER — FENTANYL CITRATE 50 UG/ML
25 INJECTION, SOLUTION INTRAMUSCULAR; INTRAVENOUS EVERY 5 MIN PRN
Status: DISPENSED | OUTPATIENT
Start: 2021-04-14

## 2021-04-14 RX ORDER — ACETAMINOPHEN 500 MG
1000 TABLET ORAL
Status: DISCONTINUED | OUTPATIENT
Start: 2021-04-14 | End: 2021-04-14 | Stop reason: HOSPADM

## 2021-04-14 RX ORDER — ONDANSETRON 2 MG/ML
INJECTION INTRAMUSCULAR; INTRAVENOUS
Status: DISCONTINUED | OUTPATIENT
Start: 2021-04-14 | End: 2021-04-14

## 2021-04-14 RX ORDER — DEXAMETHASONE SODIUM PHOSPHATE 4 MG/ML
INJECTION, SOLUTION INTRA-ARTICULAR; INTRALESIONAL; INTRAMUSCULAR; INTRAVENOUS; SOFT TISSUE
Status: DISCONTINUED | OUTPATIENT
Start: 2021-04-14 | End: 2021-04-14

## 2021-04-14 RX ORDER — PREGABALIN 75 MG/1
75 CAPSULE ORAL NIGHTLY
Status: DISCONTINUED | OUTPATIENT
Start: 2021-04-14 | End: 2021-04-15 | Stop reason: HOSPADM

## 2021-04-14 RX ORDER — KETAMINE HCL IN 0.9 % NACL 50 MG/5 ML
SYRINGE (ML) INTRAVENOUS
Status: DISCONTINUED | OUTPATIENT
Start: 2021-04-14 | End: 2021-04-14

## 2021-04-14 RX ORDER — MIDAZOLAM HYDROCHLORIDE 1 MG/ML
0.5 INJECTION INTRAMUSCULAR; INTRAVENOUS
Status: DISPENSED | OUTPATIENT
Start: 2021-04-14

## 2021-04-14 RX ORDER — MUPIROCIN 20 MG/G
1 OINTMENT TOPICAL
Status: COMPLETED | OUTPATIENT
Start: 2021-04-14 | End: 2021-04-14

## 2021-04-14 RX ORDER — SODIUM CHLORIDE 0.9 % (FLUSH) 0.9 %
10 SYRINGE (ML) INJECTION
Status: DISCONTINUED | OUTPATIENT
Start: 2021-04-14 | End: 2021-04-14 | Stop reason: HOSPADM

## 2021-04-14 RX ORDER — TALC
6 POWDER (GRAM) TOPICAL NIGHTLY PRN
Status: DISCONTINUED | OUTPATIENT
Start: 2021-04-14 | End: 2021-04-14 | Stop reason: HOSPADM

## 2021-04-14 RX ORDER — MIDAZOLAM HYDROCHLORIDE 1 MG/ML
INJECTION, SOLUTION INTRAMUSCULAR; INTRAVENOUS
Status: DISCONTINUED | OUTPATIENT
Start: 2021-04-14 | End: 2021-04-14

## 2021-04-14 RX ORDER — SODIUM CHLORIDE 9 MG/ML
INJECTION, SOLUTION INTRAVENOUS
Status: COMPLETED | OUTPATIENT
Start: 2021-04-14 | End: 2021-04-14

## 2021-04-14 RX ORDER — OXYCODONE HYDROCHLORIDE 10 MG/1
10 TABLET ORAL
Status: DISCONTINUED | OUTPATIENT
Start: 2021-04-14 | End: 2021-04-15 | Stop reason: HOSPADM

## 2021-04-14 RX ORDER — SODIUM CHLORIDE 9 MG/ML
INJECTION, SOLUTION INTRAVENOUS CONTINUOUS
Status: DISCONTINUED | OUTPATIENT
Start: 2021-04-14 | End: 2021-04-15 | Stop reason: HOSPADM

## 2021-04-14 RX ORDER — IBUPROFEN 200 MG
24 TABLET ORAL
Status: DISCONTINUED | OUTPATIENT
Start: 2021-04-14 | End: 2021-04-15 | Stop reason: HOSPADM

## 2021-04-14 RX ORDER — ASPIRIN 81 MG/1
81 TABLET ORAL 2 TIMES DAILY
Status: DISCONTINUED | OUTPATIENT
Start: 2021-04-14 | End: 2021-04-15 | Stop reason: HOSPADM

## 2021-04-14 RX ORDER — CELECOXIB 200 MG/1
400 CAPSULE ORAL
Status: DISCONTINUED | OUTPATIENT
Start: 2021-04-14 | End: 2021-04-14

## 2021-04-14 RX ORDER — FENTANYL CITRATE 50 UG/ML
25 INJECTION, SOLUTION INTRAMUSCULAR; INTRAVENOUS EVERY 5 MIN PRN
Status: COMPLETED | OUTPATIENT
Start: 2021-04-14 | End: 2021-04-14

## 2021-04-14 RX ORDER — VANCOMYCIN HYDROCHLORIDE 1 G/20ML
INJECTION, POWDER, LYOPHILIZED, FOR SOLUTION INTRAVENOUS
Status: DISCONTINUED | OUTPATIENT
Start: 2021-04-14 | End: 2021-04-14 | Stop reason: HOSPADM

## 2021-04-14 RX ORDER — ONDANSETRON 2 MG/ML
4 INJECTION INTRAMUSCULAR; INTRAVENOUS EVERY 8 HOURS PRN
Status: DISCONTINUED | OUTPATIENT
Start: 2021-04-14 | End: 2021-04-15 | Stop reason: HOSPADM

## 2021-04-14 RX ORDER — FENTANYL CITRATE 50 UG/ML
25 INJECTION, SOLUTION INTRAMUSCULAR; INTRAVENOUS EVERY 5 MIN PRN
Status: DISCONTINUED | OUTPATIENT
Start: 2021-04-14 | End: 2021-04-14 | Stop reason: HOSPADM

## 2021-04-14 RX ORDER — MIDAZOLAM HYDROCHLORIDE 1 MG/ML
1 INJECTION INTRAMUSCULAR; INTRAVENOUS EVERY 5 MIN PRN
Status: DISCONTINUED | OUTPATIENT
Start: 2021-04-14 | End: 2021-04-14 | Stop reason: HOSPADM

## 2021-04-14 RX ORDER — PREGABALIN 75 MG/1
75 CAPSULE ORAL
Status: COMPLETED | OUTPATIENT
Start: 2021-04-14 | End: 2021-04-14

## 2021-04-14 RX ORDER — ACETAMINOPHEN 500 MG
1000 TABLET ORAL EVERY 6 HOURS
Status: DISCONTINUED | OUTPATIENT
Start: 2021-04-14 | End: 2021-04-15 | Stop reason: HOSPADM

## 2021-04-14 RX ORDER — FENTANYL CITRATE 50 UG/ML
25 INJECTION, SOLUTION INTRAMUSCULAR; INTRAVENOUS EVERY 5 MIN PRN
Status: DISCONTINUED | OUTPATIENT
Start: 2021-04-14 | End: 2021-04-14

## 2021-04-14 RX ORDER — VANCOMYCIN HCL IN 5 % DEXTROSE 1G/250ML
1000 PLASTIC BAG, INJECTION (ML) INTRAVENOUS
Status: COMPLETED | OUTPATIENT
Start: 2021-04-14 | End: 2021-04-14

## 2021-04-14 RX ORDER — SODIUM CHLORIDE 0.9 % (FLUSH) 0.9 %
3 SYRINGE (ML) INJECTION
Status: DISCONTINUED | OUTPATIENT
Start: 2021-04-14 | End: 2021-04-14 | Stop reason: HOSPADM

## 2021-04-14 RX ORDER — PROCHLORPERAZINE EDISYLATE 5 MG/ML
5 INJECTION INTRAMUSCULAR; INTRAVENOUS EVERY 6 HOURS PRN
Status: DISCONTINUED | OUTPATIENT
Start: 2021-04-14 | End: 2021-04-15 | Stop reason: HOSPADM

## 2021-04-14 RX ORDER — PROPOFOL 10 MG/ML
VIAL (ML) INTRAVENOUS CONTINUOUS PRN
Status: DISCONTINUED | OUTPATIENT
Start: 2021-04-14 | End: 2021-04-14

## 2021-04-14 RX ORDER — ROPIVACAINE HYDROCHLORIDE 2 MG/ML
INJECTION, SOLUTION EPIDURAL; INFILTRATION; PERINEURAL CONTINUOUS
Status: DISCONTINUED | OUTPATIENT
Start: 2021-04-14 | End: 2021-04-15 | Stop reason: HOSPADM

## 2021-04-14 RX ORDER — NALOXONE HCL 0.4 MG/ML
0.02 VIAL (ML) INJECTION
Status: DISCONTINUED | OUTPATIENT
Start: 2021-04-14 | End: 2021-04-15 | Stop reason: HOSPADM

## 2021-04-14 RX ORDER — MORPHINE SULFATE 2 MG/ML
2 INJECTION, SOLUTION INTRAMUSCULAR; INTRAVENOUS
Status: DISCONTINUED | OUTPATIENT
Start: 2021-04-14 | End: 2021-04-15 | Stop reason: HOSPADM

## 2021-04-14 RX ORDER — ROPIVACAINE HYDROCHLORIDE 5 MG/ML
INJECTION, SOLUTION EPIDURAL; INFILTRATION; PERINEURAL
Status: COMPLETED | OUTPATIENT
Start: 2021-04-14 | End: 2021-04-14

## 2021-04-14 RX ORDER — ATORVASTATIN CALCIUM 10 MG/1
10 TABLET, FILM COATED ORAL DAILY
Status: DISCONTINUED | OUTPATIENT
Start: 2021-04-15 | End: 2021-04-15 | Stop reason: HOSPADM

## 2021-04-14 RX ORDER — LEVOTHYROXINE SODIUM 50 UG/1
50 TABLET ORAL DAILY
Status: DISCONTINUED | OUTPATIENT
Start: 2021-04-15 | End: 2021-04-15 | Stop reason: HOSPADM

## 2021-04-14 RX ORDER — TRANEXAMIC ACID 100 MG/ML
1000 INJECTION, SOLUTION INTRAVENOUS
Status: COMPLETED | OUTPATIENT
Start: 2021-04-14 | End: 2021-04-14

## 2021-04-14 RX ORDER — MUPIROCIN 20 MG/G
1 OINTMENT TOPICAL 2 TIMES DAILY
Status: DISCONTINUED | OUTPATIENT
Start: 2021-04-14 | End: 2021-04-15 | Stop reason: HOSPADM

## 2021-04-14 RX ORDER — CEFAZOLIN SODIUM 1 G/3ML
2 INJECTION, POWDER, FOR SOLUTION INTRAMUSCULAR; INTRAVENOUS
Status: COMPLETED | OUTPATIENT
Start: 2021-04-14 | End: 2021-04-14

## 2021-04-14 RX ORDER — FAMOTIDINE 10 MG/ML
INJECTION INTRAVENOUS
Status: DISCONTINUED | OUTPATIENT
Start: 2021-04-14 | End: 2021-04-14

## 2021-04-14 RX ORDER — IBUPROFEN 200 MG
16 TABLET ORAL
Status: DISCONTINUED | OUTPATIENT
Start: 2021-04-14 | End: 2021-04-15 | Stop reason: HOSPADM

## 2021-04-14 RX ORDER — LIDOCAINE HYDROCHLORIDE 10 MG/ML
1 INJECTION, SOLUTION EPIDURAL; INFILTRATION; INTRACAUDAL; PERINEURAL
Status: DISCONTINUED | OUTPATIENT
Start: 2021-04-14 | End: 2021-04-14 | Stop reason: HOSPADM

## 2021-04-14 RX ORDER — GLUCAGON 1 MG
1 KIT INJECTION
Status: DISCONTINUED | OUTPATIENT
Start: 2021-04-14 | End: 2021-04-15 | Stop reason: HOSPADM

## 2021-04-14 RX ORDER — ROPIVACAINE HYDROCHLORIDE 2 MG/ML
8 INJECTION, SOLUTION EPIDURAL; INFILTRATION; PERINEURAL CONTINUOUS
Status: DISCONTINUED | OUTPATIENT
Start: 2021-04-14 | End: 2021-04-15 | Stop reason: HOSPADM

## 2021-04-14 RX ADMIN — TRANEXAMIC ACID 1000 MG: 100 INJECTION, SOLUTION INTRAVENOUS at 04:04

## 2021-04-14 RX ADMIN — OXYCODONE 5 MG: 5 TABLET ORAL at 07:04

## 2021-04-14 RX ADMIN — LIDOCAINE HYDROCHLORIDE 40 MG: 20 INJECTION, SOLUTION INTRAVENOUS at 04:04

## 2021-04-14 RX ADMIN — ONDANSETRON 4 MG: 2 INJECTION, SOLUTION INTRAMUSCULAR; INTRAVENOUS at 04:04

## 2021-04-14 RX ADMIN — LORAZEPAM 0.25 MG: 2 INJECTION INTRAMUSCULAR; INTRAVENOUS at 06:04

## 2021-04-14 RX ADMIN — FENTANYL CITRATE 50 MCG: 50 INJECTION, SOLUTION INTRAMUSCULAR; INTRAVENOUS at 01:04

## 2021-04-14 RX ADMIN — HYDROMORPHONE HYDROCHLORIDE 0.5 MG: 1 INJECTION, SOLUTION INTRAMUSCULAR; INTRAVENOUS; SUBCUTANEOUS at 07:04

## 2021-04-14 RX ADMIN — MEPIVACAINE HYDROCHLORIDE 3 ML: 15 INJECTION, SOLUTION EPIDURAL; INFILTRATION at 05:04

## 2021-04-14 RX ADMIN — Medication 10 MG: at 04:04

## 2021-04-14 RX ADMIN — ACETAMINOPHEN 1000 MG: 500 TABLET, FILM COATED ORAL at 06:04

## 2021-04-14 RX ADMIN — ASPIRIN 81 MG: 81 TABLET, COATED ORAL at 08:04

## 2021-04-14 RX ADMIN — ACETAMINOPHEN 1000 MG: 500 TABLET, FILM COATED ORAL at 12:04

## 2021-04-14 RX ADMIN — CEFAZOLIN 2 G: 330 INJECTION, POWDER, FOR SOLUTION INTRAMUSCULAR; INTRAVENOUS at 04:04

## 2021-04-14 RX ADMIN — MIDAZOLAM HYDROCHLORIDE 2 MG: 1 INJECTION, SOLUTION INTRAMUSCULAR; INTRAVENOUS at 03:04

## 2021-04-14 RX ADMIN — LIDOCAINE HYDROCHLORIDE 60 MG: 20 INJECTION, SOLUTION INTRAVENOUS at 03:04

## 2021-04-14 RX ADMIN — TRANEXAMIC ACID 1000 MG: 100 INJECTION, SOLUTION INTRAVENOUS at 05:04

## 2021-04-14 RX ADMIN — Medication: at 06:04

## 2021-04-14 RX ADMIN — OXYCODONE 5 MG: 5 TABLET ORAL at 06:04

## 2021-04-14 RX ADMIN — PROPOFOL 30 MG: 10 INJECTION, EMULSION INTRAVENOUS at 03:04

## 2021-04-14 RX ADMIN — FENTANYL CITRATE 25 MCG: 50 INJECTION, SOLUTION INTRAMUSCULAR; INTRAVENOUS at 06:04

## 2021-04-14 RX ADMIN — SODIUM CHLORIDE: 0.9 INJECTION, SOLUTION INTRAVENOUS at 01:04

## 2021-04-14 RX ADMIN — MORPHINE SULFATE 2 MG: 2 INJECTION, SOLUTION INTRAMUSCULAR; INTRAVENOUS at 07:04

## 2021-04-14 RX ADMIN — PROPOFOL 50 MCG/KG/MIN: 10 INJECTION, EMULSION INTRAVENOUS at 03:04

## 2021-04-14 RX ADMIN — ROPIVACAINE HYDROCHLORIDE 7.5 ML: 5 INJECTION, SOLUTION EPIDURAL; INFILTRATION; PERINEURAL at 01:04

## 2021-04-14 RX ADMIN — VANCOMYCIN HYDROCHLORIDE 1000 MG: 1 INJECTION, POWDER, LYOPHILIZED, FOR SOLUTION INTRAVENOUS at 01:04

## 2021-04-14 RX ADMIN — INSULIN ASPART 1 UNITS: 100 INJECTION, SOLUTION INTRAVENOUS; SUBCUTANEOUS at 08:04

## 2021-04-14 RX ADMIN — DOCUSATE SODIUM 50MG AND SENNOSIDES 8.6MG 1 TABLET: 8.6; 5 TABLET, FILM COATED ORAL at 08:04

## 2021-04-14 RX ADMIN — MUPIROCIN 1 G: 20 OINTMENT TOPICAL at 12:04

## 2021-04-14 RX ADMIN — SODIUM CHLORIDE: 0.9 INJECTION, SOLUTION INTRAVENOUS at 08:04

## 2021-04-14 RX ADMIN — METHOCARBAMOL 1000 MG: 500 TABLET ORAL at 07:04

## 2021-04-14 RX ADMIN — DEXAMETHASONE SODIUM PHOSPHATE 8 MG: 4 INJECTION, SOLUTION INTRAMUSCULAR; INTRAVENOUS at 04:04

## 2021-04-14 RX ADMIN — LORAZEPAM 1.75 MG: 2 INJECTION INTRAMUSCULAR; INTRAVENOUS at 06:04

## 2021-04-14 RX ADMIN — MUPIROCIN 1 G: 20 OINTMENT TOPICAL at 08:04

## 2021-04-14 RX ADMIN — PREGABALIN 75 MG: 75 CAPSULE ORAL at 12:04

## 2021-04-14 RX ADMIN — FAMOTIDINE 20 MG: 20 TABLET, FILM COATED ORAL at 08:04

## 2021-04-14 RX ADMIN — MIDAZOLAM 2 MG: 1 INJECTION INTRAMUSCULAR; INTRAVENOUS at 01:04

## 2021-04-14 RX ADMIN — FAMOTIDINE 20 MG: 10 INJECTION, SOLUTION INTRAVENOUS at 04:04

## 2021-04-15 VITALS
OXYGEN SATURATION: 94 % | HEART RATE: 77 BPM | RESPIRATION RATE: 16 BRPM | SYSTOLIC BLOOD PRESSURE: 102 MMHG | TEMPERATURE: 97 F | WEIGHT: 148 LBS | DIASTOLIC BLOOD PRESSURE: 59 MMHG | BODY MASS INDEX: 23.78 KG/M2 | HEIGHT: 66 IN

## 2021-04-15 LAB
POCT GLUCOSE: 116 MG/DL (ref 70–110)
POCT GLUCOSE: 177 MG/DL (ref 70–110)

## 2021-04-15 PROCEDURE — 97110 THERAPEUTIC EXERCISES: CPT

## 2021-04-15 PROCEDURE — 97165 OT EVAL LOW COMPLEX 30 MIN: CPT

## 2021-04-15 PROCEDURE — 94761 N-INVAS EAR/PLS OXIMETRY MLT: CPT

## 2021-04-15 PROCEDURE — 25000003 PHARM REV CODE 250: Performed by: ANESTHESIOLOGY

## 2021-04-15 PROCEDURE — 25000003 PHARM REV CODE 250: Performed by: PHYSICIAN ASSISTANT

## 2021-04-15 PROCEDURE — 99231 PR SUBSEQUENT HOSPITAL CARE,LEVL I: ICD-10-PCS | Mod: ,,, | Performed by: PHYSICIAN ASSISTANT

## 2021-04-15 PROCEDURE — 97530 THERAPEUTIC ACTIVITIES: CPT

## 2021-04-15 PROCEDURE — 97161 PT EVAL LOW COMPLEX 20 MIN: CPT

## 2021-04-15 PROCEDURE — 97535 SELF CARE MNGMENT TRAINING: CPT

## 2021-04-15 PROCEDURE — 99900035 HC TECH TIME PER 15 MIN (STAT)

## 2021-04-15 PROCEDURE — 63600175 PHARM REV CODE 636 W HCPCS: Performed by: PHYSICIAN ASSISTANT

## 2021-04-15 PROCEDURE — 27000221 HC OXYGEN, UP TO 24 HOURS

## 2021-04-15 PROCEDURE — 99231 SBSQ HOSP IP/OBS SF/LOW 25: CPT | Mod: ,,, | Performed by: PHYSICIAN ASSISTANT

## 2021-04-15 PROCEDURE — 97116 GAIT TRAINING THERAPY: CPT

## 2021-04-15 RX ORDER — CELECOXIB 200 MG/1
200 CAPSULE ORAL DAILY
Status: DISCONTINUED | OUTPATIENT
Start: 2021-04-15 | End: 2021-04-15 | Stop reason: HOSPADM

## 2021-04-15 RX ADMIN — DOCUSATE SODIUM 50MG AND SENNOSIDES 8.6MG 1 TABLET: 8.6; 5 TABLET, FILM COATED ORAL at 08:04

## 2021-04-15 RX ADMIN — ACETAMINOPHEN 1000 MG: 500 TABLET, FILM COATED ORAL at 12:04

## 2021-04-15 RX ADMIN — FAMOTIDINE 20 MG: 20 TABLET, FILM COATED ORAL at 08:04

## 2021-04-15 RX ADMIN — ASPIRIN 81 MG: 81 TABLET, COATED ORAL at 08:04

## 2021-04-15 RX ADMIN — LEVOTHYROXINE SODIUM 50 MCG: 50 TABLET ORAL at 08:04

## 2021-04-15 RX ADMIN — ATORVASTATIN CALCIUM 10 MG: 10 TABLET, FILM COATED ORAL at 08:04

## 2021-04-15 RX ADMIN — ACETAMINOPHEN 1000 MG: 500 TABLET, FILM COATED ORAL at 06:04

## 2021-04-15 RX ADMIN — OXYCODONE 5 MG: 5 TABLET ORAL at 09:04

## 2021-04-15 RX ADMIN — CEFAZOLIN 2 G: 330 INJECTION, POWDER, FOR SOLUTION INTRAMUSCULAR; INTRAVENOUS at 12:04

## 2021-04-15 RX ADMIN — MUPIROCIN 1 G: 20 OINTMENT TOPICAL at 08:04

## 2021-04-15 RX ADMIN — CEFAZOLIN 2 G: 330 INJECTION, POWDER, FOR SOLUTION INTRAMUSCULAR; INTRAVENOUS at 08:04

## 2021-04-15 RX ADMIN — OXYCODONE 5 MG: 5 TABLET ORAL at 06:04

## 2021-04-15 RX ADMIN — INSULIN ASPART 2 UNITS: 100 INJECTION, SOLUTION INTRAVENOUS; SUBCUTANEOUS at 06:04

## 2021-04-16 ENCOUNTER — CLINICAL SUPPORT (OUTPATIENT)
Dept: REHABILITATION | Facility: HOSPITAL | Age: 71
End: 2021-04-16
Payer: MEDICARE

## 2021-04-16 DIAGNOSIS — M25.562 CHRONIC PAIN OF LEFT KNEE: ICD-10-CM

## 2021-04-16 DIAGNOSIS — G89.29 CHRONIC PAIN OF LEFT KNEE: ICD-10-CM

## 2021-04-16 DIAGNOSIS — M25.662 DECREASED RANGE OF MOTION (ROM) OF LEFT KNEE: ICD-10-CM

## 2021-04-16 DIAGNOSIS — M17.12 PRIMARY OSTEOARTHRITIS OF LEFT KNEE: ICD-10-CM

## 2021-04-16 DIAGNOSIS — R26.89 IMPAIRED GAIT AND MOBILITY: ICD-10-CM

## 2021-04-16 PROCEDURE — 97110 THERAPEUTIC EXERCISES: CPT

## 2021-04-16 PROCEDURE — 97161 PT EVAL LOW COMPLEX 20 MIN: CPT

## 2021-04-18 DIAGNOSIS — M17.12 PRIMARY OSTEOARTHRITIS OF LEFT KNEE: ICD-10-CM

## 2021-04-19 ENCOUNTER — TELEPHONE (OUTPATIENT)
Dept: SPORTS MEDICINE | Facility: CLINIC | Age: 71
End: 2021-04-19

## 2021-04-19 ENCOUNTER — CLINICAL SUPPORT (OUTPATIENT)
Dept: REHABILITATION | Facility: HOSPITAL | Age: 71
End: 2021-04-19
Payer: MEDICARE

## 2021-04-19 DIAGNOSIS — G89.29 CHRONIC PAIN OF LEFT KNEE: ICD-10-CM

## 2021-04-19 DIAGNOSIS — R26.89 IMPAIRED GAIT AND MOBILITY: ICD-10-CM

## 2021-04-19 DIAGNOSIS — M25.662 DECREASED RANGE OF MOTION (ROM) OF LEFT KNEE: ICD-10-CM

## 2021-04-19 DIAGNOSIS — Z96.652 S/P TKR (TOTAL KNEE REPLACEMENT) USING CEMENT, LEFT: Primary | ICD-10-CM

## 2021-04-19 DIAGNOSIS — M25.562 CHRONIC PAIN OF LEFT KNEE: ICD-10-CM

## 2021-04-19 PROCEDURE — 97110 THERAPEUTIC EXERCISES: CPT | Mod: CQ

## 2021-04-19 RX ORDER — OXYCODONE HYDROCHLORIDE 5 MG/1
5 TABLET ORAL EVERY 8 HOURS PRN
Qty: 21 TABLET | Refills: 0 | Status: CANCELLED | OUTPATIENT
Start: 2021-04-20 | End: 2021-04-27

## 2021-04-19 RX ORDER — OXYCODONE HYDROCHLORIDE 5 MG/1
5 TABLET ORAL EVERY 8 HOURS PRN
Qty: 21 TABLET | Refills: 0 | Status: SHIPPED | OUTPATIENT
Start: 2021-04-19 | End: 2021-04-26

## 2021-04-20 ENCOUNTER — PATIENT MESSAGE (OUTPATIENT)
Dept: SPORTS MEDICINE | Facility: CLINIC | Age: 71
End: 2021-04-20

## 2021-04-21 ENCOUNTER — CLINICAL SUPPORT (OUTPATIENT)
Dept: REHABILITATION | Facility: HOSPITAL | Age: 71
End: 2021-04-21
Payer: MEDICARE

## 2021-04-21 DIAGNOSIS — M25.662 DECREASED RANGE OF MOTION (ROM) OF LEFT KNEE: ICD-10-CM

## 2021-04-21 DIAGNOSIS — G89.29 CHRONIC PAIN OF LEFT KNEE: ICD-10-CM

## 2021-04-21 DIAGNOSIS — R26.89 IMPAIRED GAIT AND MOBILITY: ICD-10-CM

## 2021-04-21 DIAGNOSIS — M25.562 CHRONIC PAIN OF LEFT KNEE: ICD-10-CM

## 2021-04-21 PROCEDURE — 97110 THERAPEUTIC EXERCISES: CPT | Mod: CQ

## 2021-04-22 ENCOUNTER — PATIENT MESSAGE (OUTPATIENT)
Dept: SPORTS MEDICINE | Facility: CLINIC | Age: 71
End: 2021-04-22

## 2021-04-22 DIAGNOSIS — Z96.652 S/P TKR (TOTAL KNEE REPLACEMENT) USING CEMENT, LEFT: Primary | ICD-10-CM

## 2021-04-22 RX ORDER — PREGABALIN 75 MG/1
75 CAPSULE ORAL 2 TIMES DAILY
Qty: 14 CAPSULE | Refills: 0 | Status: SHIPPED | OUTPATIENT
Start: 2021-04-22 | End: 2021-04-29 | Stop reason: SDUPTHER

## 2021-04-23 ENCOUNTER — CLINICAL SUPPORT (OUTPATIENT)
Dept: REHABILITATION | Facility: HOSPITAL | Age: 71
End: 2021-04-23
Payer: MEDICARE

## 2021-04-23 DIAGNOSIS — G89.29 CHRONIC PAIN OF LEFT KNEE: ICD-10-CM

## 2021-04-23 DIAGNOSIS — M25.562 CHRONIC PAIN OF LEFT KNEE: ICD-10-CM

## 2021-04-23 DIAGNOSIS — M25.662 DECREASED RANGE OF MOTION (ROM) OF LEFT KNEE: ICD-10-CM

## 2021-04-23 DIAGNOSIS — R26.89 IMPAIRED GAIT AND MOBILITY: ICD-10-CM

## 2021-04-23 PROCEDURE — 97110 THERAPEUTIC EXERCISES: CPT | Mod: CQ

## 2021-04-26 ENCOUNTER — CLINICAL SUPPORT (OUTPATIENT)
Dept: REHABILITATION | Facility: HOSPITAL | Age: 71
End: 2021-04-26
Payer: MEDICARE

## 2021-04-26 DIAGNOSIS — M25.562 CHRONIC PAIN OF LEFT KNEE: ICD-10-CM

## 2021-04-26 DIAGNOSIS — M25.662 DECREASED RANGE OF MOTION (ROM) OF LEFT KNEE: ICD-10-CM

## 2021-04-26 DIAGNOSIS — G89.29 CHRONIC PAIN OF LEFT KNEE: ICD-10-CM

## 2021-04-26 DIAGNOSIS — R26.89 IMPAIRED GAIT AND MOBILITY: ICD-10-CM

## 2021-04-26 PROBLEM — R26.2 DIFFICULTY WALKING: Status: RESOLVED | Noted: 2019-01-15 | Resolved: 2021-04-26

## 2021-04-26 PROBLEM — R53.1 WEAKNESS: Status: RESOLVED | Noted: 2019-01-15 | Resolved: 2021-04-26

## 2021-04-26 PROBLEM — M25.561 BILATERAL CHRONIC KNEE PAIN: Status: RESOLVED | Noted: 2019-01-15 | Resolved: 2021-04-26

## 2021-04-26 PROBLEM — M25.60 RANGE OF MOTION DEFICIT: Status: RESOLVED | Noted: 2019-01-15 | Resolved: 2021-04-26

## 2021-04-26 PROCEDURE — 97110 THERAPEUTIC EXERCISES: CPT

## 2021-04-28 ENCOUNTER — HOSPITAL ENCOUNTER (OUTPATIENT)
Dept: RADIOLOGY | Facility: HOSPITAL | Age: 71
Discharge: HOME | End: 2021-04-28
Attending: PHYSICIAN ASSISTANT
Payer: MEDICARE

## 2021-04-28 ENCOUNTER — OFFICE VISIT (OUTPATIENT)
Dept: SPORTS MEDICINE | Facility: CLINIC | Age: 71
End: 2021-04-28
Payer: MEDICARE

## 2021-04-28 ENCOUNTER — CLINICAL SUPPORT (OUTPATIENT)
Dept: REHABILITATION | Facility: HOSPITAL | Age: 71
End: 2021-04-28
Payer: MEDICARE

## 2021-04-28 VITALS
WEIGHT: 148 LBS | DIASTOLIC BLOOD PRESSURE: 75 MMHG | BODY MASS INDEX: 23.78 KG/M2 | HEART RATE: 98 BPM | HEIGHT: 66 IN | SYSTOLIC BLOOD PRESSURE: 121 MMHG

## 2021-04-28 DIAGNOSIS — M25.562 CHRONIC PAIN OF LEFT KNEE: ICD-10-CM

## 2021-04-28 DIAGNOSIS — M25.562 LEFT KNEE PAIN, UNSPECIFIED CHRONICITY: ICD-10-CM

## 2021-04-28 DIAGNOSIS — Z96.652 S/P TKR (TOTAL KNEE REPLACEMENT) USING CEMENT, LEFT: ICD-10-CM

## 2021-04-28 DIAGNOSIS — R26.89 IMPAIRED GAIT AND MOBILITY: ICD-10-CM

## 2021-04-28 DIAGNOSIS — Z96.652 S/P TKR (TOTAL KNEE REPLACEMENT) USING CEMENT, LEFT: Primary | ICD-10-CM

## 2021-04-28 DIAGNOSIS — G89.29 CHRONIC PAIN OF LEFT KNEE: ICD-10-CM

## 2021-04-28 DIAGNOSIS — M25.662 DECREASED RANGE OF MOTION (ROM) OF LEFT KNEE: ICD-10-CM

## 2021-04-28 PROCEDURE — 1125F PR PAIN SEVERITY QUANTIFIED, PAIN PRESENT: ICD-10-PCS | Mod: S$GLB,,, | Performed by: PHYSICIAN ASSISTANT

## 2021-04-28 PROCEDURE — 3008F BODY MASS INDEX DOCD: CPT | Mod: CPTII,S$GLB,, | Performed by: PHYSICIAN ASSISTANT

## 2021-04-28 PROCEDURE — 1101F PT FALLS ASSESS-DOCD LE1/YR: CPT | Mod: CPTII,S$GLB,, | Performed by: PHYSICIAN ASSISTANT

## 2021-04-28 PROCEDURE — 1101F PR PT FALLS ASSESS DOC 0-1 FALLS W/OUT INJ PAST YR: ICD-10-PCS | Mod: CPTII,S$GLB,, | Performed by: PHYSICIAN ASSISTANT

## 2021-04-28 PROCEDURE — 99024 PR POST-OP FOLLOW-UP VISIT: ICD-10-PCS | Mod: S$GLB,,, | Performed by: PHYSICIAN ASSISTANT

## 2021-04-28 PROCEDURE — 99024 POSTOP FOLLOW-UP VISIT: CPT | Mod: S$GLB,,, | Performed by: PHYSICIAN ASSISTANT

## 2021-04-28 PROCEDURE — 73562 XR KNEE ORTHO LEFT WITH FLEXION: ICD-10-PCS | Mod: 26,RT,, | Performed by: RADIOLOGY

## 2021-04-28 PROCEDURE — 3288F PR FALLS RISK ASSESSMENT DOCUMENTED: ICD-10-PCS | Mod: CPTII,S$GLB,, | Performed by: PHYSICIAN ASSISTANT

## 2021-04-28 PROCEDURE — 73562 X-RAY EXAM OF KNEE 3: CPT | Mod: 26,RT,, | Performed by: RADIOLOGY

## 2021-04-28 PROCEDURE — 73560 X-RAY EXAM OF KNEE 1 OR 2: CPT | Mod: TC,LT,59

## 2021-04-28 PROCEDURE — 73560 X-RAY EXAM OF KNEE 1 OR 2: CPT | Mod: 26,LT,, | Performed by: RADIOLOGY

## 2021-04-28 PROCEDURE — 97110 THERAPEUTIC EXERCISES: CPT | Mod: CQ

## 2021-04-28 PROCEDURE — 1125F AMNT PAIN NOTED PAIN PRSNT: CPT | Mod: S$GLB,,, | Performed by: PHYSICIAN ASSISTANT

## 2021-04-28 PROCEDURE — 99999 PR PBB SHADOW E&M-EST. PATIENT-LVL IV: CPT | Mod: PBBFAC,,, | Performed by: PHYSICIAN ASSISTANT

## 2021-04-28 PROCEDURE — 73564 X-RAY EXAM KNEE 4 OR MORE: CPT | Mod: TC,LT

## 2021-04-28 PROCEDURE — 73564 X-RAY EXAM KNEE 4 OR MORE: CPT | Mod: 26,LT,, | Performed by: RADIOLOGY

## 2021-04-28 PROCEDURE — 99999 PR PBB SHADOW E&M-EST. PATIENT-LVL IV: ICD-10-PCS | Mod: PBBFAC,,, | Performed by: PHYSICIAN ASSISTANT

## 2021-04-28 PROCEDURE — 73560 XR KNEE 1 OR 2 VIEW LEFT: ICD-10-PCS | Mod: 26,LT,, | Performed by: RADIOLOGY

## 2021-04-28 PROCEDURE — 3288F FALL RISK ASSESSMENT DOCD: CPT | Mod: CPTII,S$GLB,, | Performed by: PHYSICIAN ASSISTANT

## 2021-04-28 PROCEDURE — 3008F PR BODY MASS INDEX (BMI) DOCUMENTED: ICD-10-PCS | Mod: CPTII,S$GLB,, | Performed by: PHYSICIAN ASSISTANT

## 2021-04-28 PROCEDURE — 73564 XR KNEE ORTHO LEFT WITH FLEXION: ICD-10-PCS | Mod: 26,LT,, | Performed by: RADIOLOGY

## 2021-04-28 RX ORDER — OXYCODONE HYDROCHLORIDE 5 MG/1
5 TABLET ORAL EVERY 8 HOURS PRN
Qty: 21 TABLET | Refills: 0 | Status: SHIPPED | OUTPATIENT
Start: 2021-04-28 | End: 2021-05-05

## 2021-04-29 DIAGNOSIS — Z96.652 S/P TKR (TOTAL KNEE REPLACEMENT) USING CEMENT, LEFT: ICD-10-CM

## 2021-04-30 ENCOUNTER — CLINICAL SUPPORT (OUTPATIENT)
Dept: REHABILITATION | Facility: HOSPITAL | Age: 71
End: 2021-04-30
Payer: MEDICARE

## 2021-04-30 DIAGNOSIS — R26.89 IMPAIRED GAIT AND MOBILITY: ICD-10-CM

## 2021-04-30 DIAGNOSIS — M25.662 DECREASED RANGE OF MOTION (ROM) OF LEFT KNEE: ICD-10-CM

## 2021-04-30 DIAGNOSIS — M25.562 CHRONIC PAIN OF LEFT KNEE: ICD-10-CM

## 2021-04-30 DIAGNOSIS — G89.29 CHRONIC PAIN OF LEFT KNEE: ICD-10-CM

## 2021-04-30 PROCEDURE — 97110 THERAPEUTIC EXERCISES: CPT

## 2021-04-30 RX ORDER — PREGABALIN 75 MG/1
75 CAPSULE ORAL 2 TIMES DAILY
Qty: 14 CAPSULE | Refills: 0 | Status: SHIPPED | OUTPATIENT
Start: 2021-04-30 | End: 2021-05-11

## 2021-05-03 ENCOUNTER — CLINICAL SUPPORT (OUTPATIENT)
Dept: REHABILITATION | Facility: HOSPITAL | Age: 71
End: 2021-05-03
Payer: MEDICARE

## 2021-05-03 DIAGNOSIS — M25.562 CHRONIC PAIN OF LEFT KNEE: ICD-10-CM

## 2021-05-03 DIAGNOSIS — G89.29 CHRONIC PAIN OF LEFT KNEE: ICD-10-CM

## 2021-05-03 DIAGNOSIS — M25.662 DECREASED RANGE OF MOTION (ROM) OF LEFT KNEE: ICD-10-CM

## 2021-05-03 DIAGNOSIS — R26.89 IMPAIRED GAIT AND MOBILITY: ICD-10-CM

## 2021-05-03 PROCEDURE — 97110 THERAPEUTIC EXERCISES: CPT

## 2021-05-06 ENCOUNTER — CLINICAL SUPPORT (OUTPATIENT)
Dept: REHABILITATION | Facility: HOSPITAL | Age: 71
End: 2021-05-06
Payer: MEDICARE

## 2021-05-06 DIAGNOSIS — R26.89 IMPAIRED GAIT AND MOBILITY: ICD-10-CM

## 2021-05-06 DIAGNOSIS — M25.662 DECREASED RANGE OF MOTION (ROM) OF LEFT KNEE: ICD-10-CM

## 2021-05-06 DIAGNOSIS — G89.29 CHRONIC PAIN OF LEFT KNEE: ICD-10-CM

## 2021-05-06 DIAGNOSIS — M25.562 CHRONIC PAIN OF LEFT KNEE: ICD-10-CM

## 2021-05-06 PROCEDURE — 97110 THERAPEUTIC EXERCISES: CPT

## 2021-05-10 ENCOUNTER — CLINICAL SUPPORT (OUTPATIENT)
Dept: REHABILITATION | Facility: HOSPITAL | Age: 71
End: 2021-05-10
Payer: MEDICARE

## 2021-05-10 DIAGNOSIS — M25.562 CHRONIC PAIN OF LEFT KNEE: ICD-10-CM

## 2021-05-10 DIAGNOSIS — R26.89 IMPAIRED GAIT AND MOBILITY: ICD-10-CM

## 2021-05-10 DIAGNOSIS — M25.662 DECREASED RANGE OF MOTION (ROM) OF LEFT KNEE: ICD-10-CM

## 2021-05-10 DIAGNOSIS — G89.29 CHRONIC PAIN OF LEFT KNEE: ICD-10-CM

## 2021-05-10 PROCEDURE — 97110 THERAPEUTIC EXERCISES: CPT

## 2021-05-10 PROCEDURE — 97530 THERAPEUTIC ACTIVITIES: CPT

## 2021-05-11 ENCOUNTER — PATIENT MESSAGE (OUTPATIENT)
Dept: SPORTS MEDICINE | Facility: CLINIC | Age: 71
End: 2021-05-11

## 2021-05-12 ENCOUNTER — CLINICAL SUPPORT (OUTPATIENT)
Dept: REHABILITATION | Facility: HOSPITAL | Age: 71
End: 2021-05-12
Payer: MEDICARE

## 2021-05-12 ENCOUNTER — PATIENT MESSAGE (OUTPATIENT)
Dept: REHABILITATION | Facility: HOSPITAL | Age: 71
End: 2021-05-12

## 2021-05-12 DIAGNOSIS — M25.562 CHRONIC PAIN OF LEFT KNEE: ICD-10-CM

## 2021-05-12 DIAGNOSIS — M25.662 DECREASED RANGE OF MOTION (ROM) OF LEFT KNEE: ICD-10-CM

## 2021-05-12 DIAGNOSIS — G89.29 CHRONIC PAIN OF LEFT KNEE: ICD-10-CM

## 2021-05-12 DIAGNOSIS — R26.89 IMPAIRED GAIT AND MOBILITY: ICD-10-CM

## 2021-05-12 PROCEDURE — 97110 THERAPEUTIC EXERCISES: CPT

## 2021-05-14 ENCOUNTER — CLINICAL SUPPORT (OUTPATIENT)
Dept: REHABILITATION | Facility: HOSPITAL | Age: 71
End: 2021-05-14
Payer: MEDICARE

## 2021-05-14 DIAGNOSIS — M25.562 CHRONIC PAIN OF LEFT KNEE: ICD-10-CM

## 2021-05-14 DIAGNOSIS — R26.89 IMPAIRED GAIT AND MOBILITY: ICD-10-CM

## 2021-05-14 DIAGNOSIS — M25.662 DECREASED RANGE OF MOTION (ROM) OF LEFT KNEE: ICD-10-CM

## 2021-05-14 DIAGNOSIS — G89.29 CHRONIC PAIN OF LEFT KNEE: ICD-10-CM

## 2021-05-14 PROCEDURE — 97530 THERAPEUTIC ACTIVITIES: CPT

## 2021-05-14 PROCEDURE — 97110 THERAPEUTIC EXERCISES: CPT

## 2021-05-20 ENCOUNTER — TELEPHONE (OUTPATIENT)
Dept: SPORTS MEDICINE | Facility: CLINIC | Age: 71
End: 2021-05-20

## 2021-05-20 ENCOUNTER — CLINICAL SUPPORT (OUTPATIENT)
Dept: REHABILITATION | Facility: HOSPITAL | Age: 71
End: 2021-05-20
Payer: MEDICARE

## 2021-05-20 ENCOUNTER — HOSPITAL ENCOUNTER (OUTPATIENT)
Dept: CARDIOLOGY | Facility: HOSPITAL | Age: 71
Discharge: HOME | End: 2021-05-20
Attending: PHYSICIAN ASSISTANT
Payer: MEDICARE

## 2021-05-20 ENCOUNTER — TELEPHONE (OUTPATIENT)
Dept: ORTHOPEDICS | Facility: CLINIC | Age: 71
End: 2021-05-20

## 2021-05-20 ENCOUNTER — PATIENT MESSAGE (OUTPATIENT)
Dept: SPORTS MEDICINE | Facility: CLINIC | Age: 71
End: 2021-05-20

## 2021-05-20 DIAGNOSIS — I82.412 ACUTE DEEP VEIN THROMBOSIS (DVT) OF FEMORAL VEIN OF LEFT LOWER EXTREMITY: Primary | ICD-10-CM

## 2021-05-20 DIAGNOSIS — M79.605 PAIN OF LEFT LOWER EXTREMITY: Primary | ICD-10-CM

## 2021-05-20 DIAGNOSIS — M25.662 DECREASED RANGE OF MOTION (ROM) OF LEFT KNEE: ICD-10-CM

## 2021-05-20 DIAGNOSIS — M25.562 CHRONIC PAIN OF LEFT KNEE: ICD-10-CM

## 2021-05-20 DIAGNOSIS — M79.605 PAIN OF LEFT LOWER EXTREMITY: ICD-10-CM

## 2021-05-20 DIAGNOSIS — G89.29 CHRONIC PAIN OF LEFT KNEE: ICD-10-CM

## 2021-05-20 DIAGNOSIS — R26.89 IMPAIRED GAIT AND MOBILITY: ICD-10-CM

## 2021-05-20 DIAGNOSIS — Z96.652 S/P TKR (TOTAL KNEE REPLACEMENT) USING CEMENT, LEFT: ICD-10-CM

## 2021-05-20 PROCEDURE — 97530 THERAPEUTIC ACTIVITIES: CPT

## 2021-05-20 PROCEDURE — 93971 EXTREMITY STUDY: CPT | Mod: 26,LT,, | Performed by: INTERNAL MEDICINE

## 2021-05-20 PROCEDURE — 93971 CV US DOPPLER VENOUS LEG LEFT (CUPID ONLY): ICD-10-PCS | Mod: 26,LT,, | Performed by: INTERNAL MEDICINE

## 2021-05-20 PROCEDURE — 97112 NEUROMUSCULAR REEDUCATION: CPT

## 2021-05-20 PROCEDURE — 93971 EXTREMITY STUDY: CPT | Mod: LT

## 2021-05-20 PROCEDURE — 97110 THERAPEUTIC EXERCISES: CPT

## 2021-05-20 RX ORDER — PREGABALIN 75 MG/1
75 CAPSULE ORAL 2 TIMES DAILY
Qty: 14 CAPSULE | Refills: 0 | Status: SHIPPED | OUTPATIENT
Start: 2021-05-20 | End: 2021-05-27

## 2021-05-21 ENCOUNTER — PATIENT MESSAGE (OUTPATIENT)
Dept: SPORTS MEDICINE | Facility: CLINIC | Age: 71
End: 2021-05-21

## 2021-05-21 DIAGNOSIS — I82.412 ACUTE DEEP VEIN THROMBOSIS (DVT) OF FEMORAL VEIN OF LEFT LOWER EXTREMITY: Primary | ICD-10-CM

## 2021-05-25 ENCOUNTER — PATIENT MESSAGE (OUTPATIENT)
Dept: SPORTS MEDICINE | Facility: CLINIC | Age: 71
End: 2021-05-25

## 2021-05-27 ENCOUNTER — OFFICE VISIT (OUTPATIENT)
Dept: SPORTS MEDICINE | Facility: CLINIC | Age: 71
End: 2021-05-27
Payer: MEDICARE

## 2021-05-27 VITALS
DIASTOLIC BLOOD PRESSURE: 72 MMHG | HEART RATE: 94 BPM | WEIGHT: 148 LBS | HEIGHT: 66 IN | BODY MASS INDEX: 23.78 KG/M2 | SYSTOLIC BLOOD PRESSURE: 121 MMHG

## 2021-05-27 DIAGNOSIS — Z96.652 S/P TKR (TOTAL KNEE REPLACEMENT) USING CEMENT, LEFT: Primary | ICD-10-CM

## 2021-05-27 PROCEDURE — 99024 PR POST-OP FOLLOW-UP VISIT: ICD-10-PCS | Mod: S$GLB,,, | Performed by: ORTHOPAEDIC SURGERY

## 2021-05-27 PROCEDURE — 1126F PR PAIN SEVERITY QUANTIFIED, NO PAIN PRESENT: ICD-10-PCS | Mod: S$GLB,,, | Performed by: ORTHOPAEDIC SURGERY

## 2021-05-27 PROCEDURE — 3008F PR BODY MASS INDEX (BMI) DOCUMENTED: ICD-10-PCS | Mod: CPTII,S$GLB,, | Performed by: ORTHOPAEDIC SURGERY

## 2021-05-27 PROCEDURE — 3008F BODY MASS INDEX DOCD: CPT | Mod: CPTII,S$GLB,, | Performed by: ORTHOPAEDIC SURGERY

## 2021-05-27 PROCEDURE — 1101F PT FALLS ASSESS-DOCD LE1/YR: CPT | Mod: CPTII,S$GLB,, | Performed by: ORTHOPAEDIC SURGERY

## 2021-05-27 PROCEDURE — 99999 PR PBB SHADOW E&M-EST. PATIENT-LVL II: CPT | Mod: PBBFAC,,, | Performed by: ORTHOPAEDIC SURGERY

## 2021-05-27 PROCEDURE — 1101F PR PT FALLS ASSESS DOC 0-1 FALLS W/OUT INJ PAST YR: ICD-10-PCS | Mod: CPTII,S$GLB,, | Performed by: ORTHOPAEDIC SURGERY

## 2021-05-27 PROCEDURE — 1126F AMNT PAIN NOTED NONE PRSNT: CPT | Mod: S$GLB,,, | Performed by: ORTHOPAEDIC SURGERY

## 2021-05-27 PROCEDURE — 99999 PR PBB SHADOW E&M-EST. PATIENT-LVL II: ICD-10-PCS | Mod: PBBFAC,,, | Performed by: ORTHOPAEDIC SURGERY

## 2021-05-27 PROCEDURE — 99024 POSTOP FOLLOW-UP VISIT: CPT | Mod: S$GLB,,, | Performed by: ORTHOPAEDIC SURGERY

## 2021-05-27 PROCEDURE — 3288F PR FALLS RISK ASSESSMENT DOCUMENTED: ICD-10-PCS | Mod: CPTII,S$GLB,, | Performed by: ORTHOPAEDIC SURGERY

## 2021-05-27 PROCEDURE — 3288F FALL RISK ASSESSMENT DOCD: CPT | Mod: CPTII,S$GLB,, | Performed by: ORTHOPAEDIC SURGERY

## 2021-07-08 ENCOUNTER — HOSPITAL ENCOUNTER (OUTPATIENT)
Dept: RADIOLOGY | Facility: HOSPITAL | Age: 71
Discharge: HOME | End: 2021-07-08
Attending: PHYSICIAN ASSISTANT
Payer: MEDICARE

## 2021-07-08 ENCOUNTER — OFFICE VISIT (OUTPATIENT)
Dept: SPORTS MEDICINE | Facility: CLINIC | Age: 71
End: 2021-07-08
Payer: MEDICARE

## 2021-07-08 VITALS
WEIGHT: 144 LBS | DIASTOLIC BLOOD PRESSURE: 66 MMHG | HEIGHT: 66 IN | SYSTOLIC BLOOD PRESSURE: 99 MMHG | HEART RATE: 80 BPM | BODY MASS INDEX: 23.14 KG/M2

## 2021-07-08 DIAGNOSIS — Z96.652 S/P TKR (TOTAL KNEE REPLACEMENT) USING CEMENT, LEFT: ICD-10-CM

## 2021-07-08 DIAGNOSIS — Z96.652 S/P TKR (TOTAL KNEE REPLACEMENT) USING CEMENT, LEFT: Primary | ICD-10-CM

## 2021-07-08 PROCEDURE — 1101F PR PT FALLS ASSESS DOC 0-1 FALLS W/OUT INJ PAST YR: ICD-10-PCS | Mod: CPTII,S$GLB,, | Performed by: PHYSICIAN ASSISTANT

## 2021-07-08 PROCEDURE — 73562 XR KNEE ORTHO LEFT WITH FLEXION: ICD-10-PCS | Mod: 26,RT,, | Performed by: RADIOLOGY

## 2021-07-08 PROCEDURE — 3008F BODY MASS INDEX DOCD: CPT | Mod: CPTII,S$GLB,, | Performed by: PHYSICIAN ASSISTANT

## 2021-07-08 PROCEDURE — 1125F PR PAIN SEVERITY QUANTIFIED, PAIN PRESENT: ICD-10-PCS | Mod: S$GLB,,, | Performed by: PHYSICIAN ASSISTANT

## 2021-07-08 PROCEDURE — 1101F PT FALLS ASSESS-DOCD LE1/YR: CPT | Mod: CPTII,S$GLB,, | Performed by: PHYSICIAN ASSISTANT

## 2021-07-08 PROCEDURE — 99024 PR POST-OP FOLLOW-UP VISIT: ICD-10-PCS | Mod: S$GLB,,, | Performed by: PHYSICIAN ASSISTANT

## 2021-07-08 PROCEDURE — 73564 XR KNEE ORTHO LEFT WITH FLEXION: ICD-10-PCS | Mod: 26,LT,, | Performed by: RADIOLOGY

## 2021-07-08 PROCEDURE — 3288F FALL RISK ASSESSMENT DOCD: CPT | Mod: CPTII,S$GLB,, | Performed by: PHYSICIAN ASSISTANT

## 2021-07-08 PROCEDURE — 99999 PR PBB SHADOW E&M-EST. PATIENT-LVL III: CPT | Mod: PBBFAC,,, | Performed by: PHYSICIAN ASSISTANT

## 2021-07-08 PROCEDURE — 99999 PR PBB SHADOW E&M-EST. PATIENT-LVL III: ICD-10-PCS | Mod: PBBFAC,,, | Performed by: PHYSICIAN ASSISTANT

## 2021-07-08 PROCEDURE — 3008F PR BODY MASS INDEX (BMI) DOCUMENTED: ICD-10-PCS | Mod: CPTII,S$GLB,, | Performed by: PHYSICIAN ASSISTANT

## 2021-07-08 PROCEDURE — 3288F PR FALLS RISK ASSESSMENT DOCUMENTED: ICD-10-PCS | Mod: CPTII,S$GLB,, | Performed by: PHYSICIAN ASSISTANT

## 2021-07-08 PROCEDURE — 73564 X-RAY EXAM KNEE 4 OR MORE: CPT | Mod: TC,LT

## 2021-07-08 PROCEDURE — 99024 POSTOP FOLLOW-UP VISIT: CPT | Mod: S$GLB,,, | Performed by: PHYSICIAN ASSISTANT

## 2021-07-08 PROCEDURE — 73562 X-RAY EXAM OF KNEE 3: CPT | Mod: 26,RT,, | Performed by: RADIOLOGY

## 2021-07-08 PROCEDURE — 73564 X-RAY EXAM KNEE 4 OR MORE: CPT | Mod: 26,LT,, | Performed by: RADIOLOGY

## 2021-07-08 PROCEDURE — 1125F AMNT PAIN NOTED PAIN PRSNT: CPT | Mod: S$GLB,,, | Performed by: PHYSICIAN ASSISTANT

## 2021-07-12 ENCOUNTER — PATIENT MESSAGE (OUTPATIENT)
Dept: SPORTS MEDICINE | Facility: CLINIC | Age: 71
End: 2021-07-12

## 2021-07-12 DIAGNOSIS — Z96.652 S/P TKR (TOTAL KNEE REPLACEMENT) USING CEMENT, LEFT: Primary | ICD-10-CM

## 2021-07-12 RX ORDER — CEPHALEXIN 500 MG/1
500 CAPSULE ORAL 4 TIMES DAILY
Qty: 12 CAPSULE | Refills: 0 | Status: SHIPPED | OUTPATIENT
Start: 2021-07-12 | End: 2021-07-15

## 2021-11-17 ENCOUNTER — TELEPHONE (OUTPATIENT)
Dept: SPORTS MEDICINE | Facility: CLINIC | Age: 71
End: 2021-11-17
Payer: MEDICARE

## 2021-12-07 ENCOUNTER — HOSPITAL ENCOUNTER (OUTPATIENT)
Dept: RADIOLOGY | Facility: HOSPITAL | Age: 71
Discharge: HOME | End: 2021-12-07
Attending: ORTHOPAEDIC SURGERY
Payer: MEDICARE

## 2021-12-07 ENCOUNTER — OFFICE VISIT (OUTPATIENT)
Dept: SPORTS MEDICINE | Facility: CLINIC | Age: 71
End: 2021-12-07
Payer: MEDICARE

## 2021-12-07 VITALS
WEIGHT: 148 LBS | BODY MASS INDEX: 23.78 KG/M2 | HEIGHT: 66 IN | SYSTOLIC BLOOD PRESSURE: 115 MMHG | HEART RATE: 86 BPM | DIASTOLIC BLOOD PRESSURE: 72 MMHG

## 2021-12-07 DIAGNOSIS — Z96.652 S/P TKR (TOTAL KNEE REPLACEMENT) USING CEMENT, LEFT: ICD-10-CM

## 2021-12-07 DIAGNOSIS — M17.11 PRIMARY OSTEOARTHRITIS OF RIGHT KNEE: ICD-10-CM

## 2021-12-07 DIAGNOSIS — M17.11 PRIMARY OSTEOARTHRITIS OF RIGHT KNEE: Primary | ICD-10-CM

## 2021-12-07 PROCEDURE — 99999 PR PBB SHADOW E&M-EST. PATIENT-LVL III: ICD-10-PCS | Mod: PBBFAC,,, | Performed by: ORTHOPAEDIC SURGERY

## 2021-12-07 PROCEDURE — 4010F PR ACE/ARB THEARPY RXD/TAKEN: ICD-10-PCS | Mod: CPTII,S$GLB,, | Performed by: ORTHOPAEDIC SURGERY

## 2021-12-07 PROCEDURE — 73564 X-RAY EXAM KNEE 4 OR MORE: CPT | Mod: 26,50,, | Performed by: RADIOLOGY

## 2021-12-07 PROCEDURE — 77073 BONE LENGTH STUDIES: CPT | Mod: TC

## 2021-12-07 PROCEDURE — 99999 PR PBB SHADOW E&M-EST. PATIENT-LVL III: CPT | Mod: PBBFAC,,, | Performed by: ORTHOPAEDIC SURGERY

## 2021-12-07 PROCEDURE — 77073 XR HIP TO ANKLE: ICD-10-PCS | Mod: 26,,, | Performed by: RADIOLOGY

## 2021-12-07 PROCEDURE — 99214 PR OFFICE/OUTPT VISIT, EST, LEVL IV, 30-39 MIN: ICD-10-PCS | Mod: S$GLB,,, | Performed by: ORTHOPAEDIC SURGERY

## 2021-12-07 PROCEDURE — 73564 X-RAY EXAM KNEE 4 OR MORE: CPT | Mod: TC,50

## 2021-12-07 PROCEDURE — 99214 OFFICE O/P EST MOD 30 MIN: CPT | Mod: S$GLB,,, | Performed by: ORTHOPAEDIC SURGERY

## 2021-12-07 PROCEDURE — 77073 BONE LENGTH STUDIES: CPT | Mod: 26,,, | Performed by: RADIOLOGY

## 2021-12-07 PROCEDURE — 73564 XR KNEE ORTHO BILAT WITH FLEXION: ICD-10-PCS | Mod: 26,50,, | Performed by: RADIOLOGY

## 2021-12-07 PROCEDURE — 4010F ACE/ARB THERAPY RXD/TAKEN: CPT | Mod: CPTII,S$GLB,, | Performed by: ORTHOPAEDIC SURGERY

## 2021-12-08 DIAGNOSIS — M17.11 PRIMARY OSTEOARTHRITIS OF RIGHT KNEE: Primary | ICD-10-CM

## 2021-12-10 ENCOUNTER — TELEPHONE (OUTPATIENT)
Dept: SPORTS MEDICINE | Facility: CLINIC | Age: 71
End: 2021-12-10
Payer: MEDICARE

## 2021-12-14 ENCOUNTER — TELEPHONE (OUTPATIENT)
Dept: SPORTS MEDICINE | Facility: CLINIC | Age: 71
End: 2021-12-14
Payer: MEDICARE

## 2021-12-14 DIAGNOSIS — Z01.818 PRE-OP TESTING: Primary | ICD-10-CM

## 2021-12-14 DIAGNOSIS — E11.9 TYPE 2 DIABETES MELLITUS WITHOUT COMPLICATION, UNSPECIFIED WHETHER LONG TERM INSULIN USE: ICD-10-CM

## 2021-12-14 DIAGNOSIS — M79.609 PAIN IN EXTREMITY, UNSPECIFIED EXTREMITY: ICD-10-CM

## 2021-12-14 DIAGNOSIS — E03.9 HYPOTHYROIDISM, UNSPECIFIED TYPE: ICD-10-CM

## 2021-12-15 ENCOUNTER — PATIENT MESSAGE (OUTPATIENT)
Dept: SPORTS MEDICINE | Facility: CLINIC | Age: 71
End: 2021-12-15
Payer: MEDICARE

## 2023-10-26 ENCOUNTER — OFFICE VISIT (OUTPATIENT)
Dept: SPORTS MEDICINE | Facility: CLINIC | Age: 73
End: 2023-10-26
Payer: MEDICARE

## 2023-10-26 ENCOUNTER — HOSPITAL ENCOUNTER (OUTPATIENT)
Dept: RADIOLOGY | Facility: HOSPITAL | Age: 73
Discharge: HOME | End: 2023-10-26
Attending: ORTHOPAEDIC SURGERY
Payer: MEDICARE

## 2023-10-26 VITALS
BODY MASS INDEX: 23.03 KG/M2 | WEIGHT: 143.31 LBS | HEIGHT: 66 IN | HEART RATE: 93 BPM | SYSTOLIC BLOOD PRESSURE: 112 MMHG | DIASTOLIC BLOOD PRESSURE: 69 MMHG

## 2023-10-26 DIAGNOSIS — M25.561 RIGHT KNEE PAIN, UNSPECIFIED CHRONICITY: ICD-10-CM

## 2023-10-26 DIAGNOSIS — M25.562 ACUTE PAIN OF LEFT KNEE: Primary | ICD-10-CM

## 2023-10-26 DIAGNOSIS — M25.562 LEFT KNEE PAIN, UNSPECIFIED CHRONICITY: ICD-10-CM

## 2023-10-26 DIAGNOSIS — Z96.652 S/P TKR (TOTAL KNEE REPLACEMENT) USING CEMENT, LEFT: ICD-10-CM

## 2023-10-26 PROCEDURE — 99214 PR OFFICE/OUTPT VISIT, EST, LEVL IV, 30-39 MIN: ICD-10-PCS | Mod: S$GLB,,, | Performed by: ORTHOPAEDIC SURGERY

## 2023-10-26 PROCEDURE — 1125F PR PAIN SEVERITY QUANTIFIED, PAIN PRESENT: ICD-10-PCS | Mod: CPTII,S$GLB,, | Performed by: ORTHOPAEDIC SURGERY

## 2023-10-26 PROCEDURE — 1159F PR MEDICATION LIST DOCUMENTED IN MEDICAL RECORD: ICD-10-PCS | Mod: CPTII,S$GLB,, | Performed by: ORTHOPAEDIC SURGERY

## 2023-10-26 PROCEDURE — 73564 X-RAY EXAM KNEE 4 OR MORE: CPT | Mod: 26,LT,, | Performed by: RADIOLOGY

## 2023-10-26 PROCEDURE — 73562 X-RAY EXAM OF KNEE 3: CPT | Mod: TC,RT

## 2023-10-26 PROCEDURE — 73564 XR KNEE ORTHO LEFT WITH FLEXION: ICD-10-PCS | Mod: 26,LT,, | Performed by: RADIOLOGY

## 2023-10-26 PROCEDURE — 1125F AMNT PAIN NOTED PAIN PRSNT: CPT | Mod: CPTII,S$GLB,, | Performed by: ORTHOPAEDIC SURGERY

## 2023-10-26 PROCEDURE — 99999 PR PBB SHADOW E&M-EST. PATIENT-LVL III: ICD-10-PCS | Mod: PBBFAC,,, | Performed by: ORTHOPAEDIC SURGERY

## 2023-10-26 PROCEDURE — 3288F FALL RISK ASSESSMENT DOCD: CPT | Mod: CPTII,S$GLB,, | Performed by: ORTHOPAEDIC SURGERY

## 2023-10-26 PROCEDURE — 3008F BODY MASS INDEX DOCD: CPT | Mod: CPTII,S$GLB,, | Performed by: ORTHOPAEDIC SURGERY

## 2023-10-26 PROCEDURE — 3074F PR MOST RECENT SYSTOLIC BLOOD PRESSURE < 130 MM HG: ICD-10-PCS | Mod: CPTII,S$GLB,, | Performed by: ORTHOPAEDIC SURGERY

## 2023-10-26 PROCEDURE — 3078F PR MOST RECENT DIASTOLIC BLOOD PRESSURE < 80 MM HG: ICD-10-PCS | Mod: CPTII,S$GLB,, | Performed by: ORTHOPAEDIC SURGERY

## 2023-10-26 PROCEDURE — 73562 XR KNEE ORTHO LEFT WITH FLEXION: ICD-10-PCS | Mod: 26,RT,, | Performed by: RADIOLOGY

## 2023-10-26 PROCEDURE — 73562 X-RAY EXAM OF KNEE 3: CPT | Mod: 26,RT,, | Performed by: RADIOLOGY

## 2023-10-26 PROCEDURE — 3078F DIAST BP <80 MM HG: CPT | Mod: CPTII,S$GLB,, | Performed by: ORTHOPAEDIC SURGERY

## 2023-10-26 PROCEDURE — 99999 PR PBB SHADOW E&M-EST. PATIENT-LVL III: CPT | Mod: PBBFAC,,, | Performed by: ORTHOPAEDIC SURGERY

## 2023-10-26 PROCEDURE — 1101F PR PT FALLS ASSESS DOC 0-1 FALLS W/OUT INJ PAST YR: ICD-10-PCS | Mod: CPTII,S$GLB,, | Performed by: ORTHOPAEDIC SURGERY

## 2023-10-26 PROCEDURE — 1159F MED LIST DOCD IN RCRD: CPT | Mod: CPTII,S$GLB,, | Performed by: ORTHOPAEDIC SURGERY

## 2023-10-26 PROCEDURE — 3074F SYST BP LT 130 MM HG: CPT | Mod: CPTII,S$GLB,, | Performed by: ORTHOPAEDIC SURGERY

## 2023-10-26 PROCEDURE — 1101F PT FALLS ASSESS-DOCD LE1/YR: CPT | Mod: CPTII,S$GLB,, | Performed by: ORTHOPAEDIC SURGERY

## 2023-10-26 PROCEDURE — 3008F PR BODY MASS INDEX (BMI) DOCUMENTED: ICD-10-PCS | Mod: CPTII,S$GLB,, | Performed by: ORTHOPAEDIC SURGERY

## 2023-10-26 PROCEDURE — 99214 OFFICE O/P EST MOD 30 MIN: CPT | Mod: S$GLB,,, | Performed by: ORTHOPAEDIC SURGERY

## 2023-10-26 PROCEDURE — 3288F PR FALLS RISK ASSESSMENT DOCUMENTED: ICD-10-PCS | Mod: CPTII,S$GLB,, | Performed by: ORTHOPAEDIC SURGERY

## 2023-10-26 RX ORDER — CELECOXIB 200 MG/1
200 CAPSULE ORAL DAILY
Qty: 30 CAPSULE | Refills: 1 | Status: SHIPPED | OUTPATIENT
Start: 2023-10-26

## 2023-10-26 RX ORDER — METHYLPREDNISOLONE 4 MG/1
TABLET ORAL
Qty: 1 EACH | Refills: 0 | Status: SHIPPED | OUTPATIENT
Start: 2023-10-26

## 2023-10-26 NOTE — PROGRESS NOTES
CC: Left knee F/U, right knee pain    DATE OF PROCEDURE: 4/14/2021   PROCEDURES PERFORMED:   Left total knee arthroplasty      Sagrario returns back to clinic for follow up of left knee doing well just over 2 years 6 months s/p the above mentioned procedure.  States generally speaking the knee is much better compared to preop.  That has been the case since surgery for the most part.  She is had some intermittent soreness in the knee with mild pain particularly at the end of the day.  Unfortunately that has worsened a bit recently.  She was hospital briefly for a kidney stone and developed by her account sepsis.  This was treated with antibiotics.  She underwent a surgical procedure for the kidney stone.  Since then the knee has felt worse.  Pain notable mostly at night.  States that she does not notice the pain as much during the day.  Localizes over the anterior/retropatellar region.  Not taking any anti-inflammatory medication or oral medication for the knee at this time.  No ipsilateral groin or hip pain.  No back or radicular symptoms.  States the right knee as tolerable.  Has known advanced DJD on that side.    Prior Hx 12/7/21:  Sagrario Freire reports to be doing well just under 5 months s/p the above mentioned procedure. Diagnostic ultrasound from 5/20/21 was positive for DVT. She is no longer taking Eliquis 5 mg BID. She has mild pain intermittently, but this is much better comparison to pre op. Going to PT 1-2xWeek at the [place] location. Seeing good progress daily with the knee otherwise. Overall, doing well and pleased with her outcome after surgery.     Patient has been having pain in her right knee recently. She has known history of advanced osteoarthritis. Pain is located over the anterior knee. Pain with walking, standing, sitting for long periods of time and using the stairs. Previous CSI on 8/19/21 from Dr. Freire which provided no relief. Would like to discuss treatment options  "today.    REVIEW OF SYSTEMS:   Constitution: Negative. Negative for chills, fever and night sweats.    Hematologic/Lymphatic: Negative for bleeding problem. Does not bruise/bleed easily.   Skin: Negative for dry skin, itching and rash.   Musculoskeletal: Negative for falls. Positive for left knee pain and muscle weakness.     All other review of symptoms were reviewed and found to be noncontributory.     PAST MEDICAL HISTORY:   Past Medical History:   Diagnosis Date    Abnormal ECG     Frequently monitored by primary per pt    Diabetes mellitus, type 2     GERD (gastroesophageal reflux disease)     Hypothyroidism      PAST SURGICAL HISTORY:   Past Surgical History:   Procedure Laterality Date    ABDOMINAL HERNIA REPAIR      BELT ABDOMINOPLASTY      COLECTOMY      partial     hand operation Right     "scraped the tendon"    TOTAL KNEE ARTHROPLASTY Left 4/14/2021    Procedure: ARTHROPLASTY, KNEE, TOTAL;  Surgeon: SANTO Hector MD;  Location: [facility];  Service: Orthopedics;  Laterality: Left;  outpatient with 23hr observation  Regional with catheter- spinal & adductor  pericapsular injection: 30cc VITO injection     FAMILY HISTORY:   Family History   Problem Relation Age of Onset    Heart disease Mother     COPD Mother     Diabetes Mother     Heart disease Father     Lymphoma Father      SOCIAL HISTORY:   Social History     Socioeconomic History    Marital status:      Spouse name: Armin    Number of children: 3   Occupational History     Comment:    Tobacco Use    Smoking status: Never   Substance and Sexual Activity    Alcohol use: Not Currently    Drug use: Never    Sexual activity: Yes   Social History Narrative    Lives in house with no steps     MEDICATIONS:     Current Outpatient Medications:     ALPRAZolam (XANAX) 0.5 MG tablet, Take 0.5 mg by mouth every 12 (twelve) hours. Take as scheduled, Disp: , Rfl: 0    atorvastatin (LIPITOR) 10 MG tablet, Take 10 mg by mouth once daily. Take as " scheduled, Disp: , Rfl:     dulaglutide (TRULICITY) 1.5 mg/0.5 mL PnIj, Inject 3 mg into the skin. Take as scheduled, Disp: , Rfl:     famotidine (PEPCID) 20 MG tablet, Take 20 mg by mouth once. Take as scheduled, Disp: , Rfl:     glimepiride (AMARYL) 2 MG tablet, Take 2 mg by mouth every 12 (twelve) hours. Hold morning of surgery, Disp: , Rfl:     insulin NPH-insulin regular, 70/30, (NOVOLIN 70/30) 100 unit/mL (70-30) injection, Inject 10 Units into the skin 2 (two) times daily. Take 100% pm before surgery=10 units Take 30% am of surgery=3 units, Disp: , Rfl:     levothyroxine (SYNTHROID) 50 MCG tablet, Take 50 mcg by mouth once daily. Take as scheduled, Disp: , Rfl:     zolpidem (AMBIEN) 10 mg Tab, Take 10 mg by mouth once daily. Take as scheduled, Disp: , Rfl: 0    aspirin (ECOTRIN) 81 MG EC tablet, Take 1 tablet (81 mg total) by mouth 2 (two) times daily. (Patient taking differently: Take 81 mg by mouth once. Ok to take as scheduled), Disp: 56 tablet, Rfl: 0    celecoxib (CELEBREX) 200 MG capsule, Take 1 capsule (200 mg total) by mouth once daily., Disp: 30 capsule, Rfl: 1    metFORMIN (GLUCOPHAGE) 1000 MG tablet, Take 1,000 mg by mouth 2 (two) times daily with meals. Hold night before surgery and morning of surgery, Disp: , Rfl:     methylPREDNISolone (MEDROL DOSEPACK) 4 mg tablet, use as directed, Disp: 1 each, Rfl: 0    pregabalin (LYRICA) 75 MG capsule, Take 1 capsule (75 mg total) by mouth 2 (two) times daily. for 7 days, Disp: 14 capsule, Rfl: 0  No current facility-administered medications for this visit.    Facility-Administered Medications Ordered in Other Visits:     fentaNYL 50 mcg/mL injection 25 mcg, 25 mcg, Intravenous, Q5 Min PRN, Chema Burt MD, 50 mcg at 04/14/21 1305    midazolam (VERSED) 1 mg/mL injection 0.5 mg, 0.5 mg, Intravenous, PRN, Chema Burt MD, 2 mg at 04/14/21 1300    ropivacaine 0.2% Nimbus PainPRO Pump infusion 500 ML, , Perineural, Continuous,  "Chema Burt MD, New Bag at 04/14/21 1810    ALLERGIES:   Review of patient's allergies indicates:  No Known Allergies     PHYSICAL EXAMINATION:  /69   Pulse 93   Ht 5' 6" (1.676 m)   Wt 65 kg (143 lb 4.8 oz)   BMI 23.13 kg/m²   General: Well-developed well-nourished 73 y.o. femalein no acute distress   Cardiovascular: Regular rhythm by palpation of distal pulse, normal color and temperature, no concerning varicosities on symptomatic side   Lungs: No labored breathing or wheezing appreciated   Neuro: Alert and oriented ×3   Psychiatric: well oriented to person, place and time, demonstrates normal mood and affect   Skin: No rashes, lesions or ulcers, normal temperature, turgor, and texture on involved extremity.    Ortho/SPM Exam  Examination of the left knee demonstrates a well-healed prior incision.  Absolutely no swelling.  No warmth or erythema.  No signs of inflammation or infection.  Intact extensor mechanism.  Able to extend the knee to near full extension.  Good quad activation and strength.  Active flexion with ease to 125°.  No apparent pain.  Central patellar tracking.  Stable to varus and valgus stress.  No mid flexion instability.  No significant point areas of tenderness or pain.    IMAGING:  X-rays including standing, weight bearing AP and flexion bilateral knees, lateral and sunrise views ordered and images reviewed by me show:    Well-positioned total knee prosthesis.  No signs of loosening, lucency or complication otherwise.    ASSESSMENT:      ICD-10-CM ICD-9-CM   1. Acute pain of left knee  M25.562 719.46   2. S/P TKR (total knee replacement) using cement, left  Z96.652 V43.65       PLAN:     Findings discussed with the patient.  She has a completely benign-appearing knee on exam.  X-rays are appropriate.  Her history is a bit concerning given her recent hospitalization for sepsis.  With that said I see no clinical signs of infection and it sounds like this is more of a " nighttime issue.  She just had surgery for the renal stone 4 weeks ago and inflammatory markers can be a bit misleading.  She has no effusion today in no drainable fluid that I can detect.  As such we have discussed a course of anti-inflammatory medication (1st Medrol Dosepak then Celebrex) to see how she responds.  Discussed appropriate activity modifications for rest.  We will see how she does.  If pain is persistent or recurrent despite these measures, next step would be inflammatory markers and possible needle aspiration to rule out infection.  All questions answered.  She will keep us updated.

## 2024-01-31 ENCOUNTER — PATIENT MESSAGE (OUTPATIENT)
Dept: SPORTS MEDICINE | Facility: CLINIC | Age: 74
End: 2024-01-31

## 2024-01-31 DIAGNOSIS — Z96.652 S/P TKR (TOTAL KNEE REPLACEMENT) USING CEMENT, LEFT: Primary | ICD-10-CM

## 2024-01-31 NOTE — TELEPHONE ENCOUNTER
Dentist  (Newest Message First)  View All Conversations on this Encounter  Suzie Taylor MA routed conversation to You7 minutes ago (3:33 PM)     Audrey Sutton MA routed conversation to Krunal Vang Staff18 minutes ago (3:22 PM)     Sagrario BERNSTEIN [facility] Sports Clinical Staff (supporting Francois Garduno PA-C)20 minutes ago (3:20 PM)     Going to dentist Monday  Need antibiotics before I go  Should take a couple of visits maybe three

## 2024-02-01 RX ORDER — CEPHALEXIN 500 MG/1
500 CAPSULE ORAL 4 TIMES DAILY
Qty: 4 CAPSULE | Refills: 2 | Status: SHIPPED | OUTPATIENT
Start: 2024-02-01

## 2024-08-27 ENCOUNTER — TELEPHONE (OUTPATIENT)
Dept: SPORTS MEDICINE | Facility: CLINIC | Age: 74
End: 2024-08-27
Payer: MEDICARE

## 2024-09-13 NOTE — PROGRESS NOTES
HISTORY OF PRESENT ILLNESS   Sagrario Freire, a 74 y.o. female, presents today for evaluation of her right KNEE.    Patient reports onset of chronic pain beginning a few years ago.. Patient reports no known injury or trauma. Pain is located along medial aspect of KNEE. Pain is 0/10 at present & up to 10/10 with provacative activity including sitting to standing and getting in and out of her vehicle . Pain is described as sharp. Patient states pain does not radiate.     Associated symptoms include swelling, instability, and weakness. Pain is aggravated by activities above & occur daily . Symptoms do interfere with sleep. Patient reports pain & symptoms are getting worse . Patient reports no prior surgery to KNEE.     Prior treatment Sagrario Freire has tried   OTC Acetaminophen - Yes  OTC NSAID - Yes - ibuprofen   Rx NSAID - No   Rx Narcotic/Other - Gabapentin for neuropathy   Brace - No   Injection - Cortisone - Yes - last one with Dr. Freire in 2020   Injection - Viscosupplementation - No  Injection - Biologics - No   Activity Modification - Yes  Physical Therapy - Yes - done previously    Home Exercise Program - No  Assistive Device - No  Other - ice    Review of Systems (ROS)  A 10+ review of systems was performed with pertinent positives and negatives noted above in the history of present illness. Other systems were negative unless otherwise specified.    Physical Examination (PE)  General:  The patient is alert and oriented x 3. Mood is pleasant. Observation of ears, eyes and nose reveal no gross abnormalities. HEENT: NCAT, sclera anicteric.   Lungs: Respirations are equal and unlabored.  Gait is coordinated. Patient can toe walk and heel walk without difficulty.    KNEE EXAMINATION    Observation/Inspection  Gait:   Antalgic   Alignment:  Neutral   Scars:   None   Muscle atrophy: Mild  Effusion:  None   Warmth:  None   Discoloration:   none     Tenderness / Crepitus (T / C):         T / C      T /  C  Patella   - / -   Lateral joint line   - / -     Peripatellar medial  -  Medial joint line    + / -  Peripatellar lateral -  Medial plica   - / -  Patellar tendon -   Popliteal fossa   - / -  Quad tendon   -   Gastrocnemius   -  Prepatellar Bursa - / -   Quadricep   -  Tibial tubercle  -  Thigh/hamstring  -  Pes anserine/HS -  Fibula    -  ITB   - / -  Tibia     -  Tib/fib joint  - / -  LCL    -    MFC   - / -   MCL: Proximal  -    LFC   - / -   Distal    -          ROM: (* = pain)  PASSIVE   ACTIVE    Left :   5 / 0 / 145   5 / 0 / 145     Right :    5 / 0 / 145   5 / 0 / 145    Patellofemoral examination:  See above noted areas of tenderness.   Patella position    Subluxation / dislocation: Centered        Sup. / Inf;   Normal   Crepitus (PF):    Absent   Patellar Mobility:       Medial-lateral:   Normal    Superior-inferior:  Normal    Inferior tilt   Normal    Patellar tendon:  Normal   Lateral tilt:    Normal   J-sign:     None   Patellofemoral grind:   No pain     Meniscal Signs:     Pain on terminal extension:  +  Pain on terminal flexion:  +  Andressas maneuver:  +*  Squat     NT  Thesaly    NT    Ligament Examination:  ACL / Lachman:  WNL  PCL-Post.  drawer: normal 0 to 2mm  MCL- Valgus:  normal 0 to 2mm  LCL- Varus:    normal 0 to 2mm  Pivot shift:  guarding   Dial Test:   difference c/w other side   At 30° flexion: normal (< 5°)    At 90° flexion: normal (< 5°)   Reverse Pivot Shift:   normal (Equal)     Strength: (* = with pain) Painful Side  Quadriceps   5/5  Hamstrin/5    Extremity Neuro-vascular Examination:   Sensation:  Grossly intact to light touch all dermatomal regions.   Motor Function:  Fully intact motor function at hip, knee, foot and ankle    DTRs;  quadriceps and  achilles 2+.  No clonus and downgoing Babinski.    Vascular status:  DP and PT pulses 2+, brisk capillary refill, symmetric.     Other Findings:    ASSESSMENT & PLAN  Assessment  #1 Kellgren-Jeyson Grade III  osteoarthritis of knee, right  #2 s/p TKA, left    No evidence of neurologic pathology  No evidence of vascular pathology    Imaging studies reviewed:   X-ray knee, right, 9/23/24    Plan  We discussed the importance of appropriate diet, weight, and regular exercise    We discussed options including    Watchful waiting / relative rest    Physical therapy X    Injection therapy intra-articular knee CSI, right   Consultation    The patient chooses As above   x = prescribed  CSI = corticosteroid injection  VSI = viscosupplement injection  PRPI = platelet rich plasma injection  ia = intra articular  R = right  L = left  B = bilateral   nfSx = surgical consultation was recommended, but patient is not interested in consultation at this time    Physical Therapy        Formal (fPT), @ [facility] facility p   Formal (fPT), @ [facility] facility        Homegoing (hgPT), per concurrent fPT recommendations    Homegoing (hgPT), per prior fPT recommendations t   Homegoing (hgPT), handout provided        w/  (atPT)    [blank] = not prescribed  x = prescribed  b = prescribed, and begin as indicated  t = continue as indicated  r = prescribed, and restart as indicated  p = completed prior as indicated  hs = prescribed, and with high school   col = prescribed, and with college or university   nfPT = physical therapy was recommended, but patient is not interested in PT at this time    Activity (e.g. sports, work) restrictions    [blank] = as tolerated  pt = per physical therapist  at = per   NWB = non weight bearing on affected lower extremity, with crutches assistance for ambulation    Bracing    [blank] = not prescribed  r = recommended, but not fit with at todays visit  f = prescribed and fit with at todays visit  t = continue as indicated  d = d/c  p = as needed  rare = use on rare, as-needed basis; advised against chronic use    Pain management    [blank] = No prescription  necessary. A handout detailing dosing of appropriate   over-the-counter musculoskeletal analgesics was made available to the patient.   m = meloxicam x 14 days  mp = 14 day course of meloxicam prescribed prior    Follow up TKA in 01/2025   [blank] = as needed  [number] = in [number] weeks  CSI = for corticosteroid injection  VSI = for viscosupplement injection or injection series  PRP = for platelet rich plasma injection or injection series  MRI = after MRI imaging  ns = should surgical options be deferred (no surgery)  o = appointment offered, deferred by patient    Should symptoms worsen or fail to resolve, consider    Revisiting the above options and / or      Vocation:

## 2024-09-20 ENCOUNTER — TELEPHONE (OUTPATIENT)
Dept: SPORTS MEDICINE | Facility: CLINIC | Age: 74
End: 2024-09-20
Payer: MEDICARE

## 2024-09-20 NOTE — TELEPHONE ENCOUNTER
Scheduled patient for appt for right shoulder as Dr. Freire will not see her for both her knee and her shoulder at her appt on Monday. Patient expressed understanding.    Gita Armendariz MS, [facility]     Sports Medicine Assistant to:  Kendrick Freire M.D.  System Director, [facility]  Head Medical Team Physician, [place]  President-Elect, [employer]  Chief Medical Officer, [place]

## 2024-09-23 ENCOUNTER — HOSPITAL ENCOUNTER (OUTPATIENT)
Dept: RADIOLOGY | Facility: HOSPITAL | Age: 74
Discharge: HOME | End: 2024-09-23
Attending: FAMILY MEDICINE
Payer: MEDICARE

## 2024-09-23 ENCOUNTER — OFFICE VISIT (OUTPATIENT)
Dept: SPORTS MEDICINE | Facility: CLINIC | Age: 74
End: 2024-09-23
Payer: MEDICARE

## 2024-09-23 VITALS
DIASTOLIC BLOOD PRESSURE: 77 MMHG | HEART RATE: 83 BPM | WEIGHT: 143.31 LBS | HEIGHT: 66 IN | BODY MASS INDEX: 23.03 KG/M2 | SYSTOLIC BLOOD PRESSURE: 115 MMHG

## 2024-09-23 DIAGNOSIS — M25.569 KNEE PAIN, UNSPECIFIED CHRONICITY, UNSPECIFIED LATERALITY: ICD-10-CM

## 2024-09-23 DIAGNOSIS — M17.11 PRIMARY OSTEOARTHRITIS OF RIGHT KNEE: Primary | ICD-10-CM

## 2024-09-23 DIAGNOSIS — Z96.652 STATUS POST TOTAL KNEE REPLACEMENT, LEFT: ICD-10-CM

## 2024-09-23 DIAGNOSIS — M25.561 CHRONIC PAIN OF RIGHT KNEE: ICD-10-CM

## 2024-09-23 DIAGNOSIS — G89.29 CHRONIC PAIN OF RIGHT KNEE: ICD-10-CM

## 2024-09-23 DIAGNOSIS — R26.89 ANTALGIC GAIT: ICD-10-CM

## 2024-09-23 PROCEDURE — 3078F DIAST BP <80 MM HG: CPT | Mod: CPTII,S$GLB,, | Performed by: FAMILY MEDICINE

## 2024-09-23 PROCEDURE — 3288F FALL RISK ASSESSMENT DOCD: CPT | Mod: CPTII,S$GLB,, | Performed by: FAMILY MEDICINE

## 2024-09-23 PROCEDURE — 99214 OFFICE O/P EST MOD 30 MIN: CPT | Mod: 25,S$GLB,, | Performed by: FAMILY MEDICINE

## 2024-09-23 PROCEDURE — 73564 X-RAY EXAM KNEE 4 OR MORE: CPT | Mod: 26,50,, | Performed by: RADIOLOGY

## 2024-09-23 PROCEDURE — 1160F RVW MEDS BY RX/DR IN RCRD: CPT | Mod: CPTII,S$GLB,, | Performed by: FAMILY MEDICINE

## 2024-09-23 PROCEDURE — 73564 X-RAY EXAM KNEE 4 OR MORE: CPT | Mod: TC,50

## 2024-09-23 PROCEDURE — 1101F PT FALLS ASSESS-DOCD LE1/YR: CPT | Mod: CPTII,S$GLB,, | Performed by: FAMILY MEDICINE

## 2024-09-23 PROCEDURE — 3008F BODY MASS INDEX DOCD: CPT | Mod: CPTII,S$GLB,, | Performed by: FAMILY MEDICINE

## 2024-09-23 PROCEDURE — 20611 DRAIN/INJ JOINT/BURSA W/US: CPT | Mod: RT,S$GLB,, | Performed by: FAMILY MEDICINE

## 2024-09-23 PROCEDURE — 3074F SYST BP LT 130 MM HG: CPT | Mod: CPTII,S$GLB,, | Performed by: FAMILY MEDICINE

## 2024-09-23 PROCEDURE — 1125F AMNT PAIN NOTED PAIN PRSNT: CPT | Mod: CPTII,S$GLB,, | Performed by: FAMILY MEDICINE

## 2024-09-23 PROCEDURE — 1159F MED LIST DOCD IN RCRD: CPT | Mod: CPTII,S$GLB,, | Performed by: FAMILY MEDICINE

## 2024-09-23 PROCEDURE — 99999 PR PBB SHADOW E&M-EST. PATIENT-LVL III: CPT | Mod: PBBFAC,,, | Performed by: FAMILY MEDICINE

## 2024-09-23 RX ORDER — TRIAMCINOLONE ACETONIDE 40 MG/ML
40 INJECTION, SUSPENSION INTRA-ARTICULAR; INTRAMUSCULAR
Status: DISCONTINUED | OUTPATIENT
Start: 2024-09-23 | End: 2024-09-23 | Stop reason: HOSPADM

## 2024-09-23 RX ORDER — GABAPENTIN 100 MG/1
100 CAPSULE ORAL 3 TIMES DAILY
COMMUNITY

## 2024-09-23 RX ADMIN — TRIAMCINOLONE ACETONIDE 40 MG: 40 INJECTION, SUSPENSION INTRA-ARTICULAR; INTRAMUSCULAR at 11:09

## 2024-09-23 NOTE — PROCEDURES
"Large Joint Aspiration/Injection: R knee    Date/Time: 9/23/2024 11:00 AM    Performed by: Kendrick Freire MD  Authorized by: Kendrick Freire MD    Consent Done?:  Yes (Verbal)  Indications:  Pain  Site marked: the procedure site was marked    Timeout: prior to procedure the correct patient, procedure, and site was verified    Prep: patient was prepped and draped in usual sterile fashion      Details:  Needle Size:  25 G  Ultrasonic Guidance for needle placement?: Yes    Images are saved and documented.  Approach:  Lateral  Location:  Knee  Site:  R knee  Medications:  40 mg triamcinolone acetonide 40 mg/mL  Patient tolerance:  Patient tolerated the procedure well with no immediate complications     Description of ultrasound utilization for needle guidance:   Ultrasound guidance used for needle localization. Images saved and stored for documentation. The SUPRAPATELLAR BURSA / KNEE JOINT was visualized. Dynamic visualization of the 25g x 1.5" needle was continuous throughout the procedure.     "

## 2024-10-25 NOTE — PROGRESS NOTES
HISTORY OF PRESENT ILLNESS   Sagrario Freire, a 74 y.o. female, presents today for evaluation of her right SHOULDER. Patient is xxx hand dominant.  History of Present Illness (HPI)  #1  Location: lat shoulder, right  Onset duration: insidious  Palliative modifying factors: rel rest, OTC analgesics  Provocative modifying factors: GH ABduction  Prior: none  Progression: plateau discomfort  Quality: sharp pain  Radiation [associated signs and symptoms]: none  Severity: per nursing documentation  Timing: intermittent w/ use  Trauma:   AAFP/FPM: Pocket Guide Documentation Guidelines, (c)2014    (54566=9+, 77680=0+)    Review of systems (ROS):  A 10+ review of systems was performed with pertinent positives and negatives noted above in the history of present illness. Other systems were negative unless otherwise specified.    PHYSICAL EXAMINATION  General:  The patient is alert and oriented x 3. Mood is pleasant. Observation of ears, eyes and nose reveal no gross abnormalities. HEENT: NCAT, sclera anicteric. Lungs: Respirations are equal and unlabored.     SHOULDER EXAMINATION     OBSERVATION:     Swelling  none  Deformity  none   Discoloration  none   Scapular winging none   Scars   none  Atrophy  none    TENDERNESS / CREPITUS (T/C):          T/C      T/C   Clavicle   -/-  SUPRAspinatus    -/-     AC Jt.    -/-  INFRAspinatus  -/-    SC Jt.    -/-  Deltoid    -/-      G. Tuberosity  -/-  LH BICEP groove  -/-   Acromion:  -/-  Midline Neck   -/-     Scapular Spine -/-  Trapezium   -/-   SMA Scapula  -/-  GH jt. line - post  -/-     Scapulothoracic  -/-         ROM:     Right shoulder   Left shoulder        AROM (PROM)   AROM (PROM)   FE    170° (175°)     170° (175°)     ER at 0°    60°  (65°)    60°  (65°)   ER at 90° ABD  90°  (90°)    90°  (90°)   IR at 90°  ABD   NA  (40°)     NA  (40°)      IR (spine level)   T10     T10    STRENGTH: (* = with pain) RIGHT SHOULDER  LEFT  SHOULDER   SCAPTION   5/5    5/5    IR    5/5    5/5   ER    5/5    5/5   BICEPS   5/5    5/5   Deltoid    5/5    5/5     SIGNS:  Painful side       NEER   -   OKELLIES        -    DIANA   -   SPEEDS        -   DROP ARM   -   BELLY PRESS       -    X-Body ADD    -   LIFT-OFF        -   HORNBLOWERS      -              STABILITY TESTING   RIGHT SHOULDER  LEFT SHOULDER     Translation     Anterior up face    up face    Posterior up face   up face    Sulcus  < 10mm   < 10 mm     Signs   Apprehension   neg     neg       Relocation   no change    no change      Jerk test  neg    neg    EXTREMITY NEURO-VASCULAR EXAM    Sensation grossly intact to light touch all dermatomal regions.    DTR 2+ Biceps, Triceps, BR and Negative Andriys sign   Grossly intact motor function at Elbow, Wrist and Hand   Distal pulses radial and ulnar 2+, brisk cap refill, symmetric.      NECK:  Painless FROM and spinous processes non-tender. Negative Spurlings sign.       Other Findings:    ASSESSMENT & PLAN   Assessment  #1 Osteoarthritis of glenohumeral joint, right, dominant   W/ supraspinatus tendinosis    No evidence of neurologic pathology  No evidence of vascular pathology    Imaging studies reviewed:   X-ray shoulder, NOT OBTAINED    Plan    We discussed options including    Watchful waiting / relative rest    Physical therapy X    Injection therapy CSI iagh right  CSI supra spin tend right   Consultation    The patient chooses As above   x = prescribed  CSI = corticosteroid injection  VSI = viscosupplement injection  PRPI = platelet rich plasma injection  ia = intra articular  R = right  L = left  B = bilateral   nfSx = surgical consultation was recommended, but patient is not interested in consultation at this time    Physical Therapy        Formal (fPT), @ [facility] facility [facility]    Formal (fPT), @ [facility]        Homegoing (hgPT), per concurrent fPT recommendations    Homegoing (hgPT), per prior fPT recommendations    Homegoing  (hgPT), handout provided        w/  (atPT)    [blank] = not prescribed  x = prescribed  b = prescribed, and begin as indicated  t = continue as indicated  r = prescribed, and restart as indicated  p = completed prior as indicated  hs = prescribed, and with high school   col = prescribed, and with college or university   nfPT = physical therapy was recommended, but patient is not interested in PT at this time    Activity (e.g. sports, work) restrictions    [blank] = as tolerated  pt = per physical therapist  at = per   NWB = non weight bearing on affected lower extremity, with crutches assistance for ambulation    Bracing    [blank] = not prescribed  r = recommended, but not fit with at todays visit  f = prescribed and fit with at todays visit  t = continue as indicated  d = d/c  p = as needed  rare = use on rare, as-needed basis; advised against chronic use    Pain management    [blank] = No prescription necessary. A handout detailing dosing of appropriate   over-the-counter musculoskeletal analgesics was made available to the patient.   m = meloxicam x 14 days  mp = 14 day course of meloxicam prescribed prior    Follow up 12 weeks    [blank] = as needed  [number] = in [number] weeks  CSI = for corticosteroid injection  VSI = for viscosupplement injection or injection series  PRP = for platelet rich plasma injection or injection series  MRI = after MRI imaging  ns = should surgical options be deferred (no surgery)  o = appointment offered, deferred by patient    Should symptoms worsen or fail to resolve, consider    Revisiting the above options and / or MRI     Vocation:

## 2024-11-05 ENCOUNTER — OFFICE VISIT (OUTPATIENT)
Dept: SPORTS MEDICINE | Facility: CLINIC | Age: 74
End: 2024-11-05
Payer: MEDICARE

## 2024-11-05 VITALS
HEART RATE: 89 BPM | HEIGHT: 66 IN | BODY MASS INDEX: 22.02 KG/M2 | DIASTOLIC BLOOD PRESSURE: 70 MMHG | WEIGHT: 137 LBS | SYSTOLIC BLOOD PRESSURE: 111 MMHG

## 2024-11-05 DIAGNOSIS — M25.511 CHRONIC RIGHT SHOULDER PAIN: Primary | ICD-10-CM

## 2024-11-05 DIAGNOSIS — M19.011 OSTEOARTHRITIS OF RIGHT GLENOHUMERAL JOINT: ICD-10-CM

## 2024-11-05 DIAGNOSIS — M25.511 RIGHT SHOULDER PAIN, UNSPECIFIED CHRONICITY: ICD-10-CM

## 2024-11-05 DIAGNOSIS — M67.912 BILATERAL ROTATOR CUFF DYSFUNCTION: ICD-10-CM

## 2024-11-05 DIAGNOSIS — M67.80 TENDINOSIS: ICD-10-CM

## 2024-11-05 DIAGNOSIS — M67.911 BILATERAL ROTATOR CUFF DYSFUNCTION: ICD-10-CM

## 2024-11-05 DIAGNOSIS — M67.911 DYSFUNCTION OF RIGHT ROTATOR CUFF: ICD-10-CM

## 2024-11-05 DIAGNOSIS — S46.819S SPRAIN OF SUPRASPINATUS MUSCLE OR TENDON, UNSPECIFIED LATERALITY, SEQUELA: ICD-10-CM

## 2024-11-05 DIAGNOSIS — G89.29 CHRONIC RIGHT SHOULDER PAIN: Primary | ICD-10-CM

## 2024-11-05 PROCEDURE — 3288F FALL RISK ASSESSMENT DOCD: CPT | Mod: CPTII,S$GLB,, | Performed by: FAMILY MEDICINE

## 2024-11-05 PROCEDURE — 3078F DIAST BP <80 MM HG: CPT | Mod: CPTII,S$GLB,, | Performed by: FAMILY MEDICINE

## 2024-11-05 PROCEDURE — 1160F RVW MEDS BY RX/DR IN RCRD: CPT | Mod: CPTII,S$GLB,, | Performed by: FAMILY MEDICINE

## 2024-11-05 PROCEDURE — 99214 OFFICE O/P EST MOD 30 MIN: CPT | Mod: 25,S$GLB,, | Performed by: FAMILY MEDICINE

## 2024-11-05 PROCEDURE — 1101F PT FALLS ASSESS-DOCD LE1/YR: CPT | Mod: CPTII,S$GLB,, | Performed by: FAMILY MEDICINE

## 2024-11-05 PROCEDURE — 20611 DRAIN/INJ JOINT/BURSA W/US: CPT | Mod: RT,S$GLB,, | Performed by: FAMILY MEDICINE

## 2024-11-05 PROCEDURE — 99999 PR PBB SHADOW E&M-EST. PATIENT-LVL IV: CPT | Mod: PBBFAC,,, | Performed by: FAMILY MEDICINE

## 2024-11-05 PROCEDURE — 1159F MED LIST DOCD IN RCRD: CPT | Mod: CPTII,S$GLB,, | Performed by: FAMILY MEDICINE

## 2024-11-05 PROCEDURE — 3008F BODY MASS INDEX DOCD: CPT | Mod: CPTII,S$GLB,, | Performed by: FAMILY MEDICINE

## 2024-11-05 PROCEDURE — 1126F AMNT PAIN NOTED NONE PRSNT: CPT | Mod: CPTII,S$GLB,, | Performed by: FAMILY MEDICINE

## 2024-11-05 PROCEDURE — 3074F SYST BP LT 130 MM HG: CPT | Mod: CPTII,S$GLB,, | Performed by: FAMILY MEDICINE

## 2024-11-05 RX ORDER — TRIAMCINOLONE ACETONIDE 40 MG/ML
40 INJECTION, SUSPENSION INTRA-ARTICULAR; INTRAMUSCULAR
Status: DISCONTINUED | OUTPATIENT
Start: 2024-11-05 | End: 2024-11-05 | Stop reason: HOSPADM

## 2024-11-05 RX ADMIN — TRIAMCINOLONE ACETONIDE 40 MG: 40 INJECTION, SUSPENSION INTRA-ARTICULAR; INTRAMUSCULAR at 11:11

## 2024-11-05 NOTE — PROCEDURES
"Large Joint Aspiration/Injection: R glenohumeral    Date/Time: 11/5/2024 11:30 AM    Performed by: Kendrick Freire MD  Authorized by: Kendrick Freire MD    Consent Done?:  Yes (Verbal)  Indications:  Pain  Site marked: the procedure site was marked    Timeout: prior to procedure the correct patient, procedure, and site was verified    Prep: patient was prepped and draped in usual sterile fashion      Details:  Needle Size:  22 G  Ultrasonic Guidance for needle placement?: Yes    Images are saved and documented.  Approach:  Posterior  Location:  Shoulder  Site:  R glenohumeral  Medications:  40 mg triamcinolone acetonide 40 mg/mL  Patient tolerance:  Patient tolerated the procedure well with no immediate complications     Description of ultrasound utilization for needle guidance:   Ultrasound guidance used for needle localization. Images saved and stored for documentation. The glenohumeral joint was visualized. Dynamic visualization of the 22g x 3.5" needle was continuous throughout the procedure.     "

## 2024-11-05 NOTE — PROCEDURES
"Tendon Origin    Date/Time: 11/5/2024 11:30 AM    Performed by: Kendrick Freire MD  Authorized by: Kendrick Freire MD    Consent Done?:  Yes (Verbal)  Timeout: prior to procedure the correct patient, procedure, and site was verified    Indications:  Pain  Site marked: the procedure site was marked    Timeout: prior to procedure the correct patient, procedure, and site was verified    Location: shoulder.  Prep: patient was prepped and draped in usual sterile fashion    Ultrasonic Guidance for Needle Placement?: Yes    Needle size:  25 G  Approach:  Posterolateral  Medications:  40 mg triamcinolone acetonide 40 mg/mL  Patient tolerance:  Patient tolerated the procedure well with no immediate complications   Supraspinatus tendon and tendon insertion injection RIGHT    Description of ultrasound utilization for needle guidance:   Ultrasound guidance used for needle localization. Images saved and stored for documentation. The supraspinatus muscle, myotendinous junction, and tendon insertion on the greater tuberosity of the humeral head were visualized. Dynamic visualization of the 22g x 1.5" needle was continuous throughout the procedure.    "

## 2025-01-09 ENCOUNTER — TELEPHONE (OUTPATIENT)
Dept: SPORTS MEDICINE | Facility: CLINIC | Age: 75
End: 2025-01-09
Payer: MEDICARE

## 2025-01-09 NOTE — TELEPHONE ENCOUNTER
Scheduled for 1/23 per patient request for R knee CSI. Patient confirmed appt change. Cancelled appt for 1/29 for shoulder per patient request.    Gita Armendariz MS, [facility]     Sports Medicine Assistant to:  Kendrick Freire M.D.  System Director, [facility]  Head Medical Team Physician, [facility]  President-Elect, [facility]  Chief Medical Officer, [place]      ----- Message from [employer] sent at 1/9/2025 12:42 PM CST -----  Regarding: Appt  Contact: pt [phone]  Pt is requesting call back to discuss plan of care pt would like to schedule appt for right knee injection, please call pt @ [phone]

## 2025-01-22 ENCOUNTER — TELEPHONE (OUTPATIENT)
Dept: SPORTS MEDICINE | Facility: CLINIC | Age: 75
End: 2025-01-22
Payer: MEDICARE

## 2025-01-22 NOTE — TELEPHONE ENCOUNTER
Called and left voicemail for patient that due to the weather on 1/23/25, appointment was cancelled and patient needs to contact our office at [phone] to reschedule.

## 2025-04-08 ENCOUNTER — TELEPHONE (OUTPATIENT)
Dept: SPORTS MEDICINE | Facility: CLINIC | Age: 75
End: 2025-04-08

## 2025-04-08 ENCOUNTER — TELEPHONE (OUTPATIENT)
Dept: SPORTS MEDICINE | Facility: CLINIC | Age: 75
End: 2025-04-08
Payer: MEDICARE

## 2025-04-08 ENCOUNTER — OFFICE VISIT (OUTPATIENT)
Dept: SPORTS MEDICINE | Facility: CLINIC | Age: 75
End: 2025-04-08
Payer: MEDICARE

## 2025-04-08 ENCOUNTER — HOSPITAL ENCOUNTER (OUTPATIENT)
Dept: RADIOLOGY | Facility: HOSPITAL | Age: 75
Discharge: HOME | End: 2025-04-08
Attending: ORTHOPAEDIC SURGERY
Payer: MEDICARE

## 2025-04-08 VITALS
HEART RATE: 88 BPM | DIASTOLIC BLOOD PRESSURE: 64 MMHG | WEIGHT: 142 LBS | HEIGHT: 66 IN | SYSTOLIC BLOOD PRESSURE: 106 MMHG | BODY MASS INDEX: 22.82 KG/M2

## 2025-04-08 DIAGNOSIS — M17.11 PRIMARY OSTEOARTHRITIS OF RIGHT KNEE: Primary | ICD-10-CM

## 2025-04-08 DIAGNOSIS — G89.29 CHRONIC PAIN OF RIGHT KNEE: ICD-10-CM

## 2025-04-08 DIAGNOSIS — E11.9 DIABETES: ICD-10-CM

## 2025-04-08 DIAGNOSIS — M25.561 RIGHT KNEE PAIN, UNSPECIFIED CHRONICITY: ICD-10-CM

## 2025-04-08 DIAGNOSIS — M25.561 CHRONIC PAIN OF RIGHT KNEE: ICD-10-CM

## 2025-04-08 PROCEDURE — 99215 OFFICE O/P EST HI 40 MIN: CPT | Mod: S$GLB,,, | Performed by: ORTHOPAEDIC SURGERY

## 2025-04-08 PROCEDURE — 3288F FALL RISK ASSESSMENT DOCD: CPT | Mod: CPTII,S$GLB,, | Performed by: ORTHOPAEDIC SURGERY

## 2025-04-08 PROCEDURE — 3078F DIAST BP <80 MM HG: CPT | Mod: CPTII,S$GLB,, | Performed by: ORTHOPAEDIC SURGERY

## 2025-04-08 PROCEDURE — 3074F SYST BP LT 130 MM HG: CPT | Mod: CPTII,S$GLB,, | Performed by: ORTHOPAEDIC SURGERY

## 2025-04-08 PROCEDURE — 73564 X-RAY EXAM KNEE 4 OR MORE: CPT | Mod: TC,50

## 2025-04-08 PROCEDURE — 73564 X-RAY EXAM KNEE 4 OR MORE: CPT | Mod: 26,50,, | Performed by: RADIOLOGY

## 2025-04-08 PROCEDURE — 1125F AMNT PAIN NOTED PAIN PRSNT: CPT | Mod: CPTII,S$GLB,, | Performed by: ORTHOPAEDIC SURGERY

## 2025-04-08 PROCEDURE — 3008F BODY MASS INDEX DOCD: CPT | Mod: CPTII,S$GLB,, | Performed by: ORTHOPAEDIC SURGERY

## 2025-04-08 PROCEDURE — 1159F MED LIST DOCD IN RCRD: CPT | Mod: CPTII,S$GLB,, | Performed by: ORTHOPAEDIC SURGERY

## 2025-04-08 PROCEDURE — 1101F PT FALLS ASSESS-DOCD LE1/YR: CPT | Mod: CPTII,S$GLB,, | Performed by: ORTHOPAEDIC SURGERY

## 2025-04-08 PROCEDURE — 99999 PR PBB SHADOW E&M-EST. PATIENT-LVL IV: CPT | Mod: PBBFAC,,, | Performed by: ORTHOPAEDIC SURGERY

## 2025-04-08 RX ORDER — TIRZEPATIDE 5 MG/.5ML
INJECTION, SOLUTION SUBCUTANEOUS
COMMUNITY

## 2025-04-08 NOTE — PROGRESS NOTES
CC: Right knee pain     DATE OF PROCEDURE: 4/14/2021   PROCEDURES PERFORMED:   Left total knee arthroplasty      Sagrario Freire, presents today for follow up appointment of her right knee. She was seen most recently 09/23/24 by Kendrick Freire MD and received CSI.  She comes into clinic today wanting to schedule a right total knee arthroplasty.  She reports significant daily pain related to knee DJD.  Prior conservative treatment has been extensive.  She has done very well with her previous left total knee arthroplasty.  She does have some mild intermittent symptoms on that side but nothing too significant.  Her right knee pain is daily and at times prevents her from simply walking.  She denies any ipsilateral groin or hip pain.  Typical knee pain is localized medially and anterior/retropatellar.  Treatment has included multiple injections, prior formal therapy, oral medication.    Prior Hx 10/26/2023:   Sagrario returns back to clinic for follow up of left knee doing well just over 2 years 6 months s/p the above mentioned procedure.  States generally speaking the knee is much better compared to preop.  That has been the case since surgery for the most part.  She is had some intermittent soreness in the knee with mild pain particularly at the end of the day.  Unfortunately that has worsened a bit recently.  She was hospital briefly for a kidney stone and developed by her account sepsis.  This was treated with antibiotics.  She underwent a surgical procedure for the kidney stone.  Since then the knee has felt worse.  Pain notable mostly at night.  States that she does not notice the pain as much during the day.  Localizes over the anterior/retropatellar region.  Not taking any anti-inflammatory medication or oral medication for the knee at this time.  No ipsilateral groin or hip pain.  No back or radicular symptoms.  States the right knee as tolerable.  Has known advanced DJD on that side.    Prior Hx  "12/7/21:  Sagrario Tani reports to be doing well just under 5 months s/p the above mentioned procedure. Diagnostic ultrasound from 5/20/21 was positive for DVT. She is no longer taking Eliquis 5 mg BID. She has mild pain intermittently, but this is much better comparison to pre op. Going to PT 1-2xWeek at the [place] location. Seeing good progress daily with the knee otherwise. Overall, doing well and pleased with her outcome after surgery.     Patient has been having pain in her right knee recently. She has known history of advanced osteoarthritis. Pain is located over the anterior knee. Pain with walking, standing, sitting for long periods of time and using the stairs. Previous CSI on 8/19/21 from Dr. Freire which provided no relief. Would like to discuss treatment options today.    REVIEW OF SYSTEMS:   Constitution: Negative. Negative for chills, fever and night sweats.    Hematologic/Lymphatic: Negative for bleeding problem. Does not bruise/bleed easily.   Skin: Negative for dry skin, itching and rash.   Musculoskeletal: Negative for falls. Positive for right knee pain and muscle weakness.     All other review of symptoms were reviewed and found to be noncontributory.     PAST MEDICAL HISTORY:   Past Medical History:   Diagnosis Date    Abnormal ECG     Frequently monitored by primary per pt    Diabetes mellitus, type 2     GERD (gastroesophageal reflux disease)     Hypothyroidism      PAST SURGICAL HISTORY:   Past Surgical History:   Procedure Laterality Date    ABDOMINAL HERNIA REPAIR      BELT ABDOMINOPLASTY      COLECTOMY      partial     hand operation Right     "scraped the tendon"    TOTAL KNEE ARTHROPLASTY Left 4/14/2021    Procedure: ARTHROPLASTY, KNEE, TOTAL;  Surgeon: SANTO Hector MD;  Location: [facility];  Service: Orthopedics;  Laterality: Left;  outpatient with 23hr observation  Regional with catheter- spinal & adductor  pericapsular injection: 30cc VITO injection     FAMILY HISTORY: "   Family History   Problem Relation Name Age of Onset    Heart disease Mother      COPD Mother      Diabetes Mother      Heart disease Father      Lymphoma Father       SOCIAL HISTORY:   Social History     Socioeconomic History    Marital status:      Spouse name: Armin    Number of children: 3   Occupational History     Comment:    Tobacco Use    Smoking status: Never   Substance and Sexual Activity    Alcohol use: Not Currently    Drug use: Never    Sexual activity: Yes   Social History Narrative    Lives in house with no steps     MEDICATIONS:     Current Outpatient Medications:     atorvastatin (LIPITOR) 10 MG tablet, Take 10 mg by mouth once daily. Take as scheduled, Disp: , Rfl:     celecoxib (CELEBREX) 200 MG capsule, Take 1 capsule (200 mg total) by mouth once daily., Disp: 30 capsule, Rfl: 1    cephALEXin (KEFLEX) 500 MG capsule, Take 1 capsule (500 mg total) by mouth 4 (four) times daily. Take 4 capsules prior to dental procedure., Disp: 4 capsule, Rfl: 2    dulaglutide (TRULICITY) 1.5 mg/0.5 mL PnIj, Inject 3 mg into the skin. Take as scheduled, Disp: , Rfl:     famotidine (PEPCID) 20 MG tablet, Take 20 mg by mouth once. Take as scheduled, Disp: , Rfl:     gabapentin (NEURONTIN) 100 MG capsule, Take 100 mg by mouth 3 (three) times daily., Disp: , Rfl:     glimepiride (AMARYL) 2 MG tablet, Take 2 mg by mouth every 12 (twelve) hours. Hold morning of surgery, Disp: , Rfl:     insulin NPH-insulin regular, 70/30, (NOVOLIN 70/30) 100 unit/mL (70-30) injection, Inject 10 Units into the skin 2 (two) times daily. Take 100% pm before surgery=10 units Take 30% am of surgery=3 units, Disp: , Rfl:     levothyroxine (SYNTHROID) 50 MCG tablet, Take 50 mcg by mouth once daily. Take as scheduled, Disp: , Rfl:     metFORMIN (GLUCOPHAGE) 1000 MG tablet, Take 1,000 mg by mouth 2 (two) times daily with meals. Hold night before surgery and morning of surgery, Disp: , Rfl:     methylPREDNISolone (MEDROL  "DOSEPACK) 4 mg tablet, use as directed, Disp: 1 each, Rfl: 0    tirzepatide (MOUNJARO) 5 mg/0.5 mL PnIj, Inject into the skin every 7 days., Disp: , Rfl:     zolpidem (AMBIEN) 10 mg Tab, Take 10 mg by mouth once daily. Take as scheduled, Disp: , Rfl: 0    ALPRAZolam (XANAX) 0.5 MG tablet, Take 0.5 mg by mouth every 12 (twelve) hours. Take as scheduled (Patient not taking: Reported on 9/23/2024), Disp: , Rfl: 0    aspirin (ECOTRIN) 81 MG EC tablet, Take 1 tablet (81 mg total) by mouth 2 (two) times daily. (Patient taking differently: Take 81 mg by mouth once. Ok to take as scheduled), Disp: 56 tablet, Rfl: 0    pregabalin (LYRICA) 75 MG capsule, Take 1 capsule (75 mg total) by mouth 2 (two) times daily. for 7 days, Disp: 14 capsule, Rfl: 0  No current facility-administered medications for this visit.    Facility-Administered Medications Ordered in Other Visits:     fentaNYL 50 mcg/mL injection 25 mcg, 25 mcg, Intravenous, Q5 Min PRN, Chema Burt MD, 50 mcg at 04/14/21 1305    midazolam (VERSED) 1 mg/mL injection 0.5 mg, 0.5 mg, Intravenous, PRN, Chema Burt MD, 2 mg at 04/14/21 1300    ropivacaine 0.2% [facility] PainPRO Pump infusion 500 ML, , Perineural, Continuous, Chema Burt MD, New Bag at 04/14/21 1810    ALLERGIES:   Review of patient's allergies indicates:  No Known Allergies     PHYSICAL EXAMINATION:  /64   Pulse 88   Ht 5' 6" (1.676 m)   Wt 64.4 kg (142 lb)   BMI 22.92 kg/m²   General: Well-developed well-nourished 74 y.o. femalein no acute distress   Cardiovascular: Regular rhythm by palpation of distal pulse, normal color and temperature, no concerning varicosities on symptomatic side   Lungs: No labored breathing or wheezing appreciated   Neuro: Alert and oriented ×3   Psychiatric: well oriented to person, place and time, demonstrates normal mood and affect   Skin: No rashes, lesions or ulcers, normal temperature, turgor, and texture on involved " extremity.    Ortho/SPM Exam  Examination of right knee demonstrates intact extensor mechanism.  Mild soft tissue swelling.  No significant effusion.  5-10 degree loss of terminal extension.  Active assisted flexion to 100°.  Positive patellar crepitus and grind.  Pain to palpation over the medial joint line.  Pain medially with varus load.  Ligamentously stable.  No pain with passive internal and external rotation of the right hip.  She has a small area of healing abrasion over the lower leg where she hit her leg up against the table.  Otherwise no significant peripheral vascular disease.  2+ dorsalis pedis pulse.    Examination of the left knee demonstrates a well-healed prior incision.  Absolutely no swelling.  No warmth or erythema.  No signs of inflammation or infection.  Intact extensor mechanism.  Able to extend the knee to near full extension.  Good quad activation and strength.  Active flexion with ease to 125°.  No apparent pain.  Central patellar tracking.  Stable to varus and valgus stress.  No mid flexion instability.  No significant point areas of tenderness or pain.    IMAGING:  X-rays of bilateral knee including standing, weight bearing AP and flexion knees, lateral and sunrise views ordered and images reviewed by me show:    Right knee advanced DJD, tricompartmental with bone-on-bone articulation medially. KL4.  Stable left total knee prosthesis.  No signs of loosening or complication otherwise seen.    ASSESSMENT:      ICD-10-CM ICD-9-CM   1. Primary osteoarthritis of right knee  M17.11 715.16   2. Chronic pain of right knee  M25.561 719.46    G89.29 338.29   3. Diabetes  E11.9 250.00       PLAN:     Findings discussed with the patient.    She would like to proceed with right total knee arthroplasty.  The details of that were discussed as before.  The patient understands the inherent risks of surgery which include but are not limited to continued or recurrent pain and upwards of 30% of patients,  infection, DVT/PE, fracture, aseptic loosening, stiffness among others.  Higher risk for infection and poor wound healing given her diabetic status.  We will check a A1c level today.  Optimal goal of 7.7 or less.  Her glucose monitor today document 7.6.  We will get that ordered.      Patient's specific factors taken into account in the [employer] system would be best fitting in this case in my opinion.     She would need preoperative medical clearance through the preop center.  She denies any dental issues.     Plan for RIGHT total knee arthroplasty with [facility] system.  Universal base plate tibia.  Antibiotic cement.  Non X 3 components.    Full-length standing x-rays at orthopedic preop appointment.    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  Off anticoagulation now.  The plan post right total knee replacement would be Eliquis 2.5 mg twice a day for 12 weeks.     Informed Consent:     The details of the surgical procedure were explained, including the location of probable incisions and a description of possible hardware and/or grafts to be used. Alternatives to both operative and non-operative options with associated risks and benefits were discussed. The patient understands the likely convalescence after surgery and, in particular, the expected postop rehab and recovery course. The outlined risks and potential complications of the proposed procedure include but are not limited to: infection, poor wound healing, scarring, deformity, stiffness, swelling, continued or recurrent pain, instability, hardware or prosthetic failure if implanted, symptomatic hardware requiring removal, weakness, neurovascular injury, numbness, chronic regional pain disorder, tissue nonhealing/irreparability/retear, subsequent contralateral limb injury or pathology, chondral injury, arthritis, fracture, blood clot formation, inability to return to previous level of activity,  anesthetic or regional block complication up to death, need for additional procedure as indicated intraoperatively, and potential need for further surgery.     The patient was also informed and understands that the risks of surgery are greater for patients with a current condition or history of heart disease, obesity, clotting disorders, recurrent infections, steroid use, current or past smoking, and factors such as sedentary lifestyle and noncompliance with medications, therapy or follow-up. The degree of the increased risk is hard to estimate with any degree of precision. If applicable, smoking cessation was discussed.      All questions were answered. The patient has verbalized understanding of these issues and wishes to proceed with the surgery as discussed.

## 2025-04-08 NOTE — TELEPHONE ENCOUNTER
----- Message from Harriet sent at 4/8/2025 11:45 AM CDT -----  Regarding: Right Knee  Contact: [phone]  Pt is calling in ref to a text message asking which knee she is scheduled  for on today. Pt says it is her right knee. Patient Requesting Call Back @  [phone]

## 2025-04-08 NOTE — TELEPHONE ENCOUNTER
Rs pt pre op appt    ----- Message from Rohini sent at 4/8/2025  3:55 PM CDT -----  Pt Requesting to Reschedule an Appointment Pt is requesting to Reschedule an appointment that our scheduling dept cannot Reschedule.Who called: ptInitial appt date: 4/16/25When pt wants appt: any date except 4/22/25 and 4/23/25Reason: pt said she have another appt at that timeBest call back #:  [phone] notes:

## 2025-04-09 ENCOUNTER — LAB VISIT (OUTPATIENT)
Dept: LAB | Facility: HOSPITAL | Age: 75
End: 2025-04-09
Payer: MEDICARE

## 2025-04-09 DIAGNOSIS — Z01.818 PRE-OP TESTING: ICD-10-CM

## 2025-04-09 DIAGNOSIS — M79.609 PAIN IN EXTREMITY, UNSPECIFIED EXTREMITY: ICD-10-CM

## 2025-04-09 DIAGNOSIS — E03.9 HYPOTHYROIDISM, UNSPECIFIED TYPE: ICD-10-CM

## 2025-04-09 DIAGNOSIS — E11.9 DIABETES: ICD-10-CM

## 2025-04-09 LAB
ABSOLUTE EOSINOPHIL (OHS): 0.11 K/UL
ABSOLUTE MONOCYTE (OHS): 0.63 K/UL (ref 0.3–1)
ABSOLUTE NEUTROPHIL COUNT (OHS): 3.44 K/UL (ref 1.8–7.7)
ALBUMIN SERPL BCP-MCNC: 4.1 G/DL (ref 3.5–5.2)
ALP SERPL-CCNC: 53 UNIT/L (ref 40–150)
ALT SERPL W/O P-5'-P-CCNC: 13 UNIT/L (ref 10–44)
ANION GAP (OHS): 13 MMOL/L (ref 8–16)
AST SERPL-CCNC: 17 UNIT/L (ref 11–45)
BASOPHILS # BLD AUTO: 0.05 K/UL
BASOPHILS NFR BLD AUTO: 0.8 %
BILIRUB SERPL-MCNC: 0.4 MG/DL (ref 0.1–1)
BUN SERPL-MCNC: 15 MG/DL (ref 8–23)
CALCIUM SERPL-MCNC: 9.3 MG/DL (ref 8.7–10.5)
CHLORIDE SERPL-SCNC: 105 MMOL/L (ref 95–110)
CO2 SERPL-SCNC: 23 MMOL/L (ref 23–29)
CREAT SERPL-MCNC: 1 MG/DL (ref 0.5–1.4)
EAG (OHS): 171 MG/DL (ref 68–131)
ERYTHROCYTE [DISTWIDTH] IN BLOOD BY AUTOMATED COUNT: 12.4 % (ref 11.5–14.5)
GFR SERPLBLD CREATININE-BSD FMLA CKD-EPI: 60 ML/MIN/1.73/M2
GLUCOSE SERPL-MCNC: 147 MG/DL (ref 70–110)
HBA1C MFR BLD: 7.6 % (ref 4–5.6)
HCT VFR BLD AUTO: 40 % (ref 37–48.5)
HGB BLD-MCNC: 13.1 GM/DL (ref 12–16)
IMM GRANULOCYTES # BLD AUTO: 0.01 K/UL (ref 0–0.04)
IMM GRANULOCYTES NFR BLD AUTO: 0.2 % (ref 0–0.5)
INR PPP: 1 (ref 0.8–1.2)
LYMPHOCYTES # BLD AUTO: 2.03 K/UL (ref 1–4.8)
MCH RBC QN AUTO: 30.3 PG (ref 27–31)
MCHC RBC AUTO-ENTMCNC: 32.8 G/DL (ref 32–36)
MCV RBC AUTO: 93 FL (ref 82–98)
NUCLEATED RBC (/100WBC) (OHS): 0 /100 WBC
PLATELET # BLD AUTO: 232 K/UL (ref 150–450)
PMV BLD AUTO: 10.1 FL (ref 9.2–12.9)
POTASSIUM SERPL-SCNC: 4 MMOL/L (ref 3.5–5.1)
PROT SERPL-MCNC: 7.3 GM/DL (ref 6–8.4)
PROTHROMBIN TIME: 11 SECONDS (ref 9–12.5)
RBC # BLD AUTO: 4.32 M/UL (ref 4–5.4)
RELATIVE EOSINOPHIL (OHS): 1.8 %
RELATIVE LYMPHOCYTE (OHS): 32.4 % (ref 18–48)
RELATIVE MONOCYTE (OHS): 10 % (ref 4–15)
RELATIVE NEUTROPHIL (OHS): 54.8 % (ref 38–73)
SODIUM SERPL-SCNC: 141 MMOL/L (ref 136–145)
T4 FREE SERPL-MCNC: 1.04 NG/DL (ref 0.71–1.51)
TSH SERPL-ACNC: 9.33 UIU/ML (ref 0.4–4)
WBC # BLD AUTO: 6.27 K/UL (ref 3.9–12.7)

## 2025-04-09 PROCEDURE — 80053 COMPREHEN METABOLIC PANEL: CPT

## 2025-04-09 PROCEDURE — 83036 HEMOGLOBIN GLYCOSYLATED A1C: CPT

## 2025-04-09 PROCEDURE — 85025 COMPLETE CBC W/AUTO DIFF WBC: CPT

## 2025-04-09 PROCEDURE — 84443 ASSAY THYROID STIM HORMONE: CPT

## 2025-04-09 PROCEDURE — 84439 ASSAY OF FREE THYROXINE: CPT

## 2025-04-09 PROCEDURE — 85610 PROTHROMBIN TIME: CPT

## 2025-04-09 PROCEDURE — 36415 COLL VENOUS BLD VENIPUNCTURE: CPT

## 2025-04-09 NOTE — ANESTHESIA PAT ROS NOTE
2025  Sagrario Freire is a 74 y.o., female with Osteoarthritis Right knee, arrives for preop anesthesia assessment.      Pre-op Assessment    I have reviewed the Patient Summary Reports.     I have reviewed the Nursing Notes. I have reviewed the NPO Status.   I have reviewed the Medications.     Review of Systems  Anesthesia Hx:    PONV  SLOW TO AWAKEN           Denies Family Hx of Anesthesia complications.   Personal Hx of Anesthesia complications, Post-Operative Nausea/Vomiting, with every anesthetic, despite treatment          Slow To Awaken/Delayed Emergence and significant; extubation delayed; prolonged PACU stay          Social:  Non-Smoker, No Alcohol Use        Family History   Mother ()               Heart disease   COPD   Diabetes  Father ()               Heart disease   Lymphoma          Hematology/Oncology:  Hematology Normal   Oncology Normal                                   EENT/Dental:  denies chronic allergic rhinitis           Cardiovascular:     Hypertension, well controlled       Denies Angina.     hyperlipidemia  Denies BRANCH.      Functional Capacity 4 METS                         Pulmonary:  Pulmonary Normal                       Renal/:  Chronic Renal Disease, CKD renal calculi       Kidney Function/Disease, Chronic Kidney Disease (CKD) , CKD Stage III (GFR 30-59)            Hepatic/GI:   Denies PUD.  GERD, well controlled Denies Liver Disease.  Denies Hepatitis.   BELT ABDOMINOPLASTY     Taking GLP-1 Agonists Instructed to Hold for 7 Days           Musculoskeletal:  Arthritis    Musculoskeletal General/Symptoms: joint immobility, joint pain, joint stiffness, limited ROM.    Impaired gait and mobility  Chronic pain of left knee  Decreased range of motion (ROM) of left knee       Joint Disease:  Arthritis, Osteoarthritis Primary osteoarthritis of left knee         Neurological:  Denies TIA.  Denies CVA.    Denies Seizures.          Chronic Pain Syndrome Osteoarthritis   Peripheral Neuropathy                          Endocrine:  Diabetes, well controlled, type 2 Hypothyroidism    Hold 7 days  MOUNJARO  1.5 mg/0.5 mL PnIj  LAST DOSE Sunday 4/20/25        Psych:  Psychiatric Normal                    Physical Exam  General: Well nourished, Cooperative, Alert and Oriented    Airway:  Mallampati: II   Mouth Opening: Normal  Tongue: Normal  Neck ROM: Normal ROM    Dental:  Partial Dentures  Upper partial plate  Chest/Lungs:  Clear to auscultation, Normal Respiratory Rate    Heart:  Rate: Normal  Rhythm: Regular Rhythm  Sounds: Normal          Anesthesia Assessment: Preoperative EQUATION    Planned Procedure: Procedure(s) (LRB):  ARTHROPLASTY, KNEE, TOTAL (Right)  Requested Anesthesia Type:Spinal  Surgeon: SANTO Hector MD  Service: Orthopedics  Known or anticipated Date of Surgery:4/30/2025    Surgeon notes: reviewed    Electronic QUestionnaire Assessment completed via nurse interview with patient.        Triage considerations:     The patient has no apparent active cardiac condition (No unstable coronary Syndrome such as severe unstable angina or recent [<1 month] myocardial infarction, decompensated CHF, severe valvular   disease or significant arrhythmia)    Previous anesthesia records:GETA and CSE   Patient reports PONV and slow to awaken from anesthesia and reports no family history of anesthesia complications.    10/03/23    [facility]  CYSTOSCOPY LITHOTRIPSY WITH LASER URETERAL STONE (Right)   Airway   TM distance: >3 FB  Neck ROM: full    Final airway type: Endotracheal airwaySuccessful airway: Oral ETT  Successful intubation technique: Direct laryngoscopy  Endotracheal tube insertion site: Oral  Blade: Wick  Blade size: #2  Placement verified by: auscultation, CO2 detection, chest rise and direct visualization  Breath Sounds: Equal  Cormack-Lehane Classification: grade I - full view of glottis  ETT size: 7.0mm  ETT to lips (cm): 20  Measured from: Lips  Secured Method:  Silk Tape Number of attempts at approach: 1       04/14/21  ARTHROPLASTY, KNEE, TOTAL (Left: Knee)   Airway/Jaw/Neck:  Airway Findings: Mouth Opening: Normal Tongue: Normal   Epidural Needle  Injection technique: ARCHIE air  Needle type: Tuohy   Needle gauge: 17 G  Needle length: 3.5 in  Needle insertion depth: 5 cm  Location: L4-5  Needle localization: anatomical landmarks   Last PCP note: within 3 months , outside [facility]     Dr. Block   Subspecialty notes: Ortho  Sports Medicine   Dr. Freire    Other important co-morbidities: DM2, GERD, HLD, HTN, and Hypothyroid, Urosepsis 2023 (kidney stone),  CKD3, provoked DVT LLE (post op LTKA 2021), Anxiety (only when worried about son), mild Lumbar Levoscoliosis, Mounjaro use, PONV, +COVID (3/2024)     Tests already available:  Available tests,  outside [facility] , within [facility] .   04/09/25   A1C scheduled    07/15/24   A1C   CMP   Lipid panel  07/29/24   XR Abd  03/25/24   CXR  09/07/23   VAS US Doppler Venous legs bilat                         US Abd       CT Abd  08/16/23   Duplex Ext Vein, Uni  05/20/21  CV US doppler venous DVT Left leg  04/12/21   EKG  04/14/21     (Media)     Echo, Stress test (1/8/18)  04/24/19   XR Scoliosis            Instructions given. (See in Nurse's note)    Optimization:  Anesthesia Preop Clinic Assessment  Indicated Will schedule POC.    Medical Opinion Indicated       Sub-specialist consult indicated:   [facility] Pre op Center Dr. Escoto       Plan:    Testing:  CBC, CMP, EKG, PT/INR, and TSH   Pre-anesthesia  visit       Visit focus: possible regional anesthesia and/or nerve block      Consultation:Patient's PCP for a statement of optimization      Patient  has previously scheduled Medical Appointment: 4/21 4/24         Navigation: Tests Scheduled.              Consults scheduled.             Results will be tracked by Preop Clinic.                   Optimization Pending  Right knee arthroplasty scheduled in 2 weeks lab work has been  reviewed at [facility] she will get an EKG and chest x-ray these will be checked before clearance   Electronically signed by Maldonado Wells MD at 4/21/2025  4:43 PM    4/24/25 Outside  PCP (Dr. Wells)  media

## 2025-04-10 RX ORDER — PANTOPRAZOLE SODIUM 40 MG/1
1 TABLET, DELAYED RELEASE ORAL EVERY MORNING
COMMUNITY
Start: 2025-01-15 | End: 2026-01-15

## 2025-04-10 RX ORDER — PHENYLPROPANOLAMINE/CLEMASTINE 75-1.34MG
TABLET, EXTENDED RELEASE ORAL DAILY PRN
COMMUNITY

## 2025-04-10 RX ORDER — INSULIN GLARGINE 100 [IU]/ML
15 INJECTION, SOLUTION SUBCUTANEOUS
COMMUNITY
Start: 2024-07-17

## 2025-04-10 RX ORDER — INSULIN LISPRO 100 [IU]/ML
INJECTION, SOLUTION INTRAVENOUS; SUBCUTANEOUS
COMMUNITY
Start: 2025-01-13

## 2025-04-10 RX ORDER — ACETAMINOPHEN 500 MG
500 TABLET ORAL
COMMUNITY

## 2025-04-10 NOTE — PRE-PROCEDURE INSTRUCTIONS
Chart review; triage plan initiated.    Spoke to patient regarding upcoming scheduled surgery.  Name, , and allergies verified.  Medical, surgical, and anesthesia history reviewed.  Instructed to hold vitamins, supplements and NSAIDs for one week prior to surgery. Informed that the remaining medication instructions will be provided at the POC visit. Pt verbalized understanding.    Patient explained that she takes Mounjaro on Tuesday.  Instructed patient to hold Mounjaro on .  Patient verbalized understanding and agreed.

## 2025-04-21 ENCOUNTER — OFFICE VISIT (OUTPATIENT)
Dept: SPORTS MEDICINE | Facility: CLINIC | Age: 75
End: 2025-04-21
Payer: MEDICARE

## 2025-04-21 ENCOUNTER — HOSPITAL ENCOUNTER (OUTPATIENT)
Dept: RADIOLOGY | Facility: HOSPITAL | Age: 75
Discharge: HOME | End: 2025-04-21
Attending: PHYSICIAN ASSISTANT
Payer: MEDICARE

## 2025-04-21 VITALS
SYSTOLIC BLOOD PRESSURE: 111 MMHG | DIASTOLIC BLOOD PRESSURE: 72 MMHG | WEIGHT: 142 LBS | HEART RATE: 82 BPM | HEIGHT: 66 IN | BODY MASS INDEX: 22.82 KG/M2

## 2025-04-21 DIAGNOSIS — M25.511 RIGHT SHOULDER PAIN, UNSPECIFIED CHRONICITY: ICD-10-CM

## 2025-04-21 DIAGNOSIS — M19.011 OSTEOARTHRITIS OF RIGHT GLENOHUMERAL JOINT: ICD-10-CM

## 2025-04-21 DIAGNOSIS — S46.819S SPRAIN OF SUPRASPINATUS MUSCLE OR TENDON, UNSPECIFIED LATERALITY, SEQUELA: ICD-10-CM

## 2025-04-21 DIAGNOSIS — M25.562 LEFT KNEE PAIN, UNSPECIFIED CHRONICITY: ICD-10-CM

## 2025-04-21 DIAGNOSIS — M25.511 CHRONIC RIGHT SHOULDER PAIN: Primary | ICD-10-CM

## 2025-04-21 DIAGNOSIS — M17.11 PRIMARY OSTEOARTHRITIS OF RIGHT KNEE: Primary | ICD-10-CM

## 2025-04-21 DIAGNOSIS — G89.29 CHRONIC RIGHT SHOULDER PAIN: Primary | ICD-10-CM

## 2025-04-21 DIAGNOSIS — M67.911 DYSFUNCTION OF RIGHT ROTATOR CUFF: ICD-10-CM

## 2025-04-21 DIAGNOSIS — M67.80 TENDINOSIS: ICD-10-CM

## 2025-04-21 PROCEDURE — 1160F RVW MEDS BY RX/DR IN RCRD: CPT | Mod: CPTII,S$GLB,, | Performed by: FAMILY MEDICINE

## 2025-04-21 PROCEDURE — 1159F MED LIST DOCD IN RCRD: CPT | Mod: CPTII,S$GLB,, | Performed by: FAMILY MEDICINE

## 2025-04-21 PROCEDURE — 3078F DIAST BP <80 MM HG: CPT | Mod: CPTII,S$GLB,, | Performed by: PHYSICIAN ASSISTANT

## 2025-04-21 PROCEDURE — 3288F FALL RISK ASSESSMENT DOCD: CPT | Mod: CPTII,S$GLB,, | Performed by: PHYSICIAN ASSISTANT

## 2025-04-21 PROCEDURE — 99999 PR PBB SHADOW E&M-EST. PATIENT-LVL V: CPT | Mod: PBBFAC,,, | Performed by: PHYSICIAN ASSISTANT

## 2025-04-21 PROCEDURE — 3074F SYST BP LT 130 MM HG: CPT | Mod: CPTII,S$GLB,, | Performed by: PHYSICIAN ASSISTANT

## 2025-04-21 PROCEDURE — 3008F BODY MASS INDEX DOCD: CPT | Mod: CPTII,S$GLB,, | Performed by: PHYSICIAN ASSISTANT

## 2025-04-21 PROCEDURE — 99215 OFFICE O/P EST HI 40 MIN: CPT | Mod: S$GLB,,, | Performed by: PHYSICIAN ASSISTANT

## 2025-04-21 PROCEDURE — 1101F PT FALLS ASSESS-DOCD LE1/YR: CPT | Mod: CPTII,S$GLB,, | Performed by: PHYSICIAN ASSISTANT

## 2025-04-21 PROCEDURE — 1125F AMNT PAIN NOTED PAIN PRSNT: CPT | Mod: CPTII,S$GLB,, | Performed by: PHYSICIAN ASSISTANT

## 2025-04-21 PROCEDURE — 77073 BONE LENGTH STUDIES: CPT | Mod: 26,,, | Performed by: RADIOLOGY

## 2025-04-21 PROCEDURE — 77073 BONE LENGTH STUDIES: CPT | Mod: TC

## 2025-04-21 PROCEDURE — 99999 PR PBB SHADOW E&M-EST. PATIENT-LVL I: CPT | Mod: PBBFAC,,, | Performed by: FAMILY MEDICINE

## 2025-04-21 PROCEDURE — 99214 OFFICE O/P EST MOD 30 MIN: CPT | Mod: 25,S$GLB,, | Performed by: FAMILY MEDICINE

## 2025-04-21 PROCEDURE — 3051F HG A1C>EQUAL 7.0%<8.0%: CPT | Mod: CPTII,S$GLB,, | Performed by: PHYSICIAN ASSISTANT

## 2025-04-21 PROCEDURE — 3051F HG A1C>EQUAL 7.0%<8.0%: CPT | Mod: CPTII,S$GLB,, | Performed by: FAMILY MEDICINE

## 2025-04-21 PROCEDURE — 1160F RVW MEDS BY RX/DR IN RCRD: CPT | Mod: CPTII,S$GLB,, | Performed by: PHYSICIAN ASSISTANT

## 2025-04-21 PROCEDURE — 20611 DRAIN/INJ JOINT/BURSA W/US: CPT | Mod: RT,S$GLB,, | Performed by: FAMILY MEDICINE

## 2025-04-21 PROCEDURE — 1159F MED LIST DOCD IN RCRD: CPT | Mod: CPTII,S$GLB,, | Performed by: PHYSICIAN ASSISTANT

## 2025-04-21 RX ORDER — ONDANSETRON HYDROCHLORIDE 2 MG/ML
4 INJECTION, SOLUTION INTRAVENOUS EVERY 8 HOURS PRN
OUTPATIENT
Start: 2025-04-21

## 2025-04-21 RX ORDER — SODIUM CHLORIDE 9 MG/ML
INJECTION, SOLUTION INTRAVENOUS
OUTPATIENT
Start: 2025-04-21

## 2025-04-21 RX ORDER — ONDANSETRON 4 MG/1
4 TABLET, ORALLY DISINTEGRATING ORAL EVERY 6 HOURS PRN
Qty: 25 TABLET | Refills: 0 | Status: SHIPPED | OUTPATIENT
Start: 2025-04-21

## 2025-04-21 RX ORDER — SODIUM CHLORIDE 9 MG/ML
INJECTION, SOLUTION INTRAVENOUS CONTINUOUS
OUTPATIENT
Start: 2025-04-21 | End: 2025-04-22

## 2025-04-21 RX ORDER — PREGABALIN 75 MG/1
75 CAPSULE ORAL
OUTPATIENT
Start: 2025-04-21

## 2025-04-21 RX ORDER — PROCHLORPERAZINE EDISYLATE 5 MG/ML
5 INJECTION INTRAMUSCULAR; INTRAVENOUS EVERY 6 HOURS PRN
OUTPATIENT
Start: 2025-04-21

## 2025-04-21 RX ORDER — CELECOXIB 200 MG/1
200 CAPSULE ORAL DAILY
OUTPATIENT
Start: 2025-04-21

## 2025-04-21 RX ORDER — FENTANYL CITRATE 50 UG/ML
25 INJECTION, SOLUTION INTRAMUSCULAR; INTRAVENOUS EVERY 5 MIN PRN
Refills: 0 | OUTPATIENT
Start: 2025-04-21

## 2025-04-21 RX ORDER — OXYCODONE HYDROCHLORIDE 5 MG/1
5 TABLET ORAL EVERY 4 HOURS PRN
Qty: 28 TABLET | Refills: 0 | Status: SHIPPED | OUTPATIENT
Start: 2025-04-21 | End: 2025-04-28

## 2025-04-21 RX ORDER — ACETAMINOPHEN 500 MG
1000 TABLET ORAL
OUTPATIENT
Start: 2025-04-21

## 2025-04-21 RX ORDER — SODIUM CHLORIDE 0.9 % (FLUSH) 0.9 %
10 SYRINGE (ML) INJECTION
OUTPATIENT
Start: 2025-04-21

## 2025-04-21 RX ORDER — PREGABALIN 75 MG/1
75 CAPSULE ORAL NIGHTLY
OUTPATIENT
Start: 2025-04-21

## 2025-04-21 RX ORDER — FAMOTIDINE 20 MG/1
20 TABLET, FILM COATED ORAL 2 TIMES DAILY
OUTPATIENT
Start: 2025-04-21

## 2025-04-21 RX ORDER — TRIAMCINOLONE ACETONIDE 40 MG/ML
20 INJECTION, SUSPENSION INTRA-ARTICULAR; INTRAMUSCULAR
Status: DISCONTINUED | OUTPATIENT
Start: 2025-04-21 | End: 2025-04-21 | Stop reason: HOSPADM

## 2025-04-21 RX ORDER — MUPIROCIN 20 MG/G
1 OINTMENT TOPICAL
OUTPATIENT
Start: 2025-04-21

## 2025-04-21 RX ORDER — FENTANYL CITRATE 50 UG/ML
100 INJECTION, SOLUTION INTRAMUSCULAR; INTRAVENOUS
Refills: 0 | OUTPATIENT
Start: 2025-04-21 | End: 2025-04-22

## 2025-04-21 RX ORDER — AMOXICILLIN 250 MG
1 CAPSULE ORAL 2 TIMES DAILY
OUTPATIENT
Start: 2025-04-21

## 2025-04-21 RX ORDER — ASPIRIN 81 MG/1
81 TABLET ORAL 2 TIMES DAILY
OUTPATIENT
Start: 2025-04-21

## 2025-04-21 RX ORDER — METHOCARBAMOL 500 MG/1
500 TABLET, FILM COATED ORAL 4 TIMES DAILY
Qty: 56 TABLET | Refills: 0 | Status: SHIPPED | OUTPATIENT
Start: 2025-04-21 | End: 2025-05-05

## 2025-04-21 RX ORDER — CELECOXIB 200 MG/1
200 CAPSULE ORAL 2 TIMES DAILY
Qty: 56 CAPSULE | Refills: 0 | Status: SHIPPED | OUTPATIENT
Start: 2025-04-21 | End: 2025-05-19

## 2025-04-21 RX ORDER — CEFADROXIL 500 MG/1
1 CAPSULE ORAL EVERY 12 HOURS
Qty: 28 CAPSULE | Refills: 0 | Status: SHIPPED | OUTPATIENT
Start: 2025-04-21 | End: 2025-04-24 | Stop reason: CLARIF

## 2025-04-21 RX ORDER — PREGABALIN 75 MG/1
75 CAPSULE ORAL 2 TIMES DAILY
Qty: 28 CAPSULE | Refills: 0 | Status: SHIPPED | OUTPATIENT
Start: 2025-04-21 | End: 2025-05-05

## 2025-04-21 RX ORDER — OXYCODONE HYDROCHLORIDE 5 MG/1
5 TABLET ORAL
Refills: 0 | OUTPATIENT
Start: 2025-04-21

## 2025-04-21 RX ORDER — OXYCODONE HYDROCHLORIDE 5 MG/1
10 TABLET ORAL
Refills: 0 | OUTPATIENT
Start: 2025-04-21

## 2025-04-21 RX ORDER — ACETAMINOPHEN 500 MG
1000 TABLET ORAL EVERY 6 HOURS
OUTPATIENT
Start: 2025-04-21

## 2025-04-21 RX ORDER — MIDAZOLAM HYDROCHLORIDE 1 MG/ML
4 INJECTION, SOLUTION INTRAMUSCULAR; INTRAVENOUS
OUTPATIENT
Start: 2025-04-21 | End: 2025-04-22

## 2025-04-21 RX ORDER — NALOXONE HCL 0.4 MG/ML
0.02 VIAL (ML) INJECTION
OUTPATIENT
Start: 2025-04-21

## 2025-04-21 RX ORDER — MUPIROCIN 20 MG/G
1 OINTMENT TOPICAL 2 TIMES DAILY
OUTPATIENT
Start: 2025-04-21 | End: 2025-04-26

## 2025-04-21 RX ORDER — TALC
6 POWDER (GRAM) TOPICAL NIGHTLY PRN
OUTPATIENT
Start: 2025-04-21

## 2025-04-21 RX ORDER — BISACODYL 10 MG/1
10 SUPPOSITORY RECTAL EVERY 12 HOURS PRN
OUTPATIENT
Start: 2025-04-21

## 2025-04-21 RX ORDER — MORPHINE SULFATE 2 MG/ML
2 INJECTION, SOLUTION INTRAMUSCULAR; INTRAVENOUS
Refills: 0 | OUTPATIENT
Start: 2025-04-21

## 2025-04-21 RX ORDER — METHOCARBAMOL 750 MG/1
750 TABLET, FILM COATED ORAL 3 TIMES DAILY
OUTPATIENT
Start: 2025-04-21

## 2025-04-21 RX ORDER — POLYETHYLENE GLYCOL 3350 17 G/17G
17 POWDER, FOR SOLUTION ORAL DAILY
OUTPATIENT
Start: 2025-04-21

## 2025-04-21 RX ORDER — CELECOXIB 200 MG/1
400 CAPSULE ORAL ONCE
OUTPATIENT
Start: 2025-04-21 | End: 2025-04-21

## 2025-04-21 RX ORDER — LIDOCAINE HYDROCHLORIDE 10 MG/ML
1 INJECTION, SOLUTION EPIDURAL; INFILTRATION; INTRACAUDAL; PERINEURAL
OUTPATIENT
Start: 2025-04-21

## 2025-04-21 RX ADMIN — TRIAMCINOLONE ACETONIDE 20 MG: 40 INJECTION, SUSPENSION INTRA-ARTICULAR; INTRAMUSCULAR at 10:04

## 2025-04-21 NOTE — PROCEDURES
"Tendon Origin    Date/Time: 4/21/2025 10:30 AM    Performed by: Kendrick Freire MD  Authorized by: Kendrick Freire MD    Consent Done?:  Yes (Verbal)  Timeout: prior to procedure the correct patient, procedure, and site was verified    Indications:  Pain  Site marked: the procedure site was marked    Timeout: prior to procedure the correct patient, procedure, and site was verified    Location: shoulder.  Prep: patient was prepped and draped in usual sterile fashion    Ultrasonic Guidance for Needle Placement?: Yes    Needle size:  25 G  Approach:  Posterolateral  Medications:  20 mg triamcinolone acetonide 40 mg/mL  Patient tolerance:  Patient tolerated the procedure well with no immediate complications   Supraspinatus tendon and tendon insertion injection RIGHT    Description of ultrasound utilization for needle guidance:   Ultrasound guidance used for needle localization. Images saved and stored for documentation. The supraspinatus muscle, myotendinous junction, and tendon insertion on the greater tuberosity of the humeral head were visualized. Dynamic visualization of the 22g x 1.5" needle was continuous throughout the procedure.    "

## 2025-04-21 NOTE — PROGRESS NOTES
HISTORY OF PRESENT ILLNESS  25.04.21  Last injection therapy worked very well.    Requesting f/u evaluation and re injection, if appropriate. Pt is scheduled for R TKA 4/30/25 and is concerned her sholder pain will limit her ability to use a walker and ambulate post-operatively. Pt was evaluated today by Mady Maxwell PA-C and requested re-evaluation and CSI if appropriate.  No new sx     Sagrario Freire, a 74 y.o. female, presents today for evaluation of her right SHOULDER. Patient is xxx hand dominant.  History of Present Illness (HPI)  #1  Location: lat shoulder, right  Onset duration: insidious  Palliative modifying factors: rel rest, OTC analgesics  Provocative modifying factors: GH ABduction  Prior: none  Progression: plateau discomfort  Quality: sharp pain  Radiation [associated signs and symptoms]: none  Severity: per nursing documentation  Timing: intermittent w/ use  Trauma:   AAFP/FPM: Pocket Guide Documentation Guidelines, (c)2014    (62309=7+, 34153=0+)    Review of systems (ROS):  A 10+ review of systems was performed with pertinent positives and negatives noted above in the history of present illness. Other systems were negative unless otherwise specified.    PHYSICAL EXAMINATION  General:  The patient is alert and oriented x 3. Mood is pleasant. Observation of ears, eyes and nose reveal no gross abnormalities. HEENT: NCAT, sclera anicteric. Lungs: Respirations are equal and unlabored.     SHOULDER EXAMINATION     OBSERVATION:     Swelling  none  Deformity  none   Discoloration  none   Scapular winging none   Scars   none  Atrophy  none    TENDERNESS / CREPITUS (T/C):          T/C      T/C   Clavicle   -/-  SUPRAspinatus    -/-     AC Jt.    -/-  INFRAspinatus  -/-    SC Jt.    -/-  Deltoid    -/-      G. Tuberosity  -/-  LH BICEP groove  -/-   Acromion:  -/-  Midline Neck   -/-     Scapular Spine -/-  Trapezium   -/-   SMA Scapula  -/-  GH jt. line - post  -/-     Scapulothoracic   -/-         ROM:     Right shoulder   Left shoulder        AROM (PROM)   AROM (PROM)   FE    170° (175°)     170° (175°)     ER at 0°    60°  (65°)    60°  (65°)   ER at 90° ABD  90°  (90°)    90°  (90°)   IR at 90°  ABD   NA  (40°)     NA  (40°)      IR (spine level)   T10     T10    STRENGTH: (* = with pain) RIGHT SHOULDER  LEFT SHOULDER   SCAPTION   5/5    5/5    IR    5/5    5/5   ER    5/5    5/5   BICEPS   5/5    5/5   Deltoid    5/5    5/5     SIGNS:  Painful side       NEER   -   SAMREEN        -    DIANA   -   SPEEDS        -   DROP ARM   -   BELLY PRESS       -    X-Body ADD    -   LIFT-OFF        -   HORNBLOWERS      -              STABILITY TESTING   RIGHT SHOULDER  LEFT SHOULDER     Translation     Anterior up face    up face    Posterior up face   up face    Sulcus  < 10mm   < 10 mm     Signs   Apprehension   neg     neg       Relocation   no change    no change      Jerk test  neg    neg    EXTREMITY NEURO-VASCULAR EXAM    Sensation grossly intact to light touch all dermatomal regions.    DTR 2+ Biceps, Triceps, BR and Negative Andriys sign   Grossly intact motor function at Elbow, Wrist and Hand   Distal pulses radial and ulnar 2+, brisk cap refill, symmetric.      NECK:  Painless FROM and spinous processes non-tender. Negative Spurlings sign.       Other Findings:    ASSESSMENT & PLAN  Assessment  #1 Osteoarthritis of glenohumeral joint, right, dominant   W/ supraspinatus tendinosis    No evidence of neurologic pathology  No evidence of vascular pathology    Imaging studies reviewed:   X-ray shoulder, NOT OBTAINED    Plan    We discussed options including    Watchful waiting / relative rest    Physical therapy X   Injection therapy CSI iagh right  CSI supra spin tend right   Consultation    The patient chooses As above   x = prescribed  CSI = corticosteroid injection  VSI = viscosupplement injection  PRPI = platelet rich plasma injection  ia = intra articular  R = right  L = left  B =  bilateral   nfSx = surgical consultation was recommended, but patient is not interested in consultation at this time    Physical Therapy        Formal (fPT), @ [facility] facility p   Formal (fPT), @ [facility] facility        Homegoing (hgPT), per concurrent fPT recommendations    Homegoing (hgPT), per prior fPT recommendations t   Homegoing (hgPT), handout provided        w/  (atPT)    [blank] = not prescribed  x = prescribed  b = prescribed, and begin as indicated  t = continue as indicated  r = prescribed, and restart as indicated  p = completed prior as indicated  hs = prescribed, and with high school   col = prescribed, and with college or university   nfPT = physical therapy was recommended, but patient is not interested in PT at this time    Activity (e.g. sports, work) restrictions    [blank] = as tolerated  pt = per physical therapist  at = per   NWB = non weight bearing on affected lower extremity, with crutches assistance for ambulation    Bracing    [blank] = not prescribed  r = recommended, but not fit with at todays visit  f = prescribed and fit with at todays visit  t = continue as indicated  d = d/c  p = as needed  rare = use on rare, as-needed basis; advised against chronic use    Pain management    [blank] = No prescription necessary. A handout detailing dosing of appropriate   over-the-counter musculoskeletal analgesics was made available to the patient.   m = meloxicam x 14 days  mp = 14 day course of meloxicam prescribed prior    Follow up Per pt  Obtain x-ray shoulder right   [blank] = as needed  [number] = in [number] weeks  CSI = for corticosteroid injection  VSI = for viscosupplement injection or injection series  PRP = for platelet rich plasma injection or injection series  MRI = after MRI imaging  ns = should surgical options be deferred (no surgery)  o = appointment offered, deferred by patient    Should symptoms worsen or fail to  resolve, consider    Revisiting the above options and / or MRI     Vocation:

## 2025-04-21 NOTE — H&P (VIEW-ONLY)
Sagrario Freire  is here for a completion of her perioperative paperwork. she  Is scheduled to undergo  RIGHT total knee arthroplasty with [employer] system.  Universal base plate tibia.  Antibiotic cement.  Non X 3 components on 4/30/2025.  She is a healthy individual and does need clearance for this procedure.     Has appointment with Dr. Escoto for clearance on 4/24/2025.     Full-length standing x-rays at orthopedic preop appointment.     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  Off anticoagulation now.  The plan post right total knee replacement would be Eliquis 2.5 mg twice a day for 12 weeks.     Risks, indications and benefits of the surgical procedure were discussed with the patient. All questions with regard to surgery, rehab, expected return to functional activities, activities of daily living and recreational endeavors were answered to her satisfaction.    Discussed COVID-19 with the patient, they are aware of our current policies and procedures, were given the option of delaying surgery, and they elect to proceed.    Patient was informed and understands the risks of surgery are greater for patients with a current condition or hx of heart disease, obesity, clotting disorders, recurrent infections, steroid use, current or past smoking, and factors such as sedentary lifestyle and noncompliance with medications, therapy or f/u. The degree of the increased risk is hard to estimate w/ any degree of precision.    Once no other questions were asked, a brief history and physical exam was then performed.    PAST MEDICAL HISTORY:   Past Medical History:   Diagnosis Date    Abnormal ECG     Frequently monitored by primary per pt    Diabetes mellitus, type 2     GERD (gastroesophageal reflux disease)     Hypothyroidism      PAST SURGICAL HISTORY:   Past Surgical History:   Procedure Laterality Date    ABDOMINAL HERNIA REPAIR      BELT ABDOMINOPLASTY       "COLECTOMY      partial     hand operation Right     "scraped the tendon"    TOTAL KNEE ARTHROPLASTY Left 4/14/2021    Procedure: ARTHROPLASTY, KNEE, TOTAL;  Surgeon: SANTO Hector MD;  Location: [facility];  Service: Orthopedics;  Laterality: Left;  outpatient with 23hr observation  Regional with catheter- spinal & adductor  pericapsular injection: 30cc VITO injection     FAMILY HISTORY:   Family History   Problem Relation Name Age of Onset    Heart disease Mother      COPD Mother      Diabetes Mother      Heart disease Father      Lymphoma Father       SOCIAL HISTORY: Social History[1]    MEDICATIONS: Current Medications[2]  ALLERGIES:   Review of patient's allergies indicates:   Allergen Reactions    Iodinated contrast media Other (See Comments)     Hypotension, hypoxia       Review of Systems   Constitution: Negative. Negative for chills, fever and night sweats.   HENT: Negative for congestion and headaches.    Eyes: Negative for blurred vision, left vision loss and right vision loss.   Cardiovascular: Negative for chest pain and syncope.   Respiratory: Negative for cough and shortness of breath.    Endocrine: Negative for polydipsia, polyphagia and polyuria.   Hematologic/Lymphatic: Negative for bleeding problem. Does not bruise/bleed easily.   Skin: Negative for dry skin, itching and rash.   Musculoskeletal: Negative for falls and muscle weakness.   Gastrointestinal: Negative for abdominal pain and bowel incontinence.   Genitourinary: Negative for bladder incontinence and nocturia.   Neurological: Negative for disturbances in coordination, loss of balance and seizures.   Psychiatric/Behavioral: Negative for depression. The patient does not have insomnia.    Allergic/Immunologic: Negative for hives and persistent infections.     PHYSICAL EXAM:  GEN: A&Ox3, WD WN NAD  HEENT: WNL  CHEST: CTAB, no W/R/R  HEART: RRR, no M/R/G   ABD: Soft, NT ND, BS x4 QUADS  MS: Refer to previous note for detailed MS exam  NEURO: " CN II-XII intact       The surgical consent was then reviewed with the patient, who agreed with all the contents of the consent form and it was signed.     PHYSICAL THERAPY:  She was also instructed regarding physical therapy and will begin on POD#1-3. She is doing physical therapy at [facility].    POST OP CARE: Instructions were reviewed including care of the wound and dressing after surgery and when she can shower.     PAIN MANAGEMENT: Sagrario Castañedalk was instructed regarding the Polar ice unit that will be in place after surgery and her postoperative pain medications.     MEDICATION:  Roxicodone 5 mg 1-2 q 4 hours PRN for pain  Zofran 4 mg q 8 hours PRN for nausea and vomiting.  Eliquis 2.5mg BID x 12 weeks for DVT prophylaxis starting on the evening after surgery.  Celebrex 200mg BID x 4 weeks post op  Cefadroxil 500mg q12h x 7 days post op for infection prophylaxis  Lyrica 75mg BID x 2 weeks  Robaxin 500mg q8hr x 2 weeks as needed for muscle spasms     Post op meds to be delivered bedside prior to discharge. Deliver to family if patient is in surgery at 5pm.     Patient was instructed to purchase and take Colace to counter possible GI side effects of taking opiates.     DVT prophylaxis was discussed with the patient today including risk factors for developing DVTs and history of DVTs. The patient was asked if any specific recommendations were given from the doctor/s that did pre-operative surgical clearance.      If the patient was previously taking 81mg baby aspirin, they were told to not take additional baby aspirin, using the above stated aspirin and to restart the 81mg aspirin daily after completion of the aspirin dose.      Patient was also told to buy over the counter Prilosec medication and take it once daily for GI protection as long as they are taking NSAIDs or Aspirin.     The patient was told that narcotic pain medications may make them drowsy and instructions  were given to not sign legal documents, drive or operate heavy machinery, cars, or equipment while under the influence of narcotic medications.     As there were no other questions to be asked, she was given my business card along with Dr. Hector's business card if she has any questions or concerns prior to surgery or in the postop period.          [1]   Social History  Socioeconomic History    Marital status:      Spouse name: Armin    Number of children: 3   Occupational History     Comment:    Tobacco Use    Smoking status: Never   Substance and Sexual Activity    Alcohol use: Not Currently    Drug use: Never    Sexual activity: Yes   Social History Narrative    Lives in house with no steps     Social Drivers of Health     Financial Resource Strain: Low Risk  (4/11/2025)    Received from [facility]    Overall Financial Resource Strain (CARDIA)     Difficulty of Paying Living Expenses: Not very hard   Food Insecurity: No Food Insecurity (4/11/2025)    Received from [facility]    Hunger Vital Sign     Worried About Running Out of Food in the Last Year: Never true     Ran Out of Food in the Last Year: Never true   Transportation Needs: No Transportation Needs (4/11/2025)    Received from [facility]    PRAPARE - Transportation     Lack of Transportation (Medical): No     Lack of Transportation (Non-Medical): No   Physical Activity: Insufficiently Active (4/11/2025)    Received from [facility]    Exercise Vital Sign     Days of Exercise per Week: 1 day     Minutes of Exercise per Session: 10 min   Stress: Stress Concern Present (4/11/2025)    Received from [facility]    [employer] [place] [employer] - Occupational Stress Questionnaire     Feeling of Stress : To some extent   Housing Stability: Unknown (4/11/2025)    Received from [facility]    Housing Stability Vital Sign     Unable to Pay for Housing in the Last Year: No   [2]   Current Outpatient Medications:     acetaminophen  (TYLENOL) 500 MG tablet, Take 500 mg by mouth as needed for Pain., Disp: , Rfl:     ALPRAZolam (XANAX) 0.5 MG tablet, Take 0.5 mg by mouth every 12 (twelve) hours. Take as scheduled, Disp: , Rfl: 0    atorvastatin (LIPITOR) 10 MG tablet, Take 10 mg by mouth once daily. Take as scheduled, Disp: , Rfl:     celecoxib (CELEBREX) 200 MG capsule, Take 1 capsule (200 mg total) by mouth once daily., Disp: 30 capsule, Rfl: 1    cephALEXin (KEFLEX) 500 MG capsule, Take 1 capsule (500 mg total) by mouth 4 (four) times daily. Take 4 capsules prior to dental procedure., Disp: 4 capsule, Rfl: 2    dulaglutide (TRULICITY) 1.5 mg/0.5 mL PnIj, Inject 3 mg into the skin. Take as scheduled, Disp: , Rfl:     famotidine (PEPCID) 20 MG tablet, Take 20 mg by mouth once. Take as scheduled, Disp: , Rfl:     gabapentin (NEURONTIN) 100 MG capsule, Take 100 mg by mouth 3 (three) times daily., Disp: , Rfl:     glimepiride (AMARYL) 2 MG tablet, Take 2 mg by mouth every 12 (twelve) hours. Hold morning of surgery, Disp: , Rfl:     ibuprofen 200 mg Cap, Take by mouth daily as needed., Disp: , Rfl:     insulin glargine U-100, Lantus, 100 unit/mL (3 mL) SubQ InPn pen, Inject 15 Units into the skin., Disp: , Rfl:     insulin lispro 100 unit/mL pen, SMARTSIG:15 Unit(s) SUB-Q 3 Times Daily, Disp: , Rfl:     insulin NPH-insulin regular, 70/30, (NOVOLIN 70/30) 100 unit/mL (70-30) injection, Inject 10 Units into the skin 2 (two) times daily. Take 100% pm before surgery=10 units Take 30% am of surgery=3 units, Disp: , Rfl:     levothyroxine (SYNTHROID) 50 MCG tablet, Take 50 mcg by mouth once daily. Take as scheduled, Disp: , Rfl:     metFORMIN (GLUCOPHAGE) 1000 MG tablet, Take 1,000 mg by mouth 2 (two) times daily with meals. Hold night before surgery and morning of surgery, Disp: , Rfl:     methylPREDNISolone (MEDROL DOSEPACK) 4 mg tablet, use as directed, Disp: 1 each, Rfl: 0    pantoprazole (PROTONIX) 40 MG tablet, Take 1 tablet by mouth every morning.,  Disp: , Rfl:     tirzepatide (MOUNJARO) 5 mg/0.5 mL PnIj, Inject into the skin every 7 days., Disp: , Rfl:     zolpidem (AMBIEN) 10 mg Tab, Take 10 mg by mouth once daily. Take as scheduled, Disp: , Rfl: 0    apixaban (ELIQUIS) 2.5 mg Tab, Take 1 tablet (2.5 mg total) by mouth 2 (two) times daily., Disp: 168 tablet, Rfl: 0    aspirin (ECOTRIN) 81 MG EC tablet, Take 1 tablet (81 mg total) by mouth 2 (two) times daily. (Patient taking differently: Take 81 mg by mouth once. Ok to take as scheduled), Disp: 56 tablet, Rfl: 0    cefadroxil (DURICEF) 500 MG Cap, Take 2 capsules (1 g total) by mouth every 12 (twelve) hours. for 7 days, Disp: 28 capsule, Rfl: 0    celecoxib (CELEBREX) 200 MG capsule, Take 1 capsule (200 mg total) by mouth 2 (two) times daily., Disp: 56 capsule, Rfl: 0    methocarbamoL (ROBAXIN) 500 MG Tab, Take 1 tablet (500 mg total) by mouth 4 (four) times daily. for 14 days, Disp: 56 tablet, Rfl: 0    ondansetron (ZOFRAN-ODT) 4 MG TbDL, Take 1 tablet (4 mg total) by mouth every 6 (six) hours as needed., Disp: 25 tablet, Rfl: 0    oxyCODONE (ROXICODONE) 5 MG immediate release tablet, Take 1 tablet (5 mg total) by mouth every 4 (four) hours as needed for Pain., Disp: 28 tablet, Rfl: 0    pregabalin (LYRICA) 75 MG capsule, Take 1 capsule (75 mg total) by mouth 2 (two) times daily. for 14 days, Disp: 28 capsule, Rfl: 0  No current facility-administered medications for this visit.    Facility-Administered Medications Ordered in Other Visits:     fentaNYL 50 mcg/mL injection 25 mcg, 25 mcg, Intravenous, Q5 Min PRN, Chema Burt MD, 50 mcg at 04/14/21 1305    midazolam (VERSED) 1 mg/mL injection 0.5 mg, 0.5 mg, Intravenous, PRN, Chema Burt MD, 2 mg at 04/14/21 1300    [facility]% [facility] infusion 500 ML, , Perineural, Continuous, Chema Burt MD, New Bag at 04/14/21 1810

## 2025-04-21 NOTE — H&P
Sagrario Freire  is here for a completion of her perioperative paperwork. she  Is scheduled to undergo  RIGHT total knee arthroplasty with Friend[employer] system.  Universal base plate tibia.  Antibiotic cement.  Non X 3 components on 4/30/2025.  She is a healthy individual and does need clearance for this procedure.     Has appointment with Dr. Escoto for clearance on 4/24/2025.     Full-length standing x-rays at orthopedic preop appointment.     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  Off anticoagulation now.  The plan post right total knee replacement would be Eliquis 2.5 mg twice a day for 12 weeks.     Risks, indications and benefits of the surgical procedure were discussed with the patient. All questions with regard to surgery, rehab, expected return to functional activities, activities of daily living and recreational endeavors were answered to her satisfaction.    Discussed COVID-19 with the patient, they are aware of our current policies and procedures, were given the option of delaying surgery, and they elect to proceed.    Patient was informed and understands the risks of surgery are greater for patients with a current condition or hx of heart disease, obesity, clotting disorders, recurrent infections, steroid use, current or past smoking, and factors such as sedentary lifestyle and noncompliance with medications, therapy or f/u. The degree of the increased risk is hard to estimate w/ any degree of precision.    Once no other questions were asked, a brief history and physical exam was then performed.    PAST MEDICAL HISTORY:   Past Medical History:   Diagnosis Date    Abnormal ECG     Frequently monitored by primary per pt    Diabetes mellitus, type 2     GERD (gastroesophageal reflux disease)     Hypothyroidism      PAST SURGICAL HISTORY:   Past Surgical History:   Procedure Laterality Date    ABDOMINAL HERNIA REPAIR      BELT ABDOMINOPLASTY       "COLECTOMY      partial     hand operation Right     "scraped the tendon"    TOTAL KNEE ARTHROPLASTY Left 4/14/2021    Procedure: ARTHROPLASTY, KNEE, TOTAL;  Surgeon: SANTO Hector MD;  Location: [facility];  Service: Orthopedics;  Laterality: Left;  outpatient with 23hr observation  Regional with catheter- spinal & adductor  pericapsular injection: 30cc VITO injection     FAMILY HISTORY:   Family History   Problem Relation Name Age of Onset    Heart disease Mother      COPD Mother      Diabetes Mother      Heart disease Father      Lymphoma Father       SOCIAL HISTORY: Social History[1]    MEDICATIONS: Current Medications[2]  ALLERGIES:   Review of patient's allergies indicates:   Allergen Reactions    Iodinated contrast media Other (See Comments)     Hypotension, hypoxia       Review of Systems   Constitution: Negative. Negative for chills, fever and night sweats.   HENT: Negative for congestion and headaches.    Eyes: Negative for blurred vision, left vision loss and right vision loss.   Cardiovascular: Negative for chest pain and syncope.   Respiratory: Negative for cough and shortness of breath.    Endocrine: Negative for polydipsia, polyphagia and polyuria.   Hematologic/Lymphatic: Negative for bleeding problem. Does not bruise/bleed easily.   Skin: Negative for dry skin, itching and rash.   Musculoskeletal: Negative for falls and muscle weakness.   Gastrointestinal: Negative for abdominal pain and bowel incontinence.   Genitourinary: Negative for bladder incontinence and nocturia.   Neurological: Negative for disturbances in coordination, loss of balance and seizures.   Psychiatric/Behavioral: Negative for depression. The patient does not have insomnia.    Allergic/Immunologic: Negative for hives and persistent infections.     PHYSICAL EXAM:  GEN: A&Ox3, WD WN NAD  HEENT: WNL  CHEST: CTAB, no W/R/R  HEART: RRR, no M/R/G   ABD: Soft, NT ND, BS x4 QUADS  MS: Refer to previous note for detailed MS exam  NEURO: " CN II-XII intact       The surgical consent was then reviewed with the patient, who agreed with all the contents of the consent form and it was signed.     PHYSICAL THERAPY:  She was also instructed regarding physical therapy and will begin on POD#1-3. She is doing physical therapy at [facility].    POST OP CARE: Instructions were reviewed including care of the wound and dressing after surgery and when she can shower.     PAIN MANAGEMENT: aSgrario Castañedalk was instructed regarding the Polar ice unit that will be in place after surgery and her postoperative pain medications.     MEDICATION:  Roxicodone 5 mg 1-2 q 4 hours PRN for pain  Zofran 4 mg q 8 hours PRN for nausea and vomiting.  Eliquis 2.5mg BID x 12 weeks for DVT prophylaxis starting on the evening after surgery.  Celebrex 200mg BID x 4 weeks post op  Cefadroxil 500mg q12h x 7 days post op for infection prophylaxis  Lyrica 75mg BID x 2 weeks  Robaxin 500mg q8hr x 2 weeks as needed for muscle spasms     Post op meds to be delivered bedside prior to discharge. Deliver to family if patient is in surgery at 5pm.     Patient was instructed to purchase and take Colace to counter possible GI side effects of taking opiates.     DVT prophylaxis was discussed with the patient today including risk factors for developing DVTs and history of DVTs. The patient was asked if any specific recommendations were given from the doctor/s that did pre-operative surgical clearance.      If the patient was previously taking 81mg baby aspirin, they were told to not take additional baby aspirin, using the above stated aspirin and to restart the 81mg aspirin daily after completion of the aspirin dose.      Patient was also told to buy over the counter Prilosec medication and take it once daily for GI protection as long as they are taking NSAIDs or Aspirin.     The patient was told that narcotic pain medications may make them drowsy and instructions  were given to not sign legal documents, drive or operate heavy machinery, cars, or equipment while under the influence of narcotic medications.     As there were no other questions to be asked, she was given my business card along with Dr. Hector's business card if she has any questions or concerns prior to surgery or in the postop period.          [1]   Social History  Socioeconomic History    Marital status:      Spouse name: Armin    Number of children: 3   Occupational History     Comment:    Tobacco Use    Smoking status: Never   Substance and Sexual Activity    Alcohol use: Not Currently    Drug use: Never    Sexual activity: Yes   Social History Narrative    Lives in house with no steps     Social Drivers of Health     Financial Resource Strain: Low Risk  (4/11/2025)    Received from [facility]    Overall Financial Resource Strain (CARDIA)     Difficulty of Paying Living Expenses: Not very hard   Food Insecurity: No Food Insecurity (4/11/2025)    Received from [facility]    Hunger Vital Sign     Worried About Running Out of Food in the Last Year: Never true     Ran Out of Food in the Last Year: Never true   Transportation Needs: No Transportation Needs (4/11/2025)    Received from [facility]    PRAPARE - Transportation     Lack of Transportation (Medical): No     Lack of Transportation (Non-Medical): No   Physical Activity: Insufficiently Active (4/11/2025)    Received from [facility]    Exercise Vital Sign     Days of Exercise per Week: 1 day     Minutes of Exercise per Session: 10 min   Stress: Stress Concern Present (4/11/2025)    Received from [facility]    [place] [employer] [place] [employer] - Occupational Stress Questionnaire     Feeling of Stress : To some extent   Housing Stability: Unknown (4/11/2025)    Received from [facility]    Housing Stability Vital Sign     Unable to Pay for Housing in the Last Year: No   [2]   Current Outpatient Medications:     acetaminophen  (TYLENOL) 500 MG tablet, Take 500 mg by mouth as needed for Pain., Disp: , Rfl:     ALPRAZolam (XANAX) 0.5 MG tablet, Take 0.5 mg by mouth every 12 (twelve) hours. Take as scheduled, Disp: , Rfl: 0    atorvastatin (LIPITOR) 10 MG tablet, Take 10 mg by mouth once daily. Take as scheduled, Disp: , Rfl:     celecoxib (CELEBREX) 200 MG capsule, Take 1 capsule (200 mg total) by mouth once daily., Disp: 30 capsule, Rfl: 1    cephALEXin (KEFLEX) 500 MG capsule, Take 1 capsule (500 mg total) by mouth 4 (four) times daily. Take 4 capsules prior to dental procedure., Disp: 4 capsule, Rfl: 2    dulaglutide (TRULICITY) 1.5 mg/0.5 mL PnIj, Inject 3 mg into the skin. Take as scheduled, Disp: , Rfl:     famotidine (PEPCID) 20 MG tablet, Take 20 mg by mouth once. Take as scheduled, Disp: , Rfl:     gabapentin (NEURONTIN) 100 MG capsule, Take 100 mg by mouth 3 (three) times daily., Disp: , Rfl:     glimepiride (AMARYL) 2 MG tablet, Take 2 mg by mouth every 12 (twelve) hours. Hold morning of surgery, Disp: , Rfl:     ibuprofen 200 mg Cap, Take by mouth daily as needed., Disp: , Rfl:     insulin glargine U-100, Lantus, 100 unit/mL (3 mL) SubQ InPn pen, Inject 15 Units into the skin., Disp: , Rfl:     insulin lispro 100 unit/mL pen, SMARTSIG:15 Unit(s) SUB-Q 3 Times Daily, Disp: , Rfl:     insulin NPH-insulin regular, 70/30, (NOVOLIN 70/30) 100 unit/mL (70-30) injection, Inject 10 Units into the skin 2 (two) times daily. Take 100% pm before surgery=10 units Take 30% am of surgery=3 units, Disp: , Rfl:     levothyroxine (SYNTHROID) 50 MCG tablet, Take 50 mcg by mouth once daily. Take as scheduled, Disp: , Rfl:     metFORMIN (GLUCOPHAGE) 1000 MG tablet, Take 1,000 mg by mouth 2 (two) times daily with meals. Hold night before surgery and morning of surgery, Disp: , Rfl:     methylPREDNISolone (MEDROL DOSEPACK) 4 mg tablet, use as directed, Disp: 1 each, Rfl: 0    pantoprazole (PROTONIX) 40 MG tablet, Take 1 tablet by mouth every morning.,  Disp: , Rfl:     tirzepatide (MOUNJARO) 5 mg/0.5 mL PnIj, Inject into the skin every 7 days., Disp: , Rfl:     zolpidem (AMBIEN) 10 mg Tab, Take 10 mg by mouth once daily. Take as scheduled, Disp: , Rfl: 0    apixaban (ELIQUIS) 2.5 mg Tab, Take 1 tablet (2.5 mg total) by mouth 2 (two) times daily., Disp: 168 tablet, Rfl: 0    aspirin (ECOTRIN) 81 MG EC tablet, Take 1 tablet (81 mg total) by mouth 2 (two) times daily. (Patient taking differently: Take 81 mg by mouth once. Ok to take as scheduled), Disp: 56 tablet, Rfl: 0    cefadroxil (DURICEF) 500 MG Cap, Take 2 capsules (1 g total) by mouth every 12 (twelve) hours. for 7 days, Disp: 28 capsule, Rfl: 0    celecoxib (CELEBREX) 200 MG capsule, Take 1 capsule (200 mg total) by mouth 2 (two) times daily., Disp: 56 capsule, Rfl: 0    methocarbamoL (ROBAXIN) 500 MG Tab, Take 1 tablet (500 mg total) by mouth 4 (four) times daily. for 14 days, Disp: 56 tablet, Rfl: 0    ondansetron (ZOFRAN-ODT) 4 MG TbDL, Take 1 tablet (4 mg total) by mouth every 6 (six) hours as needed., Disp: 25 tablet, Rfl: 0    oxyCODONE (ROXICODONE) 5 MG immediate release tablet, Take 1 tablet (5 mg total) by mouth every 4 (four) hours as needed for Pain., Disp: 28 tablet, Rfl: 0    pregabalin (LYRICA) 75 MG capsule, Take 1 capsule (75 mg total) by mouth 2 (two) times daily. for 14 days, Disp: 28 capsule, Rfl: 0  No current facility-administered medications for this visit.    Facility-Administered Medications Ordered in Other Visits:     fentaNYL 50 mcg/mL injection 25 mcg, 25 mcg, Intravenous, Q5 Min PRN, Chema Burt MD, 50 mcg at 04/14/21 1305    midazolam (VERSED) 1 mg/mL injection 0.5 mg, 0.5 mg, Intravenous, PRN, Chema Burt MD, 2 mg at 04/14/21 1300    ropivacaine 0.2% [facility] Pain[facility] Pump infusion 500 ML, , Perineural, Continuous, Chema Burt MD, New Bag at 04/14/21 1810

## 2025-04-21 NOTE — PROCEDURES
"Large Joint Aspiration/Injection: R glenohumeral    Date/Time: 4/21/2025 10:30 AM    Performed by: Kendrick Freire MD  Authorized by: Kendrick Freire MD    Consent Done?:  Yes (Verbal)  Indications:  Pain  Site marked: the procedure site was marked    Timeout: prior to procedure the correct patient, procedure, and site was verified    Prep: patient was prepped and draped in usual sterile fashion      Details:  Needle Size:  22 G  Ultrasonic Guidance for needle placement?: Yes    Images are saved and documented.  Approach:  Posterior  Location:  Shoulder  Site:  R glenohumeral  Medications:  20 mg triamcinolone acetonide 40 mg/mL  Patient tolerance:  Patient tolerated the procedure well with no immediate complications     Description of ultrasound utilization for needle guidance:   Ultrasound guidance used for needle localization. Images saved and stored for documentation. The glenohumeral joint was visualized. Dynamic visualization of the 22g x 3.5" needle was continuous throughout the procedure.     "

## 2025-04-21 NOTE — PROGRESS NOTES
Sagrario Freire is a 74 y.o. year old here today for pre surgery optimization visit  in preparation for a Right total knee arthroplasty to be performed by Dr. Hector on 4/30/2025.  she was last seen and treated in the clinic on 4/8/2025. she will be medically optimized by the pre op center. There has been no significant change in medical status since last visit. No fever, chills, malaise, or unexplained weight change.      Allergies, Medications, past medical and surgical history reviewed.    Focused examination performed.    Patient declined to see surgeon today. All questions answered. Patient encouraged to call with questions. Contact information given.     Pre, edgardo, and post operative procedures and expectations discussed. Goals of successful surgery reviewed and include manageable pain levels, surgical site free of infection, medication management, and ambulation with PT/OT assistance. Healthy weight management discussed with patient and caregiver who were receptive to eduction of healthy diet and activity. No other necessary lifestyle changes identified. Educated patient about signs and symptoms of infection, medication management, anticoagulation therapy, risk of tobacco and alcohol use, and self-care to promote healing. Surgical guide given for future reference. Hibiclens given to patient with instructions. All questions were answered.     Sagrario Freire verbalized an understanding to the education and goals. Patient has displayed readiness to engage in care and is ready to proceed with surgery.  Patient reports her  is able and ready to provide assistance at home after discharge.    Surgical and blood consents signed.    Sagrario Castañedalk will contact us if there are any questions, concerns, or changes in medical status prior to surgery.       Joint class: completed    Patient has discussed discharge planning with surgeon. Patient will be discharged to home following surgery.   patient will  be scheduled with [facility].     50 minutes of time was spent on patient education, review of records, templating, H&P, , appointment scheduling and optimizing patient for surgery.

## 2025-04-23 ENCOUNTER — HOSPITAL ENCOUNTER (OUTPATIENT)
Dept: CARDIOLOGY | Facility: CLINIC | Age: 75
Discharge: HOME | End: 2025-04-23
Payer: MEDICARE

## 2025-04-23 DIAGNOSIS — Z01.818 PRE-OP TESTING: ICD-10-CM

## 2025-04-23 LAB
OHS QRS DURATION: 112 MS
OHS QTC CALCULATION: 467 MS

## 2025-04-23 PROCEDURE — 93005 ELECTROCARDIOGRAM TRACING: CPT | Mod: S$GLB,,, | Performed by: ANESTHESIOLOGY

## 2025-04-23 PROCEDURE — 93010 ELECTROCARDIOGRAM REPORT: CPT | Mod: S$GLB,,, | Performed by: INTERNAL MEDICINE

## 2025-04-23 NOTE — DISCHARGE INSTRUCTIONS
Your surgery has been scheduled for:__4/30/2025__    You should report to:[facility]: [address] [address]     Please Note   Tell your doctor if you take Aspirin, products containing Aspirin, herbal medications  or blood thinners, such as Coumadin, Ticlid, or Plavix.  (Consult your provider regarding holding or stopping before surgery).  Arrange for someone to drive you home following surgery.  You will not be allowed to leave the surgical facility alone or drive yourself home following sedation and anesthesia.    Before Surgery  Stop taking all herbal medications, vitamins, and supplements 7 days prior to surgery  No Motrin/Advil (Ibuprofen) 7 days before surgery  No Aleve (Naproxen) 7 days before surgery  Stop Taking Asprin, products containing Asprin __7___days before surgery  Stop taking blood thinners_not taking any anticoagulant prior to surgery.  No Goody's/BC  Powder 7 days before surgery  Refrain from drinking alcoholic beverages for 24hours before and after surgery  Stop or limit smoking ____7_____days before surgery  You may take Tylenol for pain    Night before Surgery  Do not eat or drink after midnight  Take a shower or bath (shower is recommended).  Bathe with Hibiclens soap or an antibacterial soap from the neck down.  If not supplied by your surgeon, hibiclens soap will need to be purchased over the counter in pharmacy.  Rinse soap off thoroughly.  Shampoo your hair with your regular shampoo    The Day of Surgery  Take another bath or shower with hibiclens or any antibacterial soap, to reduce the chance of infection.  Take heart and blood pressure medications with a small sip of water, as advised by the perioperative team.  Do not take fluid pills  You may brush your teeth and rinse your mouth, but do not swall any additional water.   Do not apply perfumes, powder, body lotions or deodorant on the day of surgery.  Nail polish  should be removed.  Do not wear makeup or moisturizer  Wear comfortable clothes, such as a button front shirt and loose fitting pants.  Leave all jewelry, including body piercings, and valuables at home.    Bring any devices you will neeed after surgery such as crutches or canes.  If you have sleep apnea, please bring your CPAP machine  In the event that your physical condition changes including the onset of a cold or respiratory illness, or if you have to delay or cancel your surgery, please notify your surgeon.     Anesthesia: Regional Anesthesia    Youre scheduled for surgery. During surgery, youll receive medicine called anesthesia to keep you comfortable and pain-free. Your surgeon has decided that youll receive regional anesthesia. This sheet tells you what to expect with this type of anesthesia.  What is regional anesthesia?  Regional anesthesia numbs one region of your body. The anesthesia may be given around nerves or into veins in your arms, neck, or legs (nerve block or Marcus block). Or it may be sent into the spinal fluid (spinal anesthesia) or into the space just outside the spinal fluid (epidural anesthesia). You may also be given sedatives to help you relax.  Nerve block or [place] block  A small area of the body, such as an arm or leg, can be numbed using a nerve block or Marcus block.  Nerve block. During a nerve block, your skin is numbed. A needle is then inserted near nerves that serve the area to be numbed. Anesthetic is sent through the needle.  IV regional or [place] block. For this type of block, an IV line is put into a vein. The blood flow to the area to be numbed is blocked for a short time. Anesthetic is sent through the IV.  Spinal anesthesia  Spinal anesthesia numbs your body from about the waist down.  Anesthetic is injected into the spinal fluid. This is a substance that surrounds the spinal cord in your spinal column. The anesthetic blocks pain traveling from the body to the brain.  To  receive the anesthetic, your skin is numbed at the injection site on your back.  A needle is then inserted into the spinal space. Anesthetic is sent into the spinal fluid through the needle.  Epidural anesthesia  Epidural anesthesia is most commonly used during childbirth and may also be used after surgical procedures of the chest, belly, and legs.  Anesthetic is injected into the epidural space. This is just outside the dural sac which contains the spinal fluid.  To receive the anesthetic, your skin is numbed at the injection site on your back.  A needle is then inserted into the epidural space. Anesthetic is sent into the epidural space through the needle.  A small flexible catheter may be attached to the needle and left in place. This allows for continuous injections or infusions of anesthetic.  Anesthesia tools and medicines that might be near you during your procedure  Local anesthetic. This medicine is given through a needle numbs one region of your body.  Electrocardiography leads (electrodes). These are used to record your heart rate and rhythm.  Blood pressure cuff. A cuff is placed on your arm to keep track of your blood pressure.  Pulse oximeter. This small clip is placed on the end of the finger. It measures your blood oxygen level.  Sedatives. These medicines may be given through an IV. They help to relax you and keep you comfortable. You may stay awake or sleep lightly.  Oxygen. You may be given oxygen through a facemask.  Risks and possible complications  Regional anesthesia carries some risks. These include:  Nausea and vomiting  Headache  Backache  Decreased blood pressure  Allergic reaction to the anesthetic  Ongoing numbness (rare)  Irregular heartbeat (rare)  Cardiac arrest (rare)   Date Last Reviewed: 12/1/2016 © 2000-2017 [employer]. [address] [place] [address], [place] [address]. All rights reserved. This information is not intended as a substitute for professional medical care.  Always follow your healthcare professional's instructions.

## 2025-04-24 ENCOUNTER — HOSPITAL ENCOUNTER (OUTPATIENT)
Dept: PREADMISSION TESTING | Facility: HOSPITAL | Age: 75
Discharge: HOME | End: 2025-04-24
Attending: ORTHOPAEDIC SURGERY
Payer: MEDICARE

## 2025-04-28 ENCOUNTER — TELEPHONE (OUTPATIENT)
Dept: SPORTS MEDICINE | Facility: CLINIC | Age: 75
End: 2025-04-28
Payer: MEDICARE

## 2025-04-28 NOTE — TELEPHONE ENCOUNTER
----- Message from Rohini sent at 4/28/2025 11:17 AM CDT -----  Pt Requesting Call BackElidia called: Radha called for pt:Best call back #:  [phone] notes: pt said she needs to speak to Miesha Espinoza regarding her pre op paperwork; says she wants to make sure everything is in order

## 2025-04-28 NOTE — TELEPHONE ENCOUNTER
[place] [facility], [phone]. Requested confirmation of pt's surgical clearance. Pt was seen 04/21/25 & office note does not specify. Rep stated message would be sent to Maria C.

## 2025-04-28 NOTE — TELEPHONE ENCOUNTER
Spoke c pt. She states that her PCP stated that clearance form was signed & faxed. Advised that we don't have have anything that requires signature, pt stated this was from Dr. Escoto's office. She stated Dr. Escoto's office cancelled her pre-op appt as she was seen recently by PCP. Advised that I would call PCP again for clarification. Provided pt c our fax.     ==  Spoke c [facility] - Dr. Singh office, [phone]. Again requested pt's clearance for surgery be faxed to our office at [phone].

## 2025-04-29 ENCOUNTER — ANESTHESIA EVENT (OUTPATIENT)
Dept: SURGERY | Facility: HOSPITAL | Age: 75
End: 2025-04-29
Payer: MEDICARE

## 2025-04-29 ENCOUNTER — TELEPHONE (OUTPATIENT)
Dept: SPORTS MEDICINE | Facility: CLINIC | Age: 75
End: 2025-04-29
Payer: MEDICARE

## 2025-04-29 NOTE — TELEPHONE ENCOUNTER
Attempted to contact pt & pt's . Left voicemails. Called with NEW arrival time of 0800 due to change in order. Asked pt to return call to clinic at [phone] or respond to [employer] message ASAP.       
no

## 2025-04-29 NOTE — TELEPHONE ENCOUNTER
Spoke c pt. Informed pt of 1100 arrival time for 04/30/25 surgery at the [facility]. Reminded pt of NPO status. She reports a wearable glucose monitor & wanted to know if she should remove. Advised that this can be discussed c Anesthesia team in pre-op tomorrow. Pt expressed understanding & was thankful.

## 2025-04-30 ENCOUNTER — HOSPITAL ENCOUNTER (OUTPATIENT)
Facility: HOSPITAL | Age: 75
Discharge: HOME | End: 2025-05-01
Attending: ORTHOPAEDIC SURGERY | Admitting: ORTHOPAEDIC SURGERY
Payer: MEDICARE

## 2025-04-30 ENCOUNTER — ANESTHESIA (OUTPATIENT)
Dept: SURGERY | Facility: HOSPITAL | Age: 75
End: 2025-04-30
Payer: MEDICARE

## 2025-04-30 DIAGNOSIS — M79.609 PAIN IN EXTREMITY, UNSPECIFIED EXTREMITY: ICD-10-CM

## 2025-04-30 DIAGNOSIS — M17.11 PRIMARY OSTEOARTHRITIS OF RIGHT KNEE: Primary | ICD-10-CM

## 2025-04-30 DIAGNOSIS — Z01.818 PRE-OP TESTING: ICD-10-CM

## 2025-04-30 DIAGNOSIS — E03.9 HYPOTHYROIDISM, UNSPECIFIED TYPE: ICD-10-CM

## 2025-04-30 LAB
POCT GLUCOSE: 203 MG/DL (ref 70–110)
POCT GLUCOSE: 290 MG/DL (ref 70–110)
POCT GLUCOSE: 299 MG/DL (ref 70–110)

## 2025-04-30 PROCEDURE — 25000003 PHARM REV CODE 250: Performed by: NURSE ANESTHETIST, CERTIFIED REGISTERED

## 2025-04-30 PROCEDURE — C1776 JOINT DEVICE (IMPLANTABLE): HCPCS | Performed by: ORTHOPAEDIC SURGERY

## 2025-04-30 PROCEDURE — 25000003 PHARM REV CODE 250: Performed by: PHYSICIAN ASSISTANT

## 2025-04-30 PROCEDURE — 63600175 PHARM REV CODE 636 W HCPCS: Performed by: ANESTHESIOLOGY

## 2025-04-30 PROCEDURE — 71000033 HC RECOVERY, INTIAL HOUR: Performed by: ORTHOPAEDIC SURGERY

## 2025-04-30 PROCEDURE — 97535 SELF CARE MNGMENT TRAINING: CPT

## 2025-04-30 PROCEDURE — C1713 ANCHOR/SCREW BN/BN,TIS/BN: HCPCS | Performed by: ORTHOPAEDIC SURGERY

## 2025-04-30 PROCEDURE — 63600175 PHARM REV CODE 636 W HCPCS: Performed by: PHYSICIAN ASSISTANT

## 2025-04-30 PROCEDURE — 94799 UNLISTED PULMONARY SVC/PX: CPT

## 2025-04-30 PROCEDURE — 37000008 HC ANESTHESIA 1ST 15 MINUTES: Performed by: ORTHOPAEDIC SURGERY

## 2025-04-30 PROCEDURE — 27447 TOTAL KNEE ARTHROPLASTY: CPT | Mod: 82,RT,, | Performed by: ORTHOPAEDIC SURGERY

## 2025-04-30 PROCEDURE — 36000710: Performed by: ORTHOPAEDIC SURGERY

## 2025-04-30 PROCEDURE — 37000009 HC ANESTHESIA EA ADD 15 MINS: Performed by: ORTHOPAEDIC SURGERY

## 2025-04-30 PROCEDURE — 27201423 OPTIME MED/SURG SUP & DEVICES STERILE SUPPLY: Performed by: ORTHOPAEDIC SURGERY

## 2025-04-30 PROCEDURE — 97165 OT EVAL LOW COMPLEX 30 MIN: CPT

## 2025-04-30 PROCEDURE — 36000711: Performed by: ORTHOPAEDIC SURGERY

## 2025-04-30 PROCEDURE — 71000039 HC RECOVERY, EACH ADD'L HOUR: Performed by: ORTHOPAEDIC SURGERY

## 2025-04-30 PROCEDURE — 63600175 PHARM REV CODE 636 W HCPCS: Performed by: ORTHOPAEDIC SURGERY

## 2025-04-30 PROCEDURE — 25000003 PHARM REV CODE 250: Performed by: STUDENT IN AN ORGANIZED HEALTH CARE EDUCATION/TRAINING PROGRAM

## 2025-04-30 PROCEDURE — 63600175 PHARM REV CODE 636 W HCPCS: Performed by: NURSE ANESTHETIST, CERTIFIED REGISTERED

## 2025-04-30 PROCEDURE — 99900035 HC TECH TIME PER 15 MIN (STAT)

## 2025-04-30 PROCEDURE — 94761 N-INVAS EAR/PLS OXIMETRY MLT: CPT

## 2025-04-30 PROCEDURE — 82962 GLUCOSE BLOOD TEST: CPT | Performed by: ORTHOPAEDIC SURGERY

## 2025-04-30 PROCEDURE — 97162 PT EVAL MOD COMPLEX 30 MIN: CPT

## 2025-04-30 PROCEDURE — 63600175 PHARM REV CODE 636 W HCPCS: Performed by: STUDENT IN AN ORGANIZED HEALTH CARE EDUCATION/TRAINING PROGRAM

## 2025-04-30 PROCEDURE — 27447 TOTAL KNEE ARTHROPLASTY: CPT | Mod: RT,,, | Performed by: ORTHOPAEDIC SURGERY

## 2025-04-30 DEVICE — PATELLA
Type: IMPLANTABLE DEVICE | Site: KNEE | Status: FUNCTIONAL
Brand: TRIATHLON

## 2025-04-30 DEVICE — UNIVERSAL TIBIAL BASEPLATE
Type: IMPLANTABLE DEVICE | Site: KNEE | Status: FUNCTIONAL
Brand: TRIATHLON

## 2025-04-30 DEVICE — TIBIAL BEARING INSERT
Type: IMPLANTABLE DEVICE | Site: KNEE | Status: FUNCTIONAL
Brand: TRIATHLON

## 2025-04-30 DEVICE — SIMPLEX® HV WITH GENTAMICIN IS A FAST-SETTING ACRYLIC RESIN WITH ADDITION OF GENTAMICIN SULFATE FOR USE IN BONE SURGERY. MIXING THE TWO SEPARATE STERILE COMPONENTS PRODUCES A DUCTILE BONE CEMENT WHICH, AFTER HARDENING, FIXES THE IMPLANT AND TRANSFERS STRESSES PRODUCED DURING MOVEMENT EVENLY TO THE BONE. SIMPLEX® HV WITH GENTAMICINN CEMENT POWDER ALSO CONTAINS INSOLUBLE ZIRCONIUM DIOXIDE AS AN X-RAY CONTRAST MEDIUM. SIMPLEX® HV WITH GENTAMICIN DOES NOT EMIT A SIGNAL AND DOES NOT POSE A SAFETY RISK IN A MAGNETIC RESONANCE ENVIRONMENT.
Type: IMPLANTABLE DEVICE | Site: KNEE | Status: FUNCTIONAL
Brand: SIMPLEX HV WITH GENTAMICIN

## 2025-04-30 DEVICE — POSTERIOR STABILIZED FEMORAL
Type: IMPLANTABLE DEVICE | Site: KNEE | Status: FUNCTIONAL
Brand: TRIATHLON

## 2025-04-30 RX ORDER — OXYCODONE HYDROCHLORIDE 10 MG/1
10 TABLET ORAL
Status: DISCONTINUED | OUTPATIENT
Start: 2025-04-30 | End: 2025-05-01 | Stop reason: HOSPADM

## 2025-04-30 RX ORDER — INSULIN ASPART 100 [IU]/ML
0-10 INJECTION, SOLUTION INTRAVENOUS; SUBCUTANEOUS
Status: DISCONTINUED | OUTPATIENT
Start: 2025-04-30 | End: 2025-05-01 | Stop reason: HOSPADM

## 2025-04-30 RX ORDER — BUPIVACAINE HYDROCHLORIDE 5 MG/ML
INJECTION, SOLUTION EPIDURAL; INTRACAUDAL; PERINEURAL
Status: DISCONTINUED | OUTPATIENT
Start: 2025-04-30 | End: 2025-04-30 | Stop reason: HOSPADM

## 2025-04-30 RX ORDER — GLUCAGON 1 MG
1 KIT INJECTION
Status: DISCONTINUED | OUTPATIENT
Start: 2025-04-30 | End: 2025-05-01 | Stop reason: HOSPADM

## 2025-04-30 RX ORDER — LIDOCAINE HYDROCHLORIDE 10 MG/ML
1 INJECTION, SOLUTION EPIDURAL; INFILTRATION; INTRACAUDAL; PERINEURAL
Status: DISCONTINUED | OUTPATIENT
Start: 2025-04-30 | End: 2025-04-30 | Stop reason: HOSPADM

## 2025-04-30 RX ORDER — SODIUM CHLORIDE 9 MG/ML
INJECTION, SOLUTION INTRAVENOUS
Status: COMPLETED | OUTPATIENT
Start: 2025-04-30 | End: 2025-04-30

## 2025-04-30 RX ORDER — CEFAZOLIN 2 G/1
2 INJECTION, POWDER, FOR SOLUTION INTRAMUSCULAR; INTRAVENOUS
Status: COMPLETED | OUTPATIENT
Start: 2025-04-30 | End: 2025-05-01

## 2025-04-30 RX ORDER — ACETAMINOPHEN 500 MG
1000 TABLET ORAL
Status: COMPLETED | OUTPATIENT
Start: 2025-04-30 | End: 2025-04-30

## 2025-04-30 RX ORDER — GLIMEPIRIDE 1 MG/1
2 TABLET ORAL EVERY 12 HOURS
Status: DISCONTINUED | OUTPATIENT
Start: 2025-04-30 | End: 2025-05-01 | Stop reason: HOSPADM

## 2025-04-30 RX ORDER — INSULIN ASPART 100 [IU]/ML
6 INJECTION, SOLUTION INTRAVENOUS; SUBCUTANEOUS
Status: COMPLETED | OUTPATIENT
Start: 2025-04-30 | End: 2025-04-30

## 2025-04-30 RX ORDER — TALC
6 POWDER (GRAM) TOPICAL NIGHTLY PRN
Status: DISCONTINUED | OUTPATIENT
Start: 2025-04-30 | End: 2025-04-30 | Stop reason: HOSPADM

## 2025-04-30 RX ORDER — MIDAZOLAM HYDROCHLORIDE 1 MG/ML
4 INJECTION, SOLUTION INTRAMUSCULAR; INTRAVENOUS
Status: DISCONTINUED | OUTPATIENT
Start: 2025-04-30 | End: 2025-04-30 | Stop reason: HOSPADM

## 2025-04-30 RX ORDER — ELECTROLYTES/DEXTROSE
400 SOLUTION, ORAL ORAL
Status: DISCONTINUED | OUTPATIENT
Start: 2025-04-30 | End: 2025-05-01 | Stop reason: HOSPADM

## 2025-04-30 RX ORDER — CELECOXIB 200 MG/1
400 CAPSULE ORAL ONCE
Status: DISCONTINUED | OUTPATIENT
Start: 2025-04-30 | End: 2025-04-30 | Stop reason: HOSPADM

## 2025-04-30 RX ORDER — OXYCODONE HYDROCHLORIDE 5 MG/1
5 TABLET ORAL
Status: DISCONTINUED | OUTPATIENT
Start: 2025-04-30 | End: 2025-05-01 | Stop reason: HOSPADM

## 2025-04-30 RX ORDER — ONDANSETRON HYDROCHLORIDE 2 MG/ML
4 INJECTION, SOLUTION INTRAVENOUS EVERY 8 HOURS PRN
Status: DISCONTINUED | OUTPATIENT
Start: 2025-04-30 | End: 2025-05-01 | Stop reason: HOSPADM

## 2025-04-30 RX ORDER — PROPOFOL 10 MG/ML
VIAL (ML) INTRAVENOUS CONTINUOUS PRN
Status: DISCONTINUED | OUTPATIENT
Start: 2025-04-30 | End: 2025-04-30

## 2025-04-30 RX ORDER — AMOXICILLIN 250 MG
1 CAPSULE ORAL 2 TIMES DAILY
Status: DISCONTINUED | OUTPATIENT
Start: 2025-04-30 | End: 2025-05-01 | Stop reason: HOSPADM

## 2025-04-30 RX ORDER — PROCHLORPERAZINE EDISYLATE 5 MG/ML
5 INJECTION INTRAMUSCULAR; INTRAVENOUS EVERY 6 HOURS PRN
Status: DISCONTINUED | OUTPATIENT
Start: 2025-04-30 | End: 2025-05-01 | Stop reason: HOSPADM

## 2025-04-30 RX ORDER — FENTANYL CITRATE 50 UG/ML
100 INJECTION, SOLUTION INTRAMUSCULAR; INTRAVENOUS
Status: DISCONTINUED | OUTPATIENT
Start: 2025-04-30 | End: 2025-04-30 | Stop reason: HOSPADM

## 2025-04-30 RX ORDER — MUPIROCIN 20 MG/G
1 OINTMENT TOPICAL 2 TIMES DAILY
Status: DISCONTINUED | OUTPATIENT
Start: 2025-04-30 | End: 2025-05-01 | Stop reason: HOSPADM

## 2025-04-30 RX ORDER — BISACODYL 10 MG/1
10 SUPPOSITORY RECTAL EVERY 12 HOURS PRN
Status: DISCONTINUED | OUTPATIENT
Start: 2025-04-30 | End: 2025-05-01 | Stop reason: HOSPADM

## 2025-04-30 RX ORDER — IBUPROFEN 200 MG
24 TABLET ORAL
Status: DISCONTINUED | OUTPATIENT
Start: 2025-04-30 | End: 2025-05-01 | Stop reason: HOSPADM

## 2025-04-30 RX ORDER — FAMOTIDINE 10 MG/ML
INJECTION, SOLUTION INTRAVENOUS
Status: DISCONTINUED | OUTPATIENT
Start: 2025-04-30 | End: 2025-04-30

## 2025-04-30 RX ORDER — SODIUM CHLORIDE 0.9 % (FLUSH) 0.9 %
3 SYRINGE (ML) INJECTION
Status: DISCONTINUED | OUTPATIENT
Start: 2025-04-30 | End: 2025-04-30 | Stop reason: HOSPADM

## 2025-04-30 RX ORDER — ONDANSETRON HYDROCHLORIDE 2 MG/ML
INJECTION, SOLUTION INTRAVENOUS
Status: DISCONTINUED | OUTPATIENT
Start: 2025-04-30 | End: 2025-04-30

## 2025-04-30 RX ORDER — LIDOCAINE HYDROCHLORIDE 20 MG/ML
INJECTION INTRAVENOUS
Status: DISCONTINUED | OUTPATIENT
Start: 2025-04-30 | End: 2025-04-30

## 2025-04-30 RX ORDER — FAMOTIDINE 20 MG/1
20 TABLET, FILM COATED ORAL 2 TIMES DAILY
Status: DISCONTINUED | OUTPATIENT
Start: 2025-04-30 | End: 2025-05-01 | Stop reason: HOSPADM

## 2025-04-30 RX ORDER — METHOCARBAMOL 750 MG/1
750 TABLET, FILM COATED ORAL 3 TIMES DAILY
Status: DISCONTINUED | OUTPATIENT
Start: 2025-04-30 | End: 2025-05-01 | Stop reason: HOSPADM

## 2025-04-30 RX ORDER — SODIUM CHLORIDE 9 MG/ML
INJECTION, SOLUTION INTRAVENOUS CONTINUOUS
Status: DISCONTINUED | OUTPATIENT
Start: 2025-04-30 | End: 2025-04-30

## 2025-04-30 RX ORDER — KETAMINE HCL IN 0.9 % NACL 50 MG/5 ML
SYRINGE (ML) INTRAVENOUS
Status: DISCONTINUED | OUTPATIENT
Start: 2025-04-30 | End: 2025-04-30

## 2025-04-30 RX ORDER — ROPIVACAINE HYDROCHLORIDE 2 MG/ML
INJECTION, SOLUTION EPIDURAL; INFILTRATION; PERINEURAL CONTINUOUS
Status: DISCONTINUED | OUTPATIENT
Start: 2025-04-30 | End: 2025-05-01 | Stop reason: HOSPADM

## 2025-04-30 RX ORDER — METFORMIN HYDROCHLORIDE 500 MG/1
1000 TABLET ORAL 2 TIMES DAILY WITH MEALS
Status: DISCONTINUED | OUTPATIENT
Start: 2025-04-30 | End: 2025-05-01 | Stop reason: HOSPADM

## 2025-04-30 RX ORDER — POLYETHYLENE GLYCOL 3350 17 G/17G
17 POWDER, FOR SOLUTION ORAL DAILY
Status: DISCONTINUED | OUTPATIENT
Start: 2025-05-01 | End: 2025-05-01 | Stop reason: HOSPADM

## 2025-04-30 RX ORDER — MORPHINE SULFATE 2 MG/ML
2 INJECTION, SOLUTION INTRAMUSCULAR; INTRAVENOUS
Status: DISCONTINUED | OUTPATIENT
Start: 2025-04-30 | End: 2025-05-01 | Stop reason: HOSPADM

## 2025-04-30 RX ORDER — MUPIROCIN 20 MG/G
1 OINTMENT TOPICAL
Status: COMPLETED | OUTPATIENT
Start: 2025-04-30 | End: 2025-04-30

## 2025-04-30 RX ORDER — INSULIN GLARGINE 100 [IU]/ML
15 INJECTION, SOLUTION SUBCUTANEOUS NIGHTLY
Status: DISCONTINUED | OUTPATIENT
Start: 2025-04-30 | End: 2025-05-01 | Stop reason: HOSPADM

## 2025-04-30 RX ORDER — FENTANYL CITRATE 50 UG/ML
25 INJECTION, SOLUTION INTRAMUSCULAR; INTRAVENOUS EVERY 5 MIN PRN
Status: COMPLETED | OUTPATIENT
Start: 2025-04-30 | End: 2025-04-30

## 2025-04-30 RX ORDER — IBUPROFEN 200 MG
16 TABLET ORAL
Status: DISCONTINUED | OUTPATIENT
Start: 2025-04-30 | End: 2025-05-01 | Stop reason: HOSPADM

## 2025-04-30 RX ORDER — ASPIRIN 81 MG/1
81 TABLET ORAL 2 TIMES DAILY
Status: DISCONTINUED | OUTPATIENT
Start: 2025-04-30 | End: 2025-05-01 | Stop reason: HOSPADM

## 2025-04-30 RX ORDER — SODIUM CHLORIDE 0.9 % (FLUSH) 0.9 %
10 SYRINGE (ML) INJECTION
Status: DISCONTINUED | OUTPATIENT
Start: 2025-04-30 | End: 2025-04-30 | Stop reason: HOSPADM

## 2025-04-30 RX ORDER — LEVOTHYROXINE SODIUM 50 UG/1
50 TABLET ORAL
Status: DISCONTINUED | OUTPATIENT
Start: 2025-05-01 | End: 2025-05-01 | Stop reason: HOSPADM

## 2025-04-30 RX ORDER — ONDANSETRON HYDROCHLORIDE 2 MG/ML
4 INJECTION, SOLUTION INTRAVENOUS ONCE AS NEEDED
Status: COMPLETED | OUTPATIENT
Start: 2025-04-30 | End: 2025-04-30

## 2025-04-30 RX ORDER — NALOXONE HCL 0.4 MG/ML
0.02 VIAL (ML) INJECTION
Status: DISCONTINUED | OUTPATIENT
Start: 2025-04-30 | End: 2025-05-01 | Stop reason: HOSPADM

## 2025-04-30 RX ORDER — VANCOMYCIN HYDROCHLORIDE 1 G/20ML
INJECTION, POWDER, LYOPHILIZED, FOR SOLUTION INTRAVENOUS
Status: DISCONTINUED | OUTPATIENT
Start: 2025-04-30 | End: 2025-04-30 | Stop reason: HOSPADM

## 2025-04-30 RX ORDER — PREGABALIN 75 MG/1
75 CAPSULE ORAL
Status: COMPLETED | OUTPATIENT
Start: 2025-04-30 | End: 2025-04-30

## 2025-04-30 RX ORDER — ACETAMINOPHEN 500 MG
1000 TABLET ORAL EVERY 6 HOURS
Status: DISCONTINUED | OUTPATIENT
Start: 2025-04-30 | End: 2025-05-01 | Stop reason: HOSPADM

## 2025-04-30 RX ORDER — SODIUM CHLORIDE 9 MG/ML
INJECTION, SOLUTION INTRAVENOUS CONTINUOUS PRN
Status: DISCONTINUED | OUTPATIENT
Start: 2025-04-30 | End: 2025-04-30

## 2025-04-30 RX ORDER — PREGABALIN 75 MG/1
75 CAPSULE ORAL NIGHTLY
Status: DISCONTINUED | OUTPATIENT
Start: 2025-04-30 | End: 2025-05-01 | Stop reason: HOSPADM

## 2025-04-30 RX ORDER — DEXAMETHASONE SODIUM PHOSPHATE 4 MG/ML
INJECTION, SOLUTION INTRA-ARTICULAR; INTRALESIONAL; INTRAMUSCULAR; INTRAVENOUS; SOFT TISSUE
Status: DISCONTINUED | OUTPATIENT
Start: 2025-04-30 | End: 2025-04-30

## 2025-04-30 RX ORDER — CEFAZOLIN 2 G/1
2 INJECTION, POWDER, FOR SOLUTION INTRAMUSCULAR; INTRAVENOUS
Status: COMPLETED | OUTPATIENT
Start: 2025-04-30 | End: 2025-04-30

## 2025-04-30 RX ORDER — ATORVASTATIN CALCIUM 10 MG/1
10 TABLET, FILM COATED ORAL DAILY
Status: DISCONTINUED | OUTPATIENT
Start: 2025-05-01 | End: 2025-05-01 | Stop reason: HOSPADM

## 2025-04-30 RX ADMIN — PROPOFOL 100 MCG/KG/MIN: 10 INJECTION, EMULSION INTRAVENOUS at 10:04

## 2025-04-30 RX ADMIN — PREGABALIN 75 MG: 75 CAPSULE ORAL at 08:04

## 2025-04-30 RX ADMIN — SENNOSIDES AND DOCUSATE SODIUM 1 TABLET: 50; 8.6 TABLET ORAL at 08:04

## 2025-04-30 RX ADMIN — PROCHLORPERAZINE EDISYLATE 5 MG: 5 INJECTION INTRAMUSCULAR; INTRAVENOUS at 03:04

## 2025-04-30 RX ADMIN — Medication: at 01:04

## 2025-04-30 RX ADMIN — MUPIROCIN 1 G: 20 OINTMENT TOPICAL at 08:04

## 2025-04-30 RX ADMIN — SODIUM CHLORIDE, SODIUM GLUCONATE, SODIUM ACETATE, POTASSIUM CHLORIDE, MAGNESIUM CHLORIDE, SODIUM PHOSPHATE, DIBASIC, AND POTASSIUM PHOSPHATE: .53; .5; .37; .037; .03; .012; .00082 INJECTION, SOLUTION INTRAVENOUS at 12:04

## 2025-04-30 RX ADMIN — DEXAMETHASONE SODIUM PHOSPHATE 8 MG: 4 INJECTION, SOLUTION INTRAMUSCULAR; INTRAVENOUS at 10:04

## 2025-04-30 RX ADMIN — SODIUM CHLORIDE: 9 INJECTION, SOLUTION INTRAVENOUS at 08:04

## 2025-04-30 RX ADMIN — ACETAMINOPHEN 1000 MG: 500 TABLET ORAL at 06:04

## 2025-04-30 RX ADMIN — FAMOTIDINE 20 MG: 20 TABLET, FILM COATED ORAL at 08:04

## 2025-04-30 RX ADMIN — MEPIVACAINE HYDROCHLORIDE 3.2 ML: 15 INJECTION, SOLUTION EPIDURAL; INFILTRATION at 10:04

## 2025-04-30 RX ADMIN — MIDAZOLAM 1 MG: 1 INJECTION INTRAMUSCULAR; INTRAVENOUS at 09:04

## 2025-04-30 RX ADMIN — OXYCODONE HYDROCHLORIDE 10 MG: 10 TABLET ORAL at 06:04

## 2025-04-30 RX ADMIN — SODIUM CHLORIDE 1000 MG: 9 INJECTION, SOLUTION INTRAVENOUS at 08:04

## 2025-04-30 RX ADMIN — ONDANSETRON 4 MG: 2 INJECTION INTRAMUSCULAR; INTRAVENOUS at 10:04

## 2025-04-30 RX ADMIN — Medication 10 MG: at 12:04

## 2025-04-30 RX ADMIN — Medication 10 MG: at 11:04

## 2025-04-30 RX ADMIN — METHOCARBAMOL 750 MG: 750 TABLET ORAL at 08:04

## 2025-04-30 RX ADMIN — PROPOFOL 30 MG: 10 INJECTION, EMULSION INTRAVENOUS at 10:04

## 2025-04-30 RX ADMIN — SODIUM CHLORIDE: 0.9 INJECTION, SOLUTION INTRAVENOUS at 08:04

## 2025-04-30 RX ADMIN — ROPIVACAINE HYDROCHLORIDE 7.5 ML: 5 INJECTION EPIDURAL; INFILTRATION; PERINEURAL at 09:04

## 2025-04-30 RX ADMIN — FAMOTIDINE 20 MG: 10 INJECTION, SOLUTION INTRAVENOUS at 10:04

## 2025-04-30 RX ADMIN — FENTANYL CITRATE 25 MCG: 50 INJECTION INTRAMUSCULAR; INTRAVENOUS at 01:04

## 2025-04-30 RX ADMIN — FENTANYL CITRATE 50 MCG: 50 INJECTION INTRAMUSCULAR; INTRAVENOUS at 09:04

## 2025-04-30 RX ADMIN — OXYCODONE 5 MG: 5 TABLET ORAL at 01:04

## 2025-04-30 RX ADMIN — CEFAZOLIN 2 G: 2 INJECTION, POWDER, FOR SOLUTION INTRAMUSCULAR; INTRAVENOUS at 10:04

## 2025-04-30 RX ADMIN — MIDAZOLAM 1 MG: 1 INJECTION INTRAMUSCULAR; INTRAVENOUS at 10:04

## 2025-04-30 RX ADMIN — TRANEXAMIC ACID 1000 MG: 100 INJECTION INTRAVENOUS at 10:04

## 2025-04-30 RX ADMIN — CEFAZOLIN 2 G: 2 INJECTION, POWDER, FOR SOLUTION INTRAMUSCULAR; INTRAVENOUS at 06:04

## 2025-04-30 RX ADMIN — ACETAMINOPHEN 1000 MG: 500 TABLET ORAL at 08:04

## 2025-04-30 RX ADMIN — ACETAMINOPHEN 1000 MG: 500 TABLET ORAL at 11:04

## 2025-04-30 RX ADMIN — GLIMEPIRIDE 2 MG: 1 TABLET ORAL at 08:04

## 2025-04-30 RX ADMIN — Medication 10 MG: at 10:04

## 2025-04-30 RX ADMIN — METFORMIN HYDROCHLORIDE 1000 MG: 500 TABLET ORAL at 06:04

## 2025-04-30 RX ADMIN — MORPHINE SULFATE 2 MG: 2 INJECTION, SOLUTION INTRAMUSCULAR; INTRAVENOUS at 02:04

## 2025-04-30 RX ADMIN — OXYCODONE 5 MG: 5 TABLET ORAL at 11:04

## 2025-04-30 RX ADMIN — FENTANYL CITRATE 25 MCG: 50 INJECTION INTRAMUSCULAR; INTRAVENOUS at 02:04

## 2025-04-30 RX ADMIN — LIDOCAINE HYDROCHLORIDE 60 MG: 20 INJECTION INTRAVENOUS at 10:04

## 2025-04-30 RX ADMIN — ASPIRIN 81 MG: 81 TABLET, COATED ORAL at 08:04

## 2025-04-30 RX ADMIN — INSULIN GLARGINE 15 UNITS: 100 INJECTION, SOLUTION SUBCUTANEOUS at 08:04

## 2025-04-30 RX ADMIN — TRANEXAMIC ACID 1000 MG: 100 INJECTION INTRAVENOUS at 12:04

## 2025-04-30 RX ADMIN — Medication 400 ML: at 08:04

## 2025-04-30 RX ADMIN — SODIUM CHLORIDE 500 ML: 9 INJECTION, SOLUTION INTRAVENOUS at 08:04

## 2025-04-30 RX ADMIN — INSULIN ASPART 6 UNITS: 100 INJECTION, SOLUTION INTRAVENOUS; SUBCUTANEOUS at 08:04

## 2025-04-30 RX ADMIN — ONDANSETRON 4 MG: 2 INJECTION INTRAMUSCULAR; INTRAVENOUS at 02:04

## 2025-04-30 RX ADMIN — INSULIN HUMAN 6 UNITS: 100 INJECTION, SOLUTION PARENTERAL at 10:04

## 2025-04-30 NOTE — PT/OT/SLP EVAL
Physical Therapy Evaluation    Patient Name: Sagrario Freire   MRN: [id number]  Recent Surgery: Procedure(s) (LRB):  ARTHROPLASTY, KNEE, TOTAL (Right) * Day of Surgery *    Recommendations:     Discharge Recommendations: Low Intensity Therapy   Discharge Equipment Recommendations: none   Barriers to discharge: None    Assessment:     Sagrario Freire is a 74 y.o. female admitted with a medical diagnosis of s/p R TKA. She presents with the following impairments/functional limitations: weakness, impaired functional mobility, gait instability, impaired balance, decreased lower extremity function, pain, decreased ROM, orthopedic precautions. Patient tolerated PT session fair due to nausea and 10/10 right anterior knee pain. She was unable to tolerate standing or ambulating today.     Rehab Prognosis: Good; patient would benefit from acute PT services to address these deficits and reach maximum level of function.    Plan:     During this hospitalization, patient to be seen daily to address the above listed problems via gait training, therapeutic activities, therapeutic exercises, neuromuscular re-education    Plan of Care Expires: 05/02/25    Subjective     Chief Complaint: Nausea once sitting on edge of bed. 10/10 right anterior knee pain.  Patient Comments/Goals: To walk without pain.   Pain/Comfort:  Pain Rating 1: 10/10  Location - Side 1: Right  Location - Orientation 1: anterior  Location 1: knee  Pain Addressed 1: Pre-medicate for activity, Reposition, Distraction, Cessation of Activity, Nurse notified    Social History:  Living Environment: Patient lives with her  in a [facility] with 1 step to enter.   Prior Level of Function: Prior to admission, patient was independent for functional mobility.   Equipment at Home: walker, rolling, bedside commode, shower chair  Assistance Upon Discharge:      Objective:     Communicated with Rn prior to session. Patient found supine with cryotherapy, perineural  catheter, FCD upon PT entry to room.  present in room.    General Precautions: Standard, fall   Orthopedic Precautions: RLE weight bearing as tolerated   Braces: N/A    Respiratory Status: Room air    Exams:  RLE ROM: WFL except limited at knee due to pain  RLE Strength:  appears WFL but did not formally assess due to pain and recent surgery  LLE ROM: WFL  LLE Strength: WFL  Cognitive: Patient is oriented to Person, Place, Time, Situation    Functional Mobility:  Bed Mobility  Scooting: contact guard assistance  Supine to Sit: contact guard assistance  Sit to Supine: minimum assistance for LE management  Transfers  Unable to perform  Gait  Unable to perform  Balance  Sitting: Patient sat on edge of bed ~8 minutes with minimal assistance due to nausea and right knee pain.     Therapeutic Activities and Exercises:  Patient educated in:  -PT role and POC  -safety with transfers including hand placement  -gait sequencing and RW management  -out of bed activity to maximize recovery including ambulating with nursing staff assistance and RW while in the hospital     AM-PAC 6 CLICK MOBILITY  Total Score:19    Patient left in left sidelying with all lines intact, call button in reach, RN notified, and  present.    GOALS:   Multidisciplinary Problems       Physical Therapy Goals          Problem: Physical Therapy    Goal Priority Disciplines Outcome Interventions   Physical Therapy Goal     PT, PT/OT Progressing    Description: Goals to be met by: 25     Patient will increase functional independence with mobility by performin. Supine to sit with supervision  2. Sit to stand transfer with Supervision  3. Gait x100 feet with Supervision using Rolling Walker  4. Ascend/Descend 1 curb step with Stand by assistance using Rolling Walker  5. Lower extremity exercise program x10 reps per handout, with supervision                        History:     Past Medical History:   Diagnosis Date    Abnormal ECG      "Frequently monitored by primary per pt    Diabetes mellitus, type 2     GERD (gastroesophageal reflux disease)     Hypothyroidism        Past Surgical History:   Procedure Laterality Date    ABDOMINAL HERNIA REPAIR      BELT ABDOMINOPLASTY      COLECTOMY      partial     hand operation Right     "scraped the tendon"    TOTAL KNEE ARTHROPLASTY Left 4/14/2021    Procedure: ARTHROPLASTY, KNEE, TOTAL;  Surgeon: SANTO Hector MD;  Location: [facility];  Service: Orthopedics;  Laterality: Left;  outpatient with 23hr observation  Regional with catheter- spinal & adductor  pericapsular injection: 30cc VITO injection       Time Tracking:     PT Received On: 04/30/25  PT Start Time: 1500  PT Stop Time: 1511  PT Total Time (min): 11 min     Billable Minutes: Evaluation 11    4/30/2025  "

## 2025-04-30 NOTE — NURSING TRANSFER
Nursing Transfer Note      4/30/2025   2:37 PM    Nurse giving handoff:Migdalia Hickey  Nurse receiving handoff:Mirlande Hickey    Reason patient is being transferred: admit    Transfer To: 3rd floor    Transfer via stretcher    Transfer with pain pump    Transported by PCT    Medicines sent: ropivacaine    Any special needs or follow-up needed: N/A    Patient belongings transferred with patient: Yes    Chart send with patient: Yes    Notified: spouse    Patient reassessed at: 4/30/25 1410    Upon arrival to floor: patient oriented to room, call bell in reach, and bed in lowest position

## 2025-04-30 NOTE — BRIEF OP NOTE
[facility])  Brief Operative Note    SUMMARY     Surgery Date: 4/30/2025     Surgeons and Role:     * SANTO Hector MD - Primary    Assisting Surgeon: None    Pre-op Diagnosis:  Primary osteoarthritis of right knee [M17.11]    Post-op Diagnosis:  Post-Op Diagnosis Codes:     * Primary osteoarthritis of right knee [M17.11]    Procedure(s) (LRB):  ARTHROPLASTY, KNEE, TOTAL (Right)    Anesthesia: Spinal    Implants:  Implant Name Type Inv. Item Serial No.  Lot No. LRB No. Used Action   CEMENT BONE SMPLX HV GENTMYCN - [id number]  CEMENT BONE SMPLX HV GENTMYCN  JOSE [employer]. [id number] Right 1 Implanted   CEMENT BONE SMPLX HV GENTMYCN - [id number]  CEMENT BONE SMPLX HV GENTMYCN  JO[employer] D[employer]. [id number] Right 1 Implanted   BASEPLATE TRIATHLON TS SZ4 - [id number]  BASEPLATE TRIATHLON TS SZ4  JOSE [employer] DUR[employer]. [id number] Right 1 Implanted   PATELLA TRIATHLON 33X9 SYMTRC - [id number]  PATELLA TRIATHLON 33X9 SYMTRC  JO[employer] DUR[employer]. [id number] Right 1 Implanted   COMP FEM POST STAB ADOLFO SZ 4 RT - [id number]  COMP FEM POST STAB ADOLFO SZ 4 RT  JO[employer] DUR[employer]. [id number] Right 1 Implanted   INSERT TRIATH TIB PS SZ4 9MM - [id number]  INSERT TRIATH TIB PS SZ4 9MM  [employer] D[employer]. [id number] Right 1 Implanted       Operative Findings: knee DJD    Estimated Blood Loss: * No values recorded between 4/30/2025 10:38 AM and 4/30/2025  1:05 PM *    Estimated Blood Loss has been documented.         Specimens:   Specimen (24h ago, onward)      None          * No specimens in log *    [id number]

## 2025-04-30 NOTE — OPERATIVE NOTE ADDENDUM
Certification of Assistant at Surgery       Surgery Date: 4/30/2025     Participating Surgeons:  Surgeons and Role:     * SANTO Hector MD - Primary     * Lucy Rodríguez -  Fellow       Procedures:  Procedure(s) (LRB):  ARTHROPLASTY, KNEE, TOTAL (Right)    Assistant Surgeon's Certification of Necessity:  I understand that section 1842 (b) (6) (d) of the Social Security Act generally prohibits Medicare Part B reasonable charge payment for the services of assistants at surgery in teaching hospitals when qualified residents are available to furnish such services. I certify that the services for which payment is claimed were medically necessary, and that no qualified resident was available to perform the services. I further understand that these services are subject to post-payment review by the Medicare carrier.      Lucy Rodríguez MD    04/30/2025  3:34 PM

## 2025-04-30 NOTE — OP NOTE
[facility]   OPERATIVE REPORT   ORTHOPAEDIC SURGERY   PROVIDER: DR. MELANIE MERCER    PATIENT INFORMATION   Sagrario Freire 74 y.o. female 1950   MRN: [id number]   LOCATION: [facility]     DATE OF PROCEDURE: 4/30/2025     PREOPERATIVE DIAGNOSES:   Right knee advanced degenerative arthritis    POSTOPERATIVE DIAGNOSES:   Right knee advanced degenerative arthritis    PROCEDURES PERFORMED:   Right total knee arthroplasty (CPT 40715)    SURGEON: MELANIE Mercer MD     ASSISTANTS:  Lucy Rodríguez MD - Fellow - First Assist  SMA Emi     First Assistant Duties: Due to the complexity of the case and the need for significant intra-operative decision making, first assistant duties were medically necessary. The chronicity of the disease process and the level of deformity dictated that first assistant duties be undertaken. Assistance was provided for joint exposure, manipulation, and retractor placement. There was no qualified resident available for the procedure.    ANESTHESIA: Spinal with adductor catheter and local anesthetic injection (0.5% Marcaine)    ESTIMATED BLOOD LOSS:  75 cc    IMPLANTS:   Implant Name Type Inv. Item Serial No.  Lot No. LRB No. Used Action   CEMENT BONE SMPLX HV GENTMYCN - [id number]  CEMENT BONE SMPLX HV GENTMYCN  JOSE A [employer] DURGA. [id number] Right 1 Implanted   CEMENT BONE SMPLX HV GENTMYCN - [id number]  CEMENT BONE SMPLX HV GENTMYCN  [employer]. [id number] Right 1 Implanted   BASEPLATE TRIATHLON TS SZ4 - [id number]  BASEPLATE TRIATHLON TS SZ4  JOSE A [employer] DURGA. [id number] Right 1 Implanted   PATELLA TRIATHLON 33X9 SYMTRC - [id number]  PATELLA TRIATHLON 33X9 SYMTRC  JOSE [employer]UR[employer]. [id number] Right 1 Implanted   COMP FEM POST STAB ADOLFO SZ 4 RT - [id number]  COMP FEM POST STAB ADOLFO SZ 4 RT  [employer] D[employer]. [id number] Right 1 Implanted   INSERT TRIATH TIB PS SZ4 9MM -  [id number]  INSERT TRIATH TIB PS SZ4 9MM  [employer]. [id number] Right 1 Implanted      SPECIMENS: None.    COMPLICATIONS: None.     INTRAOPERATIVE COUNTS: Correct.     PROPHYLACTIC IV ANTIBIOTICS: Given per [facility] Protocol.    INDICATIONS FOR PROCEDURE:  Sagrario Freire is a 74 y.o. year-old with a longstanding history of right knee pain.  She has failed extensive conservative care to this point.  Preoperative imaging revealed degenerative joint disease and treatment options were discussed.  She elected to proceed with knee replacement.    Risks and complications were discussed including, but not limited to risks of anesthetic complications, infections, wound healing complications, aseptic loosening, instability, DVT, pulmonary embolism and death among others and she elected to proceed.    COMPONENTS USED:  The [employer] triathlon system with size femur 4, tibia UBP 4, 9  mm polyethylene, 33 mm patella.    DESCRIPTION OF PROCEDURE:  The patient was taken to the Operating Room where anesthesia was administered by the Anesthesia Department. She was then placed in the supine position and all superficial neurovascular structures were well padded.  The right lower extremity was then sterilely prepped and draped in the normal fashion.    Under tourniquet control, a 20 cm longitudinal incision was made over the anterior aspect of the knee.  Subcutaneous tissue was sharply dissected down to the deep fascia, which was incised along the line of the incision.  A standard medial parapatellar arthrotomy was made.  The proximal medial tibial plateau was then subperiosteally exposed protecting the medial collateral ligament.  A portion of the infrapatellar fat pad was excised.  The patella was everted and the knee was flexed to 90 degrees.    The extramedullary tibial alignment guide was then placed and the proximal tibial osteotomy was made approximately 4 mm distal to the most shallow surface of the tibial  plateau.  This was done perpendicular to the axis of the tibia with approximately 3 degrees posterior slope.    A drill was then used to gain access into the intramedullary canal of the distal femur. The distal femoral cutting guide was placed and the 8 mm distal femoral cut was made in 5 degrees of valgus.  Femur was sized to a size 4.  The size 4 cutting guide was applied and  the anterior femoral condyle, posterior condyle, and chamfer cuts were made. There was no notching anteriorly.  At this time, the cutting guide was removed and the cruciate ligaments, osteophytes, menisci and any debris was removed from the joint space. Flexion and extension gaps were checked and were found to be equal at 9 mm. The notch cutting guide for the posterior stabilized housing was placed and the notch cuts were then made.    We then turned our attention back towards the proximal tibia.  This was sized to a size 4, placed in neutral rotation, and the keel was punched in the standard fashion after drilling for the UBP.  Trial sizes of the 4 femur, 4 tibia, and 9 mm polyethylene liner were then placed.    The patellar cutting guide was then used to remove the dorsal 10 mm of the patellar surface to a final thickness of 14 mm.  This was sized to a size 33. Drill holes were placed and patellar trial was placed.    At this time, the knee was placed through range of motion.  Excellent patellofemoral tracking and stability were noted throughout.  Full extension was easily obtained and intraoperative range of motion was from approximately 0 to 130 degrees.    Trial components were removed and the bony surfaces were thoroughly irrigated in a pulse lavage fashion and dried.  Two batches of cement were mixed and the tibial, femoral and patellar components were cemented into position, impacted and held in place as the cement polymerized.  Any excess cement was removed using [place] elevators.  The 9 mm polyethylene was then locked into  position.    The wound was once again thoroughly irrigated.  The tourniquet was deflated and any significant bleeding was stopped using electrocautery.  Tranexamic acid was given intravenously pre incision and at the time of component cementation for hemostasis. The wound was irrigated with dilute betadine wash followed by normal saline via pulse lavage prior to closure.     The quadriceps tendon and parapatellar retinaculum were then closed with interrupted figure-of-eight sutures of #1 Vicryl.  Vancomycin powder was placed deep within the wound prior to arthrotomy closure.  Subcutaneous tissue was closed with interrupted inverted stitches #3-0 Vicryl.  Skin was approximated using 3-0 Monocryl suture in running subcuticular fashion followed by staples followed by Dermabond.  Sterile dressing was applied and she was returned to the Postanesthesia Care Unit in stable condition.

## 2025-04-30 NOTE — PLAN OF CARE
Patient tolerated PT session fair due to nausea and 10/10 right anterior knee pain. She was unable to tolerate standing or ambulating today.     Problem: Physical Therapy  Goal: Physical Therapy Goal  Description: Goals to be met by: 25     Patient will increase functional independence with mobility by performin. Supine to sit with supervision  2. Sit to stand transfer with Supervision  3. Gait x100 feet with Supervision using Rolling Walker  4. Ascend/Descend 1 curb step with Stand by assistance using Rolling Walker  5. Lower extremity exercise program x10 reps per handout, with supervision     Outcome: Progressing

## 2025-04-30 NOTE — PLAN OF CARE
Problem: Occupational Therapy  Goal: Occupational Therapy Goal  Description: Goals to be met by: 5/1/25     Patient will increase functional independence with ADLs by performing:    UE Dressing with Modified [place].  LE Dressing with Modified [place] and Assistive Devices as needed.  Grooming while standing at sink with Modified [place].  Toileting from bedside commode with Modified [place] for hygiene and clothing management.   Bathing from  shower chair/bench with Modified [place].  Toilet transfer to bedside commode with Modified [place].    Outcome: Progressing

## 2025-04-30 NOTE — PT/OT/SLP EVAL
"Occupational Therapy Evaluation and Treatment    Name: Sagrario Freire  MRN: [id number]  Admitting Diagnosis: <principal problem not specified> * Day of Surgery *  Recent Surgery: Procedure(s) (LRB):  ARTHROPLASTY, KNEE, TOTAL (Right) * Day of Surgery *    Recommendations:     Discharge Recommendations: Low Intensity Therapy  Level of Assistance Recommended: 24 hours supervision  Discharge Equipment Recommendations: none  Barriers to discharge: None    Assessment:     Sagrario Freire is a 74 y.o. female with a medical diagnosis of <principal problem not specified>. She presents with performance deficits affecting function including impaired self care skills, impaired functional mobility, orthopedic precautions. Pt was able to perform supine/sit T/F c S and sit/stand T/F c CGA and RW.  Was able to walk from bed to bathroom and then back bed c CGA and RW.  Pt requested to go back to bed d/t 8/10 pain and also not being able to sleep.  Performed grooming task and toilet hygiene c set-up.  Pt is progressing well.    Rehab Prognosis: Good; patient would benefit from acute OT services to address these deficits and reach maximum level of function.    Plan:     Patient to be seen daily to address the above listed problems via self-care/home management, therapeutic activities, therapeutic exercises  Plan of Care Expires: 05/01/25  Plan of Care Reviewed with: patient, spouse    Subjective     Chief Complaint: R TKA  Patient Comments/Goals: "I am hurting and I'm sleepy."  Pain/Comfort:  Pain Rating 1: 8/10    Patients cultural, spiritual, Pentec[employer]al conflicts given the current situation: no    Social History:  Living Environment: Patient lives with their spouse in a single story home with number of outside stair(s): 1 and walk-in shower  Prior Level of Function: Prior to admission, patient was independent.  Roles and Routines: Patient was driving and retired prior to admission.  Equipment Used at Home: bedside commode, " walker, rolling, shower chair  DME owned (not currently used): rolling walker, bedside commode, and shower chair  Assistance Upon Discharge: significant other    Objective:     Communicated with RN prior to session. Patient found supine with cryotherapy, FCD, peripheral IV, perineural catheter upon OT entry to room.    General Precautions: Standard, fall   Orthopedic Precautions: RLE weight bearing as tolerated   Braces: N/A    Respiratory Status: Room air    Occupational Performance    Gait belt applied - Yes    Bed Mobility:   Supine to sit from left side of bed with supervision    Functional Mobility/Transfers:  Sit <> Stand Transfer with contact guard assistance with rolling walker  Bed <> Chair Transfer using Stand Pivot technique with contact guard assistance with rolling walker  Toilet Transfer Stand Pivot technique with contact guard assistance with rolling walker and bedside commode  Functional Mobility: Pt was able to walk from bed to bathroom c CGA and RW and then walked back to bed c CGA and RW.    Activities of Daily Living:  Grooming: contact guard assistance to wash hands while standing at sink c RW.  Toileting: contact guard assistance for toilet hygiene.    Physical Exam:  Upper Extremity Range of Motion:     -       Right Upper Extremity: WFL  -       Left Upper Extremity: WFL  Upper Extremity Strength:    -       Right Upper Extremity: WFL  -       Left Upper Extremity: WFL    AMPAC 6 Click ADL:  AMPAC Total Score: 16    Treatment & Education:  Educated on the importance of mobility to maximize recovery  Educated on the importance of OOB mobility within safe range in order to decrease adverse effects of prolonged bedrest  Educated on use of hip kit during LB dressing  Educated on safety with functional mobility; hand placement to ensure safe transfers to various surfaces in prep for ADLs  Educated on performing functional mobility and ADLs in adherence to orthopedic precautions  Educated on weight  "bearing status  Will continue to educate as needed      Patient clear to ambulate to/from bathroom with RN/PCT, assist x1 .    Patient left supine with all lines intact, call button in reach, and RN notified.    GOALS:   Multidisciplinary Problems       Occupational Therapy Goals          Problem: Occupational Therapy    Goal Priority Disciplines Outcome Interventions   Occupational Therapy Goal     OT, PT/OT Progressing    Description: Goals to be met by: 5/1/25     Patient will increase functional independence with ADLs by performing:    UE Dressing with Modified [place].  LE Dressing with Modified [place] and Assistive Devices as needed.  Grooming while standing at sink with Modified [place].  Toileting from bedside commode with Modified [place] for hygiene and clothing management.   Bathing from  shower chair/bench with Modified [place].  Toilet transfer to bedside commode with Modified [place].                         DME Justifications:  No DME recommended requiring DME justifications    History:     Past Medical History:   Diagnosis Date    Abnormal ECG     Frequently monitored by primary per pt    Diabetes mellitus, type 2     GERD (gastroesophageal reflux disease)     Hypothyroidism          Past Surgical History:   Procedure Laterality Date    ABDOMINAL HERNIA REPAIR      BELT ABDOMINOPLASTY      COLECTOMY      partial     hand operation Right     "scraped the tendon"    TOTAL KNEE ARTHROPLASTY Left 4/14/2021    Procedure: ARTHROPLASTY, KNEE, TOTAL;  Surgeon: SANTO Hector MD;  Location: [facility];  Service: Orthopedics;  Laterality: Left;  outpatient with 23hr observation  Regional with catheter- spinal & adductor  pericapsular injection: 30cc VITO injection       Time Tracking:     OT Date of Treatment: 04/30/25  OT Start Time: 1610  OT Stop Time: 1635  OT Total Time (min): 25 min    Billable Minutes: Evaluation 13 and Self Care/Home Management 12    4/30/2025      "

## 2025-04-30 NOTE — ANESTHESIA PROCEDURE NOTES
Spinal    Diagnosis: osteoarthritis  Patient location during procedure: OR  Start time: 4/30/2025 10:49 AM  Timeout: 4/30/2025 10:48 AM  End time: 4/30/2025 10:50 AM    Staffing  Authorizing Provider: Pranav Sifuentes MD  Performing Provider: Pranav Sifuentes MD    Staffing  Performed by: Pranav Sifuentes MD  Authorized by: Pranav Sifuentes MD    Preanesthetic Checklist  Completed: patient identified, IV checked, site marked, risks and benefits discussed, surgical consent, monitors and equipment checked, pre-op evaluation and timeout performed  Spinal Block  Patient position: sitting  Prep: ChloraPrep  Patient monitoring: heart rate, continuous pulse ox and frequent blood pressure checks  Approach: midline  Location: L4-5  Injection technique: single shot  CSF Fluid: clear free-flowing CSF  Needle  Needle type: pencil-tip   Needle gauge: 25 G  Needle length: 3.5 in  Additional Documentation: incremental injection, negative aspiration for heme and no paresthesia on injection  Needle localization: anatomical landmarks  Assessment  Ease of block: easy  Patient's tolerance of the procedure: comfortable throughout block and no complaints  Medications:    Medications: mepivacaine (CARBOCAINE) injection 15 mg/mL (1.5%) - Intrathecal   3.2 mL - 4/30/2025 10:50:00 AM

## 2025-04-30 NOTE — PLAN OF CARE
Pre-op complete. Waiting on site lauren, H&P update, and anesthesia consent. Family at bedside. Bed locked in lowest position with call bell within reach.

## 2025-04-30 NOTE — ANESTHESIA PREPROCEDURE EVALUATION
"                                                                                                             04/30/2025  Pre-operative evaluation for Procedure(s) (LRB):  ARTHROPLASTY, KNEE, TOTAL (Right)    Sagrario Freire is a 74 y.o. female     Problem List[1]    Review of patient's allergies indicates:   Allergen Reactions    Iodinated contrast media Other (See Comments)     Hypotension, hypoxia       Medications Ordered Prior to Encounter[2]    Past Surgical History:   Procedure Laterality Date    ABDOMINAL HERNIA REPAIR      BELT ABDOMINOPLASTY      COLECTOMY      partial     hand operation Right     "scraped the tendon"    TOTAL KNEE ARTHROPLASTY Left 4/14/2021    Procedure: ARTHROPLASTY, KNEE, TOTAL;  Surgeon: SANTO Hector MD;  Location: [facility];  Service: Orthopedics;  Laterality: Left;  outpatient with 23hr observation  Regional with catheter- spinal & adductor  pericapsular injection: 30cc VITO injection       Social History[3]    EKG:  Normal sinus rhythm   Incomplete right bundle branch block   Borderline Abnormal ECG   When compared with ECG of 12-Apr-2021 15:42,   No significant change was found   Confirmed by Alvino Fall ([id number]) on 4/23/2025 2:17:42 PM     2D Echo:  No results found for this or any previous visit.        Pre-op Assessment    I have reviewed the Patient Summary Reports.     I have reviewed the Nursing Notes. I have reviewed the NPO Status.   I have reviewed the Medications.     Review of Systems  Anesthesia Hx:             Denies Family Hx of Anesthesia complications.    Denies Personal Hx of Anesthesia complications.                    Cardiovascular:  Exercise tolerance: good        Denies Dysrhythmias.   Denies Angina.       Denies BRANCH.                              Pulmonary:    Denies COPD.  Denies Asthma.    Denies Recent URI. Sleep Apnea                Renal/:  Chronic Renal Disease, CKD                Hepatic/GI:     GERD                Musculoskeletal:  Arthritis      "          Neurological:    Denies CVA.    Denies Seizures.                                Endocrine:  Diabetes, poorly controlled, type 2 Hypothyroidism              Physical Exam  General: Well nourished, Cooperative, Alert and Oriented    Airway:  Mallampati: II   Mouth Opening: Normal  Tongue: Normal  Neck ROM: Normal ROM    Dental:  Partial Dentures  Upper partial plate      Anesthesia Plan  Type of Anesthesia, risks & benefits discussed:    Anesthesia Type: Gen Natural Airway, Regional, Spinal  Intra-op Monitoring Plan: Standard ASA Monitors  Post Op Pain Control Plan: multimodal analgesia  Induction:  IV  Informed Consent: Informed consent signed with the Patient and all parties understand the risks and agree with anesthesia plan.  All questions answered. Patient consented to blood products? Yes  ASA Score: 3  Day of Surgery Review of History & Physical: H&P Update referred to the surgeon/provider.    Ready For Surgery From Anesthesia Perspective.     .           [1]   Patient Active Problem List  Diagnosis    Diabetes mellitus, type 2    GERD (gastroesophageal reflux disease)    Hypothyroidism    JENN (obstructive sleep apnea)    PONV (postoperative nausea and vomiting)    Stage 3a chronic kidney disease    Abnormal EKG    Primary osteoarthritis of left knee    Impaired gait and mobility    Chronic pain of left knee    Decreased range of motion (ROM) of left knee   [2]   Current Facility-Administered Medications on File Prior to Encounter   Medication Dose Route Frequency Provider Last Rate Last Admin    fentaNYL 50 mcg/mL injection 25 mcg  25 mcg Intravenous Q5 Min PRN Chema Burt MD   50 mcg at 04/14/21 1305    midazolam (VERSED) 1 mg/mL injection 0.5 mg  0.5 mg Intravenous PRN Chema Burt MD   2 mg at 04/14/21 1300    ropivacaine 0.2% Nimbus PainPRO Pump infusion 500 ML   Perineural Continuous Chema Burt MD   New Bag at 04/14/21 1810     Current Outpatient  Medications on File Prior to Encounter   Medication Sig Dispense Refill    acetaminophen (TYLENOL) 500 MG tablet Take 500 mg by mouth as needed for Pain.      ALPRAZolam (XANAX) 0.5 MG tablet Take 0.5 mg by mouth every 12 (twelve) hours. Take as scheduled  0    atorvastatin (LIPITOR) 10 MG tablet Take 10 mg by mouth once daily. Take as scheduled      glimepiride (AMARYL) 2 MG tablet Take 2 mg by mouth every 12 (twelve) hours. Hold morning of surgery      ibuprofen 200 mg Cap Take by mouth daily as needed.      insulin glargine U-100, Lantus, 100 unit/mL (3 mL) SubQ InPn pen Inject 15 Units into the skin.      insulin lispro 100 unit/mL pen SMARTSIG:15 Unit(s) SUB-Q 3 Times Daily      metFORMIN (GLUCOPHAGE) 1000 MG tablet Take 1,000 mg by mouth 2 (two) times daily with meals. Hold night before surgery and morning of surgery      pantoprazole (PROTONIX) 40 MG tablet Take 1 tablet by mouth every morning.      tirzepatide (MOUNJARO) 5 mg/0.5 mL PnIj Inject into the skin every 7 days.      zolpidem (AMBIEN) 10 mg Tab Take 10 mg by mouth once daily. Take as scheduled  0    aspirin (ECOTRIN) 81 MG EC tablet Take 1 tablet (81 mg total) by mouth 2 (two) times daily. (Patient taking differently: Take 81 mg by mouth once. Ok to take as scheduled) 56 tablet 0    famotidine (PEPCID) 20 MG tablet Take 20 mg by mouth once. Take as scheduled      gabapentin (NEURONTIN) 100 MG capsule Take 100 mg by mouth 3 (three) times daily.      insulin NPH-insulin regular, 70/30, (NOVOLIN 70/30) 100 unit/mL (70-30) injection Inject 10 Units into the skin 2 (two) times daily. Take 100% pm before surgery=10 units  Take 30% am of surgery=3 units      methylPREDNISolone (MEDROL DOSEPACK) 4 mg tablet use as directed 1 each 0   [3]   Social History  Socioeconomic History    Marital status:      Spouse name: Armin    Number of children: 3   Occupational History     Comment:    Tobacco Use    Smoking status: Never   Substance and Sexual  Activity    Alcohol use: Not Currently    Drug use: Never    Sexual activity: Yes   Social History Narrative    Lives in house with no steps     Social Drivers of Health     Financial Resource Strain: Low Risk  (4/23/2025)    Overall Financial Resource Strain (CARDIA)     Difficulty of Paying Living Expenses: Not very hard   Food Insecurity: No Food Insecurity (4/23/2025)    Hunger Vital Sign     Worried About Running Out of Food in the Last Year: Never true     Ran Out of Food in the Last Year: Never true   Transportation Needs: No Transportation Needs (4/23/2025)    PRAPARE - Transportation     Lack of Transportation (Medical): No     Lack of Transportation (Non-Medical): No   Physical Activity: Unknown (4/23/2025)    Exercise Vital Sign     Days of Exercise per Week: 1 day   Recent Concern: Physical Activity - Insufficiently Active (4/11/2025)    Received from [facility]    Exercise Vital Sign     Days of Exercise per Week: 1 day     Minutes of Exercise per Session: 10 min   Stress: No Stress Concern Present (4/23/2025)    [employer] - Occupational Stress Questionnaire     Feeling of Stress : Only a little   Recent Concern: Stress - Stress Concern Present (4/11/2025)    Received from [facility]    [employer] - Occupational Stress Questionnaire     Feeling of Stress : To some extent   Housing Stability: Unknown (4/23/2025)    Housing Stability Vital Sign     Unable to Pay for Housing in the Last Year: Patient declined     Homeless in the Last Year: No

## 2025-04-30 NOTE — PROGRESS NOTES
"Patient c/o "tightness in chest" vitals stable, EKG NSR. Patient states its "getting better." Patient asymptomatic. Anesthesia, Dr Tirado, aware. No further orders at this time.   "

## 2025-04-30 NOTE — TRANSFER OF CARE
"Anesthesia Transfer of Care Note    Patient: Sagrario Freire    Procedure(s) Performed: Procedure(s) (LRB):  ARTHROPLASTY, KNEE, TOTAL (Right)    Patient location: PACU    Anesthesia Type: spinal, regional and general    Transport from OR: Transported from OR on 6-10 L/min O2 by face mask with adequate spontaneous ventilation    Post pain: adequate analgesia    Post assessment: no apparent anesthetic complications and tolerated procedure well    Post vital signs: stable    Level of consciousness: awake and responds to stimulation    Nausea/Vomiting: no nausea/vomiting    Complications: none    Transfer of care protocol was followed      Last vitals: Visit Vitals  /65 (BP Location: Left arm, Patient Position: Lying)   Pulse 69   Temp 36.4 °C (97.6 °F) (Oral)   Resp 13   Ht 5' 6" (1.676 m)   Wt 64.4 kg (142 lb)   SpO2 98%   Breastfeeding No   BMI 22.92 kg/m²     "

## 2025-04-30 NOTE — NURSING
Report received from SOUMYA Negron. Care assumed. Patient arrived to unit in stretcher from PACU, AAO x 4.  Continuous telemetry initiated monitor #[id number].   Telemetry monitor tech was notified of patients arrival to unit.  An admit tele strip was placed in patients chart.  VSS, IV S/L. Dressing to (R) knee, CDI.  Pt lying supine in bed.  Pt c/o 10/10 pain, IV PRN medication given. No other concerns at this time.  See assessment. Patient oriented to room. Bed in lowest position, side rails up x2, bed wheels locked and call light within reach.  Pt instructed to call for assistance, verbalized understanding. NADN. Pt expresses no other needs at this time.

## 2025-05-01 ENCOUNTER — OFFICE VISIT (OUTPATIENT)
Dept: SPORTS MEDICINE | Facility: CLINIC | Age: 75
End: 2025-05-01
Payer: MEDICARE

## 2025-05-01 VITALS — BODY MASS INDEX: 22.92 KG/M2 | HEIGHT: 66 IN

## 2025-05-01 VITALS
HEIGHT: 66 IN | DIASTOLIC BLOOD PRESSURE: 58 MMHG | WEIGHT: 142 LBS | HEART RATE: 69 BPM | RESPIRATION RATE: 18 BRPM | BODY MASS INDEX: 22.82 KG/M2 | SYSTOLIC BLOOD PRESSURE: 120 MMHG | TEMPERATURE: 99 F | OXYGEN SATURATION: 96 %

## 2025-05-01 DIAGNOSIS — Z96.651 S/P TOTAL KNEE ARTHROPLASTY, RIGHT: Primary | ICD-10-CM

## 2025-05-01 LAB
POCT GLUCOSE: 161 MG/DL (ref 70–110)
POCT GLUCOSE: 172 MG/DL (ref 70–110)
POCT GLUCOSE: 178 MG/DL (ref 70–110)
POCT GLUCOSE: 208 MG/DL (ref 70–110)

## 2025-05-01 PROCEDURE — 63600175 PHARM REV CODE 636 W HCPCS: Performed by: PHYSICIAN ASSISTANT

## 2025-05-01 PROCEDURE — 94761 N-INVAS EAR/PLS OXIMETRY MLT: CPT

## 2025-05-01 PROCEDURE — 97535 SELF CARE MNGMENT TRAINING: CPT

## 2025-05-01 PROCEDURE — 99900035 HC TECH TIME PER 15 MIN (STAT)

## 2025-05-01 PROCEDURE — 3051F HG A1C>EQUAL 7.0%<8.0%: CPT | Mod: CPTII,S$GLB,, | Performed by: PHYSICIAN ASSISTANT

## 2025-05-01 PROCEDURE — 1160F RVW MEDS BY RX/DR IN RCRD: CPT | Mod: CPTII,S$GLB,, | Performed by: PHYSICIAN ASSISTANT

## 2025-05-01 PROCEDURE — 97116 GAIT TRAINING THERAPY: CPT

## 2025-05-01 PROCEDURE — 63600175 PHARM REV CODE 636 W HCPCS: Performed by: STUDENT IN AN ORGANIZED HEALTH CARE EDUCATION/TRAINING PROGRAM

## 2025-05-01 PROCEDURE — 25000003 PHARM REV CODE 250: Performed by: STUDENT IN AN ORGANIZED HEALTH CARE EDUCATION/TRAINING PROGRAM

## 2025-05-01 PROCEDURE — 25000003 PHARM REV CODE 250: Performed by: PHYSICIAN ASSISTANT

## 2025-05-01 PROCEDURE — 99024 POSTOP FOLLOW-UP VISIT: CPT | Mod: S$GLB,,, | Performed by: PHYSICIAN ASSISTANT

## 2025-05-01 PROCEDURE — 99999 PR PBB SHADOW E&M-EST. PATIENT-LVL IV: CPT | Mod: PBBFAC,,, | Performed by: PHYSICIAN ASSISTANT

## 2025-05-01 PROCEDURE — 1159F MED LIST DOCD IN RCRD: CPT | Mod: CPTII,S$GLB,, | Performed by: PHYSICIAN ASSISTANT

## 2025-05-01 PROCEDURE — 97110 THERAPEUTIC EXERCISES: CPT

## 2025-05-01 RX ORDER — CELECOXIB 200 MG/1
200 CAPSULE ORAL DAILY
Status: DISCONTINUED | OUTPATIENT
Start: 2025-05-01 | End: 2025-05-01 | Stop reason: HOSPADM

## 2025-05-01 RX ADMIN — FAMOTIDINE 20 MG: 20 TABLET, FILM COATED ORAL at 09:05

## 2025-05-01 RX ADMIN — CEFAZOLIN 2 G: 2 INJECTION, POWDER, FOR SOLUTION INTRAMUSCULAR; INTRAVENOUS at 03:05

## 2025-05-01 RX ADMIN — OXYCODONE 5 MG: 5 TABLET ORAL at 04:05

## 2025-05-01 RX ADMIN — ACETAMINOPHEN 1000 MG: 500 TABLET ORAL at 06:05

## 2025-05-01 RX ADMIN — GLIMEPIRIDE 2 MG: 1 TABLET ORAL at 09:05

## 2025-05-01 RX ADMIN — SENNOSIDES AND DOCUSATE SODIUM 1 TABLET: 50; 8.6 TABLET ORAL at 09:05

## 2025-05-01 RX ADMIN — LEVOTHYROXINE SODIUM 50 MCG: 50 TABLET ORAL at 06:05

## 2025-05-01 RX ADMIN — METHOCARBAMOL 750 MG: 750 TABLET ORAL at 09:05

## 2025-05-01 RX ADMIN — MUPIROCIN 1 G: 20 OINTMENT TOPICAL at 09:05

## 2025-05-01 RX ADMIN — INSULIN ASPART 2 UNITS: 100 INJECTION, SOLUTION INTRAVENOUS; SUBCUTANEOUS at 06:05

## 2025-05-01 RX ADMIN — ASPIRIN 81 MG: 81 TABLET, COATED ORAL at 09:05

## 2025-05-01 RX ADMIN — METFORMIN HYDROCHLORIDE 1000 MG: 500 TABLET ORAL at 09:05

## 2025-05-01 RX ADMIN — OXYCODONE 5 MG: 5 TABLET ORAL at 09:05

## 2025-05-01 RX ADMIN — ATORVASTATIN CALCIUM 10 MG: 10 TABLET, FILM COATED ORAL at 09:05

## 2025-05-01 RX ADMIN — POLYETHYLENE GLYCOL 3350 17 G: 17 POWDER, FOR SOLUTION ORAL at 09:05

## 2025-05-01 RX ADMIN — CELECOXIB 200 MG: 200 CAPSULE ORAL at 09:05

## 2025-05-01 NOTE — DISCHARGE SUMMARY
"[facility]  Discharge Note  Short Stay    Procedure(s) (LRB):  ARTHROPLASTY, KNEE, TOTAL (Right)      OUTCOME: Patient tolerated treatment/procedure well without complication and is now ready for discharge.    DISPOSITION: Home or Self Care    FINAL DIAGNOSIS: Right knee osteoarthritis    FOLLOWUP: In clinic    DISCHARGE INSTRUCTIONS:    Discharge Procedure Orders   WALKER FOR HOME USE     Order Specific Question Answer Comments   Type of Walker: Adult (5'4"-6'6")    With wheels? No    Height: 5' 6" (1.676 m)    Weight: 64.4 kg (142 lb)    Length of need (1-99 months): 99    Please check all that apply: Walker will be used for gait training.      CBC Auto Differential   Standing Status: Future Number of Occurrences: 1 Standing Exp. Date: 06/08/26     Comprehensive Metabolic Panel   Standing Status: Future Number of Occurrences: 1 Standing Exp. Date: 06/08/26     TSH   Standing Status: Future Number of Occurrences: 1 Standing Exp. Date: 06/08/26     Protime-INR   Standing Status: Future Number of Occurrences: 1 Standing Exp. Date: 06/08/26     Activity as tolerated     Sponge bath only until clinic visit     Keep surgical extremity elevated     Lifting restrictions   Order Comments: No strenuous exercise or lifting of > 10 lbs     Weight bearing as tolerated     No driving, operating heavy equipment or signing legal documents while taking pain medication     Leave dressing on - Keep it clean, dry, and intact until clinic visit   Order Comments: Do not remove surgical dressing for 2 weeks post-op. This will be done only by MD at initial post-op visit. If dressing is completely saturated, replace with identical dressing - silver-impregnated hydrocolloid dressing.     Do not get dressings wet. Do not shower.     If dressing continues to be saturated or there are signs of infection, please call Ortho Clinic [phone] for further instructions and to make appt to be seen.     Call MD for:  temperature " >100.4     Call MD for:  persistent nausea and vomiting     Call MD for:  severe uncontrolled pain     Call MD for:  difficulty breathing, headache or visual disturbances     Call MD for:  redness, tenderness, or signs of infection (pain, swelling, redness, odor or green/yellow discharge around incision site)     Call MD for:  hives     Call MD for:  persistent dizziness or light-headedness     Call MD for:  extreme fatigue     EKG 12-lead   Standing Status: Future Number of Occurrences: 1 Standing Exp. Date: 04/09/26        TIME SPENT ON DISCHARGE: 15 minutes

## 2025-05-01 NOTE — PLAN OF CARE
Plan of care reviewed with patient; verbalized understanding. No further concerns as of present.  Medications reviewed and administered as ordered.Throughout shift, pt remained oriented x 4, calm, respirations even and unlabored w/ no SOB.  Safety precautions maintained throughout shift, rounding for safety and patient care per policy, remained free of falls/injury. Pt voiding w/o difficulty. Pain assessment completed Q2H, pain managed w/meds as ordered. Patient working appropriately with PT/OT.  DME at bedside.  Call light within reach, bed wheels locked, bed in lowest position, side rails up x2, safety maintained. NADN, Will continue provide education and support.

## 2025-05-01 NOTE — PLAN OF CARE
Problem: Physical Therapy  Goal: Physical Therapy Goal  Description: Goals to be met by: 25     Patient will increase functional independence with mobility by performin. Supine to sit with supervision - Met  2. Sit to stand transfer with Supervision-  Met  3. Gait x100 feet with Supervision using Rolling Walker- Met  4. Ascend/Descend 1 curb step with Stand by assistance using Rolling Walker- Met  5. Lower extremity exercise program x10 reps per handout, with supervision - Met     Outcome: Progressing

## 2025-05-01 NOTE — PT/OT/SLP PROGRESS
"Occupational Therapy   Treatment and Discharge Note    Name: Sagrario Freire  MRN: [id number]  Admitting Diagnosis:  <principal problem not specified>  1 Day Post-Op    Recommendations:     Discharge Recommendations: Low Intensity Therapy  Discharge Equipment Recommendations:  none  Barriers to discharge:  None    Assessment:     Sagrario Freire is a 74 y.o. female with a medical diagnosis of <principal problem not specified>.  She presents with the following performance deficits affecting function: gait instability, impaired functional mobility, impaired self care skills. Pt was willing to participate, tolerated session well overall. She was able to ambulate to bathroom to perform toilet t/f and hygiene tasks at sink before performing dressing tasks in chair to finish session. She demonstrated good activity tolerance and good safety awareness c/ RW. She is currently functioning at appropriate independence levels with adequate support at home to be discharged from acute OT services at this time.    Pt will have support from spouse upon d/c.    Plan:     Patient to be discharged from acute OT services at this time.    Subjective     Chief Complaint: none stated  Patient/Family Comments/goals: "I just used the bathroom actually."  Pain/Comfort:  Pain Rating 1: 0/10 (at rest)  Pain Rating Post-Intervention 1:  (not rated)    Objective:     Communicated with: RN prior to session.  Patient found supine with perineural catheter, cryotherapy, FCD upon OT entry to room.    General Precautions: Standard, fall    Orthopedic Precautions:RLE weight bearing as tolerated  Braces: N/A  Respiratory Status: Room air     Occupational Performance:     Bed Mobility:    Patient completed Rolling/Turning to Left with  supervision  Patient completed Scooting/Bridging with supervision  Patient completed Supine to Sit with supervision     Functional Mobility/Transfers:  Patient completed Sit <> Stand Transfer with supervision  with  " rolling walker   Patient completed Toilet Transfer Step Transfer technique with supervision with  rolling walker  Chair t/f: SPV c/ RW  Functional Mobility: from bed to bathroom to chair; SPV c/ RW; no LOB    Activities of Daily Living:  Grooming: supervision /setup; pt washed face, brushed teeth, brushed hair standing at sink  Upper Body Dressing: setup/SPV; pt donned shirt seated in chair    Lower Body Dressing: setup/SPV; pt donned underwear, pants seated in chair    Toileting: pt not needing to void at this time        [facility] 6 Click ADL: 23    Treatment & Education:  Pt edu on role of OT, POC, safety when performing self care tasks , benefit of performing OOB activity, and safety when performing functional transfers and functional mobility.  - White board updated  - Self care tasks completed-- as noted above    - pt educated on proper sequencing for LB dressing  - pt educated on proper sequencing for getting into/out of vehicle  - pt educated on safe t/f's c/ RW to various surfaces to prep for ADLs    Patient left up in chair with all lines intact, call button in reach, RN notified, and spouse present    GOALS:   Multidisciplinary Problems       Occupational Therapy Goals          Problem: Occupational Therapy    Goal Priority Disciplines Outcome Interventions   Occupational Therapy Goal     OT, PT/OT Progressing    Description: Goals to be met by: 5/1/25     Patient will increase functional independence with ADLs by performing:    UE Dressing with Modified Reeves.  LE Dressing with Modified Reeves and Assistive Devices as needed.  Grooming while standing at sink with Modified Reeves.  Toileting from bedside commode with Modified Reeves for hygiene and clothing management.   Bathing from  shower chair/bench with Modified Reeves.  Toilet transfer to bedside commode with Modified Reeves.                       Time Tracking:     OT Date of Treatment: 05/01/25  OT Start Time:  1005  OT Stop Time: 1023  OT Total Time (min): 18 min    Billable Minutes:Self Care/Home Management 18    OT/MARY: OT          5/1/2025

## 2025-05-01 NOTE — PROGRESS NOTES
I called the patient today regarding her surgery with Dr. Hector. The patient had a right TKA on 4/30/2025.    Pain Scale: 6 / 10    Any issues with Fever: No.    Any issues with medications (specifically DVT prophylaxis): No.    Any issues with bowel movements:  Passing radha: No.                                                                 Urination: No.                                                                 Constipation: No.    Completing at home exercises: Yes:     Any concerns regarding their dressing/bandage:  No.    Patient confirmed first OP-PT appointment:  Abdullahi on  5/2/2025 at 11:00.    Any other concerns: No.    The patient was informed that if they have any urgent issues with their bandage, medications or any other health concerns regarding their surgery to call the 24/7 Orthopedic Post-op Hot Line at [phone]. The patient was reminded that if they have any chest pain or shortness of breath to call 911 or go to the ER.    The patient verbalized understanding and does not have any other questions

## 2025-05-01 NOTE — PROGRESS NOTES
Acute Pain Service and Perioperative Surgical Home Progress Note    HPI  Sagrario Freire is a 74 y.o., female, status post arthroplasty of the right knee with no acute events overnight.  Patient tolerating p.o. and voiding well, pain well controlled overnight.  Perineural catheter infusing well, cavernous site is clean and dry.  Hyperglycemic after dinner, patient consumed her 's cheese cake.  She was treated with IV fluids subQ and IV insulin along with her regular regimen including Lantus glimepiride and metformin.  Pedialyte was removed, and blood sugars improved.    Interval history      Surgery:  Procedure(s) (LRB):  ARTHROPLASTY, KNEE, TOTAL (Right)    Post Op Day #: 1    Catheter type: Perineural Adductor Canal    Infusion type: Ropivacaine 0.2%, with a 7cc automatic bolus every 3 hours, combined with a 5 cc patient controlled bolus available [id number]    Problem List:  There are no hospital problems to display for this patient.      Subjective:       General appearance of alert, oriented, no complaints   Pain with rest: 2    Numbers   Pain with movement: 3    Numbers   Side Effects    1. Pruritis No    2. Nausea No    3. Motor Blockade Yes, 0=Ability to raise lower extremities off bed    4. Sedation No, 1=awake and alert    Schedule Medications:    acetaminophen  1,000 mg Oral Q6H    aspirin  81 mg Oral BID    atorvastatin  10 mg Oral Daily    celecoxib  200 mg Oral Daily    electrolytes-dextrose  400 mL Oral Q4H    famotidine  20 mg Oral BID    glimepiride  2 mg Oral Q12H    insulin glargine U-100  15 Units Subcutaneous QHS    levothyroxine  50 mcg Oral Before breakfast    metFORMIN  1,000 mg Oral BID WM    methocarbamoL  750 mg Oral TID    mupirocin  1 g Nasal BID    polyethylene glycol  17 g Oral Daily    pregabalin  75 mg Oral QHS    senna-docusate  1 tablet Oral BID        Continuous Infusions:   ropivacaine 0.2% Perineural Pump infusion 500 ML   Perineural Continuous   New Bag at 04/30/25 1340  "       PRN Medications:    Current Facility-Administered Medications:     bisacodyL, 10 mg, Rectal, Q12H PRN    dextrose 50%, 12.5 g, Intravenous, PRN    dextrose 50%, 25 g, Intravenous, PRN    glucagon (human recombinant), 1 mg, Intramuscular, PRN    glucose, 16 g, Oral, PRN    glucose, 24 g, Oral, PRN    insulin aspart U-100, 0-10 Units, Subcutaneous, QID (AC + HS) PRN    morphine, 2 mg, Intravenous, Q3H PRN    naloxone, 0.02 mg, Intravenous, PRN    ondansetron, 4 mg, Intravenous, Q8H PRN    oxyCODONE, 5 mg, Oral, Q3H PRN    oxyCODONE, 10 mg, Oral, Q3H PRN    prochlorperazine, 5 mg, Intravenous, Q6H PRN       Antibiotics:  Antibiotics (From admission, onward)      Start     Stop Route Frequency Ordered    04/30/25 2100  mupirocin 2 % ointment 1 g         05/05/25 2059 Nasl 2 times daily 04/30/25 1635               Objective:     Catheter site clean, dry, intact          Vital Signs (Most Recent):  Temp: 97.1 °F (36.2 °C) (05/01/25 0400)  Pulse: 66 (05/01/25 0403)  Resp: 16 (05/01/25 0400)  BP: 108/60 (05/01/25 0405)  SpO2: 96 % (05/01/25 0400) Vital Signs Range (Last 24H):  Temp:  [97 °F (36.1 °C)-98.1 °F (36.7 °C)]   Pulse:  [64-79]   Resp:  [10-18]   BP: ()/(56-76)   SpO2:  [94 %-100 %]          I & O (Last 24H):  Intake/Output Summary (Last 24 hours) at 5/1/2025 0531  Last data filed at 5/1/2025 0436  Gross per 24 hour   Intake 3650.38 ml   Output 1325 ml   Net 2325.38 ml       Physical Exam:    GA: Alert, comfortable, no acute distress.   Pulmonary: Clear to auscultation. Normal RR.    Cardiac: regular rate and rhythm.      Laboratory: reviewed in chart  CBC: No results for input(s): "WBC", "RBC", "HGB", "HCT", "PLT", "MCV", "MCH", "MCHC" in the last 72 hours.    BMP: No results for input(s): "NA", "K", "CO2", "CL", "BUN", "CREATININE", "GLU", "MG", "PHOS", "CALCIUM" in the last 72 hours.    No results for input(s): "PT", "INR", "PROTIME", "APTT" in the last 72 hours.          Assessment:         Pain " control adequate    Plan:     1) Pain: Adductor canal perineural catheter in place and infusing. Dressing in tact.  Multimodal pain regimen ordered which includes acetaminophen, celecoxib, pregabalin, and prn oxycodone.  Will continue to monitor. Plan to discharge with Perineural Pain Pump.   2) diabetes, continue oral regimen and Lantus at night, sliding scale with meals   3) anxiety, p.r.n. Xanax   4) hypothyroidism, continue home regimen   5) FEN/GI: Tolerating regular diet.     6) Dispo: Pt working well with PT/OT. Case management and SW following along for setting up home health and physical therapy. Plan to discharge home this am.          Evaluator Philip Win PA-C

## 2025-05-01 NOTE — NURSING
Has met unit/department guidelines for discharge from each phase of the post procedure continuum. Patient discharged.  Instructions, placed in dc folder and Prescriptions given.  IV removed, tolerated well, w/ catheter intact, no redness or swelling to area. . Dressing to (R) knee remains CDI. Patient verbalized understanding instructions.  AAOx3, VSS, NADN, no complaints of pain noted at this time.  Wheelchair to private vehicle in care of spouse.  All personal belongings sent with pt.  Blue Bracelet given applied to pts wrist and instructions given to call # on bracelet w/any surgery related issues.

## 2025-05-01 NOTE — PLAN OF CARE
Plan of care reviewed with patient; verbalized understanding.   Medications reviewed and administered as ordered. Safety precautions maintained throughout shift, rounding for safety and patient care per policy, remained free of falls/injury.    Pain managed with medications as seen on MAR. Call light within reach, bed wheels locked, bed in lowest position, side rails up x2, safety maintained. NADN or further needs voiced.     Problem: Adult Inpatient Plan of Care  Goal: Optimal Comfort and Wellbeing  Outcome: Progressing  Intervention: Monitor Pain and Promote Comfort  Flowsheets (Taken 5/1/2025 0626)  Pain Management Interventions:   care clustered   cold applied   medication offered   pain pump in use   pillow support provided   position adjusted   pain management plan reviewed with patient/caregiver  Intervention: Provide Person-Centered Care  Flowsheets (Taken 5/1/2025 0626)  Trust Relationship/Rapport:   care explained   choices provided   questions answered   questions encouraged   reassurance provided     Problem: Diabetes Comorbidity  Goal: Blood Glucose Level Within Targeted Range  Outcome: Progressing  Intervention: Monitor and Manage Glycemia  Flowsheets (Taken 5/1/2025 0626)  Glycemic Management:   blood glucose monitored   oral hydration promoted   supplemental insulin given     Problem: Pain Acute  Goal: Optimal Pain Control and Function  Outcome: Progressing  Intervention: Develop Pain Management Plan  Flowsheets (Taken 5/1/2025 0626)  Pain Management Interventions:   care clustered   cold applied   medication offered   pain pump in use   pillow support provided   position adjusted   pain management plan reviewed with patient/caregiver

## 2025-05-01 NOTE — PT/OT/SLP PROGRESS
"Physical Therapy Treatment and Discharge Summary    Patient Name:  Sagrario Freire   MRN:  [id number]    Recommendations:     Discharge Recommendations: Low Intensity Therapy  Discharge Equipment Recommendations: bedside commode, shower chair, walker, rolling  Barriers to discharge: None    Assessment:     Sagrario Freire is a 74 y.o. female admitted with a medical diagnosis of <principal problem not specified>.  She presents with the following impairments/functional limitations: weakness, impaired endurance, impaired self care skills, impaired functional mobility, gait instability, decreased lower extremity function, pain, decreased ROM, edema Pt presents s/p R TKA with expected post-op deficits.  Pt progressed really well with PT today and was able to amb 170' with RW and SBA initially but then progressed to Supervision .    She ascended/descended 6" curb step with RW and SBA. She had no LOB and no dizziness. Pt and her  appear to have good understanding of all ed provided during today's session.  Pt is ready for d/c home today from PT standpoint with her   to assist PRN and  will benefit from OPPT for cont PT to maximize  functional recovery, strength and ROM.  .    Rehab Prognosis: Good; patient would benefit from cont  skilled PT services post d/c  to address these deficits and reach maximum level of function.    Recent Surgery: Procedure(s) (LRB):  ARTHROPLASTY, KNEE, TOTAL (Right) 1 Day Post-Op    Plan:     During this hospitalization, patient to be d/c home today after PT with her  to assist PRN and OPPT to address the identified rehab impairments via gait training, therapeutic activities, therapeutic exercises and progress toward the following goals:    Plan of Care Expires:  05/02/25    Subjective     Chief Complaint: " I have a little pain"  Patient/Family Comments/goals: to go home  Pain/Comfort:  Pain Rating 1: 4/10  Location - Side 1: Right  Location - Orientation 1: " "generalized  Location 1: knee  Pain Addressed 1: Pre-medicate for activity, Reposition, Distraction  Pain Rating Post-Intervention 1: 4/10      Objective:     Communicated with RN prior to session.  Patient found up in chair with peripheral IV, perineural catheter, FCD, SCD upon PT entry to room. Pt's  present in room and for entire PT session    General Precautions: Standard, fall  Orthopedic Precautions: RLE weight bearing as tolerated  Braces: N/A  Respiratory Status: Room air     Functional Mobility:  Bed Mobility:     Supine to Sit: supervision  Sit to Supine: supervision  Transfers:     Sit to Stand:  supervision with rolling walker  Gait: Pt amb 170' x 1 with SBA initially and then able to progress to Supervision, and also amb 20' x1 with RW and Supervision.    She required cues for posture, heel strike and sequencing to increase safety and indep with gait.   She amb with step-through gait pattern  Gait Deviations:   steady gait, decreased step length, decreased weight shift, decreased foot clearance, decreased jacki, and increased time in stance phase on unaffected leg      Balance: sitting: modified indep  standing with RW: Supervision to SBA  Stairs:  Pt ascended/descended 6" curb step with Rolling Walker with no handrails with Stand-by Assistance.       AM-PAC 6 CLICK MOBILITY  Turning over in bed (including adjusting bedclothes, sheets and blankets)?: 4  Sitting down on and standing up from a chair with arms (e.g., wheelchair, bedside commode, etc.): 3  Moving from lying on back to sitting on the side of the bed?: 4  Moving to and from a bed to a chair (including a wheelchair)?: 3  Need to walk in hospital room?: 3  Climbing 3-5 steps with a railing?: 3  Basic Mobility Total Score: 20       Treatment & Education:  Patient educated on role of acute care PT and PT POC, safety while in hospital including calling nurse for mobility, and call light usage.  Educated about weightbearing as issa and " provided cuing for adherence as appropriate during session.  Educated about importance of OOB mobility and remaining up in chair most of the day.  PT instructed pt on and performed therapeutic ex's for TKA HEP (QS, AP, GS, HS, Hip abd, SLR, SAQ, LAQ) x 10 reps x 3 sets each for muscle strengthening, endurance and increase knee ROM. Pt demonstrated good understanding back to PT. Patient required skilled PT for instruction of exercises and appropriate cues to perform exercises safely and appropriately.  Issued written handout of TKA HEP and reviewed with pt.  PT ed pt on importance of elevating  LE above level of her heart 3-4 times a day and proper placement of pillows to keep knee extended and not flexed.  Pt and her caregiver verbalized good understanding back to PT.    PT ed pt on mobility expectations at home after d/c and she verbalized good understanding back to PT  PT ed pt on use of cryotherapy for edema and pain control, and on safety awareness with use of RW in the home and pt verbalized good understanding back to PT.   PT reviewed and demonstrated car transfers with pt and caregiver and answered all questions.  Pt and caregiver verbalized good understanding back to PT.       Patient left up in chair with all lines intact, call button in reach, RN notified, and  present..    GOALS:   Multidisciplinary Problems       Physical Therapy Goals          Problem: Physical Therapy    Goal Priority Disciplines Outcome Interventions   Physical Therapy Goal     PT, PT/OT Progressing    Description: Goals to be met by: 25     Patient will increase functional independence with mobility by performin. Supine to sit with supervision - Met  2. Sit to stand transfer with Supervision-  Met  3. Gait x100 feet with Supervision using Rolling Walker- Met  4. Ascend/Descend 1 curb step with Stand by assistance using Rolling Walker- Met  5. Lower extremity exercise program x10 reps per handout, with supervision -  Met                          DME Justifications:   Sagrario's mobility limitation cannot be sufficiently resolved by the use of a cane. Her functional mobility deficit can be sufficiently resolved with the use of a Rolling Walker. Patient's mobility limitation significantly impairs their ability to participate in one of more activities of daily living.  The use of a RW will significantly improve the patient's ability to participate in MRADLS and the patient will use it on regular basis in the home.    Time Tracking:     PT Received On: 05/01/25  PT Start Time: 1109     PT Stop Time: 1142  PT Total Time (min): 33 min     Billable Minutes: Gait Training 17 and Therapeutic Exercise 16    Treatment Type: Treatment  PT/PTA: PT     Number of PTA visits since last PT visit: 0     05/01/2025

## 2025-05-01 NOTE — ANESTHESIA POSTPROCEDURE EVALUATION
Anesthesia Post Evaluation    Patient: Sagrario Freire    Procedure(s) Performed: Procedure(s) (LRB):  ARTHROPLASTY, KNEE, TOTAL (Right)    Final Anesthesia Type: spinal      Patient location during evaluation: PACU  Patient participation: Yes- Able to Participate  Level of consciousness: awake and alert and oriented  Post-procedure vital signs: reviewed and stable  Pain management: adequate  Airway patency: patent    PONV status at discharge: No PONV  Anesthetic complications: no      Cardiovascular status: blood pressure returned to baseline and hemodynamically stable  Respiratory status: unassisted, room air and spontaneous ventilation  Hydration status: euvolemic  Follow-up not needed.              Vitals Value Taken Time   /60 05/01/25 04:05   Temp 36.2 °C (97.1 °F) 05/01/25 04:00   Pulse 86 05/01/25 07:08   Resp 16 05/01/25 07:08   SpO2 95 % 05/01/25 07:08         Event Time   Out of Recovery 14:35:50         Pain/Eugenio Score: Pain Rating Prior to Med Admin: 2 (5/1/2025  6:07 AM)  Pain Rating Post Med Admin: 2 (5/1/2025  5:06 AM)  Eugenio Score: 9 (4/30/2025  2:15 PM)

## 2025-05-02 ENCOUNTER — CLINICAL SUPPORT (OUTPATIENT)
Dept: REHABILITATION | Facility: HOSPITAL | Age: 75
End: 2025-05-02
Payer: MEDICARE

## 2025-05-02 DIAGNOSIS — M25.661 DECREASED RANGE OF MOTION (ROM) OF RIGHT KNEE: Primary | ICD-10-CM

## 2025-05-02 DIAGNOSIS — M17.11 PRIMARY OSTEOARTHRITIS OF RIGHT KNEE: ICD-10-CM

## 2025-05-02 PROCEDURE — 97161 PT EVAL LOW COMPLEX 20 MIN: CPT

## 2025-05-02 PROCEDURE — 97112 NEUROMUSCULAR REEDUCATION: CPT

## 2025-05-02 PROCEDURE — 97140 MANUAL THERAPY 1/> REGIONS: CPT

## 2025-05-02 RX ORDER — ROPIVACAINE HYDROCHLORIDE 5 MG/ML
INJECTION, SOLUTION EPIDURAL; INFILTRATION; PERINEURAL
Status: DISCONTINUED | OUTPATIENT
Start: 2025-04-30 | End: 2025-05-02

## 2025-05-02 NOTE — ANESTHESIA PROCEDURE NOTES
Adductor Canal Continuous Nerve Catheter    Patient location during procedure: pre-op   Block not for primary anesthetic.  Reason for block: at surgeon's request and post-op pain management   Post-op Pain Location: R knee pain   Start time: 4/30/2025 9:20 AM  Timeout: 4/30/2025 9:19 AM   End time: 4/30/2025 9:22 AM    Staffing  Authorizing Provider: Pranav Sifuentes MD  Performing Provider: Pranav Sifuentes MD    Staffing  Performed by: Pranav Sifuentes MD  Authorized by: Pranav Sifuentes MD    Preanesthetic Checklist  Completed: patient identified, IV checked, site marked, risks and benefits discussed, surgical consent, monitors and equipment checked, pre-op evaluation and timeout performed  Peripheral Block  Patient position: supine  Prep: ChloraPrep and site prepped and draped  Patient monitoring: heart rate, cardiac monitor, continuous pulse ox, continuous capnometry and frequent blood pressure checks  Block type: adductor canal  Laterality: right  Injection technique: continuous  Needle  Needle type: Tuohy   Needle gauge: 17 G  Needle length: 3.5 in  Needle localization: anatomical landmarks and ultrasound guidance  Catheter type: spring wound  Catheter size: 19 G  Test dose: lidocaine 1.5% with Epi 1-to-200,000 and negative   -ultrasound image captured on disc.  Assessment  Injection assessment: negative aspiration, negative parasthesia and local visualized surrounding nerve  Paresthesia pain: none  Heart rate change: no  Slow fractionated injection: yes  Pain Tolerance: comfortable throughout block and no complaints  Medications:    Medications: ropivacaine (NAROPIN) injection 0.5% - Perineural   7.5 mL - 4/30/2025 9:22:00 AM    Additional Notes  VSS.  [facility] RN monitoring vitals throughout procedure.  Patient tolerated procedure well.

## 2025-05-02 NOTE — PROGRESS NOTES
Outpatient Rehab    Physical Therapy Evaluation    Patient Name: Sagrario Freire  MRN: [id number]  YOB: 1950  Encounter Date: 5/2/2025    Therapy Diagnosis:   Encounter Diagnoses   Name Primary?    Primary osteoarthritis of right knee     Decreased range of motion (ROM) of right knee Yes     Physician: Hans Brice*    Physician Orders: Eval and Treat  Medical Diagnosis: Primary osteoarthritis of right knee    Visit # / Visits Authorized:  1 / 1  Insurance Authorization Period: 4/21/2025 to 4/21/2026  Date of Evaluation: 5/2/2025  Plan of Care Certification: 5/2/2025 to 6/27/2025     Time In: 1100   Time Out: 1151  Total Time: 51   Total Billable Time: 51    Intake Outcome Measure for FOTO Survey    Therapist reviewed FOTO scores for Sagrario Freire on 5/2/2025.   FOTO report - see Media section or FOTO account episode details.     Intake Score:  %         Subjective   History of Present Illness  Sagrario is a 75 y.o. female who reports to physical therapy with a chief concern of right knee pain.     The patient reports a medical diagnosis of Primary osteoarthritis of right knee (M17.11).    Diagnostic tests related to this condition: X-ray.        History of Present Condition/Illness: Patient reports to the clinic 2 days status post right total knee arthroplasty by Dr. Hector. Patient reports history of L total knee arthroplasty about 4 years ago by Dr. Hector as well. She reports that she is in moderate pain, wearing compression stockings, and pain pump in place. Patient here with spouse.     Activities of Daily Living  Social history was obtained from Patient.       General Current Level of Function Comments: Mod I       Previously independent with activities of daily living? Yes                Previously independent with instrumental activities of daily living? Yes                    Pain     Patient reports a current pain level of 5/10. Pain at best is reported as 3/10. Pain at  worst is reported as 8/10.   Location: right knee  Clinical Progression (since onset): Stable  Pain Qualities: Aching, Burning  Pain-Relieving Factors: Rest, Ice, Medications - prescription, Elevation  Pain-Aggravating Factors: Bending, Movement, Kneeling, Stair climbing, Squatting, Straightening, Walking, Sleeping, Transfers         Treatment History  Treatments  Discharged From Past 30 Days: Inpatient acute facility    Living Arrangements  Living Situation  Housing: Home independently  Living Arrangements: Spouse/significant other    Home Setup  Type of Structure: House        Employment  Employment Status: Retired          Past Medical History/Physical Systems Review:   Sagrario Freire  has a past medical history of Abnormal ECG, Diabetes mellitus, type 2, GERD (gastroesophageal reflux disease), and Hypothyroidism.    Sagrario Freire  has a past surgical history that includes Colectomy; Belt abdominoplasty; Abdominal hernia repair; hand operation (Right); Total knee arthroplasty (Left, 4/14/2021); and Total knee arthroplasty (Right, 4/30/2025).    Sagrario has a current medication list which includes the following prescription(s): acetaminophen, alprazolam, apixaban, atorvastatin, celecoxib, glimepiride, ibuprofen, insulin glargine u-100 (lantus), insulin lispro, levothyroxine, metformin, metformin, methocarbamol, ondansetron, oxycodone, pantoprazole, pregabalin, pregabalin, mounjaro, and zolpidem, and the following Facility-Administered Medications: fentanyl, midazolam, and ropivacaine hcl/pf.    Review of patient's allergies indicates:   Allergen Reactions    Iodinated contrast media Other (See Comments)     Hypotension, hypoxia        Objective          Observation: Patient presents to the clinic using front wheeled walker. Post-op dressing in place without concern.    Gait: Ambulating with front wheeled walker with decreased step and stride length, knee flexion during stance phase, and hip circumduction  during swing phase. Education given on proper heel-toe gait pattern and use of front wheeled walker.      Range of Motion:   Knee Right Active Left Active   Flexion Pre: 45 degrees  Post: 80 degrees AAROM 120 degrees   Extension +2 hyperextension 0 degrees     Lower Extremity Strength:  Formal MMT not performed secondary to POD#2 and increased pain. Poor quad activation noted in right lower extremity and unable to perform straight leg raise without extensor lag.     Endurance Assessment:    Evaluation   Timed Up and Go NT due to post op status   30 sec STS NT due to post op status        Functional tests:               DL Squat: Not performed 2/2 post-op.              Step-down: Not performed 2/2 post-op.              SLS EO: Not performed 2/2 post-op.              SLS EC: Not performed 2/2 post-op.    Joint Mobility: hypomobile as expected post-op.     Palpation: Tenderness as expected post-op     Sensation: Intact; diminished secondary to residual nerve block.     Flexibility: Grossly hypomobile quad, hamstring and gastroc as expected on post-op right lower extremity      Edema: As expected post-op    Treatment:  Manual Therapy  MT 1: Grade I-III patella and fat pad mobs  Balance/Neuromuscular Re-Education  NMR 1: Education on POC, HEP, TKA protocol, transfer safety, TJL number, fww ambulation, gait mechanics, post op recovery with swelling, DVT risks and s/s  NMR 2: Heel slides 5'  NMR 3: Heel prop 5'  NMR 4: Quad sets 10 sec hold x 30 reps    Time Entry(in minutes):  PT Evaluation (Low) Time Entry: 20  Manual Therapy Time Entry: 8  Neuromuscular Re-Education Time Entry: 23    Assessment & Plan   Assessment  Sagrario presents with a condition of Low complexity.   Presentation of Symptoms: Stable  Will Comorbidities Impact Care: No       Functional Limitations: Activity tolerance, Completing self-care activities, Proprioception, Range of motion, Participating in leisure activities, Pain with ADLs/IADLs, Gross motor  coordination, Painful locomotion/ambulation, Squatting, Standing tolerance, Transfers, Decreased ambulation distance/endurance  Impairments: Pain with functional activity, Impaired physical strength  Personal Factors Affecting Prognosis: Pain    Patient Goal for Therapy (PT): get ready for cruise in 4 months  Prognosis: Good  Assessment Details: Patient is a 75 year old female who presents status post right total knee arthroplasty. Patient demonstrates deficits with range of motion, strength, and function that limit ability to participate in daily and recreational activities. They would benefit from skilled PT services to normalize kinetic chain mobility, strength, and function to safely return to their prior level of activity.    Plan  From a physical therapy perspective, the patient would benefit from: Skilled Rehab Services    Planned therapy interventions include: Therapeutic exercise, Therapeutic activities, Neuromuscular re-education, Manual therapy, ADLs/IADLs, and Gait training.    Planned modalities to include: Biofeedback, Electrical stimulation - attended, Electrical stimulation - passive/unattended, Thermotherapy (hot pack), and Cryotherapy (cold pack).        Visit Frequency: 3 times Per Week for 8 Weeks.       This plan was discussed with Patient.   Discussion participants: Agreed Upon Plan of Care  Plan details: Frequency and duration of treatment to be adjusted as needed          Patient's spiritual, cultural, and educational needs considered and patient agreeable to plan of care and goals.     Education  Education was done with Patient. The patient's learning style includes Demonstration, Listening, and Pictures/video. The patient Verbalizes understanding and Demonstrates understanding.                 Goals:   Active       LONG TERM GOALS       Patient will be able to complete the TUG in </= 10 seconds with the least restrictive assistive device to decrease fall risk.        Start:  05/02/25     Expected End:  06/27/25             Patient will demonstrate >/= 4/5 lower quarter strength to facilitate transfers from sit to stand from various surfaces without restriction.       Start:  05/02/25    Expected End:  06/27/25            Patient will improve 30 second sit to stand to at least 10x for improvement with transfer tasks.         Start:  05/02/25    Expected End:  06/27/25               SHORT TERM GOALS       Patient will reduce maximal pain rating to < 3/10 pain to facilitate ability to sleep through the night and recover from PT interventions.       Start:  05/02/25    Expected End:  05/30/25            Patient will be able to ambulate for at least 10 minutes with < 3/10 pain to improve walking tolerance.        Start:  05/02/25    Expected End:  05/30/25            Patient will improve knee range of motion to at least 0-110 degrees for improved functional performance.        Start:  05/02/25    Expected End:  05/30/25                Alina Armstrong, PT

## 2025-05-02 NOTE — ADDENDUM NOTE
Addendum  created 05/02/25 0909 by Pranav Sifuentes MD    Child order released for a procedure order, Clinical Note Signed, Intraprocedure Blocks edited, LDA created via procedure documentation, SmartForm saved

## 2025-05-05 ENCOUNTER — CLINICAL SUPPORT (OUTPATIENT)
Dept: REHABILITATION | Facility: HOSPITAL | Age: 75
End: 2025-05-05
Attending: UROLOGY
Payer: MEDICARE

## 2025-05-05 DIAGNOSIS — M25.661 DECREASED RANGE OF MOTION (ROM) OF RIGHT KNEE: Primary | ICD-10-CM

## 2025-05-05 PROCEDURE — 97014 ELECTRIC STIMULATION THERAPY: CPT | Mod: CQ

## 2025-05-05 PROCEDURE — 97112 NEUROMUSCULAR REEDUCATION: CPT | Mod: CQ

## 2025-05-05 NOTE — PROGRESS NOTES
Outpatient Rehab    Physical Therapy Visit    Patient Name: Sagrario Freire  MRN: [id number]  YOB: 1950  Encounter Date: 5/5/2025    Therapy Diagnosis:   Encounter Diagnosis   Name Primary?    Decreased range of motion (ROM) of right knee Yes     Physician: Hans Brice*    Physician Orders: Eval and Treat  Medical Diagnosis: Unilateral primary osteoarthritis, right knee    Visit # / Visits Authorized:  1 / 20  Insurance Authorization Period: 4/24/2025 to 12/31/2025  Date of Evaluation: 5/2/2025  Plan of Care Certification: 5/2/2025 to 6/27/2025      PT/PTA: PTA   Number of PTA visits since last PT visit:1  Time In: 1430   Time Out: 1530  Total Time (in minutes): 60   Total Billable Time (in minutes): 55    FOTO:  Intake Score: 14%  Survey Score 2:  %  Survey Score 3:  %    Subjective   Patient reports her pain pump came out last night. Since then she has had increased discomfort.  Pain reported as 7/10. Right knee    Objective   Range of Motion:   Knee Right Active   Flexion Pre: 85 degrees  Post: 93 degrees AAROM     Treatment:  Balance/Neuromuscular Re-Education  NMR 1: Patient education: HEP compliance, swelling management, ROM expectations post TKA, importance of extension ROM for proper gait cycle  NMR 2: Russian NMES 10 sec on/30 sec off including: quad sets with towel roll x 15 minutes, SAQs with medium bolster x 15 minutes  NMR 3: Heel prop to improve tissue extensibility and fascilitate quad activation x 3 minutes  NMR 4: Heel slides to improve tissue extensibility and ROM: 10 second hold, 10 reps  Modalities  Cryotherapy (Minutes\Location): cold pack for 5 minutes to Right knee with LE elevated on wedge post session    Time Entry(in minutes):  E-Stim (Unattended) Time Entry: 30  Hot/Cold Pack Time Entry: 5  Neuromuscular Re-Education Time Entry: 55    Assessment & Plan   Assessment: Good tolerance overall to therapeutic interventions noting adequate muscle response throughout.  Initiated NMES for quad strengthening and motor control with use of strap for SAQs to assist with active lifting. Measured 93 degrees AAROM post session.  Evaluation/Treatment Tolerance: Patient tolerated treatment well    Patient will continue to benefit from skilled outpatient physical therapy to address the deficits listed in the problem list box on initial evaluation, provide pt/family education and to maximize pt's level of independence in the home and community environment.     Patient's spiritual, cultural, and educational needs considered and patient agreeable to plan of care and goals.           Plan: Will continue per POC towards treatment goals. PT/PTA met face to face to discuss patient's treatment plan and progress towards established goals. Patient will be seen by physical therapist every sixth visit and minimally once per month.    Goals:   Active       LONG TERM GOALS       Patient will be able to complete the TUG in </= 10 seconds with the least restrictive assistive device to decrease fall risk.  (Progressing)       Start:  05/02/25    Expected End:  06/27/25             Patient will demonstrate >/= 4/5 lower quarter strength to facilitate transfers from sit to stand from various surfaces without restriction. (Progressing)       Start:  05/02/25    Expected End:  06/27/25            Patient will improve 30 second sit to stand to at least 10x for improvement with transfer tasks.   (Progressing)       Start:  05/02/25    Expected End:  06/27/25               SHORT TERM GOALS       Patient will reduce maximal pain rating to < 3/10 pain to facilitate ability to sleep through the night and recover from PT interventions. (Progressing)       Start:  05/02/25    Expected End:  05/30/25            Patient will be able to ambulate for at least 10 minutes with < 3/10 pain to improve walking tolerance.  (Progressing)       Start:  05/02/25    Expected End:  05/30/25            Patient will improve knee range  of motion to at least 0-110 degrees for improved functional performance.  (Progressing)       Start:  05/02/25    Expected End:  05/30/25                Maria C Ford, PTA

## 2025-05-07 ENCOUNTER — CLINICAL SUPPORT (OUTPATIENT)
Dept: REHABILITATION | Facility: HOSPITAL | Age: 75
End: 2025-05-07
Attending: UROLOGY
Payer: MEDICARE

## 2025-05-07 DIAGNOSIS — M25.661 DECREASED RANGE OF MOTION (ROM) OF RIGHT KNEE: Primary | ICD-10-CM

## 2025-05-07 DIAGNOSIS — M17.11 PRIMARY OSTEOARTHRITIS OF RIGHT KNEE: ICD-10-CM

## 2025-05-07 PROCEDURE — 97140 MANUAL THERAPY 1/> REGIONS: CPT

## 2025-05-07 PROCEDURE — 97014 ELECTRIC STIMULATION THERAPY: CPT

## 2025-05-07 PROCEDURE — 97112 NEUROMUSCULAR REEDUCATION: CPT

## 2025-05-07 NOTE — PROGRESS NOTES
Outpatient Rehab    Physical Therapy Visit    Patient Name: Sagrario Freire  MRN: [id number]  YOB: 1950  Encounter Date: 5/7/2025    Therapy Diagnosis:   Encounter Diagnosis   Name Primary?    Decreased range of motion (ROM) of right knee Yes     Physician: Hans Brice*    Physician Orders: Eval and Treat  Medical Diagnosis: Unilateral primary osteoarthritis, right knee    Visit # / Visits Authorized:  2 / 20  Insurance Authorization Period: 4/24/2025 to 12/31/2025  Date of Evaluation: 5/2/2025  Plan of Care Certification: 5/2/2025 to 6/27/2025      PT/PTA: PT   Number of PTA visits since last PT visit:0  Time In: 1055   Time Out: 1148  Total Time (in minutes): 53   Total Billable Time (in minutes): 53    FOTO:  Intake Score:  %  Survey Score 2:  %  Survey Score 3:  %         Subjective   patient reports that her shoulder is really bothering her especially with having to use the walker, she had a bad night sleep last night, and feels like the pain is significant this date. She states that she does not think her medications will last long enough till her follow up and plans to reach out..  Pain reported as 8/10.      Objective            Treatment:  Manual Therapy  MT 1: Grade I-III patella and fat pad mobs  Balance/Neuromuscular Re-Education  NMR 1: Patient education: HEP compliance, swelling management, ROM expectations post TKA, importance of extension ROM for proper gait cycle  NMR 2: Russian NMES 10 sec on/30 sec off including: quad sets with towel roll x 10 minutes, SAQs with 1/2 foam x 10 minutes, SAQs with white foam x 10 minutes  NMR 3: Heel prop to improve tissue extensibility and fascilitate quad activation x 3 minutes  NMR 4: Heel slides to improve tissue extensibility and ROM: 10 second hold, 10 reps (PT assist)    Time Entry(in minutes):  E-Stim (Unattended) Time Entry: 45  Manual Therapy Time Entry: 8  Neuromuscular Re-Education Time Entry: 45    Assessment & Plan    Assessment: Continued with current interventions to address quadriceps activation through nmes and green strap for assistance. Increased pain this date; therefore no progressions at this time. Measured lacking 2 degrees which was not a concern orginially but noted some discomfort with heel propping and performing this at home due to pain. AA flexion assisted with PT this date due to shoulder pain. Measured 95 degrees post session. Educated patient to reach out to MD office if she feels her symptoms of pain are not improving.       Patient will continue to benefit from skilled outpatient physical therapy to address the deficits listed in the problem list box on initial evaluation, provide pt/family education and to maximize pt's level of independence in the home and community environment.     Patient's spiritual, cultural, and educational needs considered and patient agreeable to plan of care and goals.           Plan: consider NuStep next session    Goals:   Active       LONG TERM GOALS       Patient will be able to complete the TUG in </= 10 seconds with the least restrictive assistive device to decrease fall risk.  (Ongoing)       Start:  05/02/25    Expected End:  06/27/25             Patient will demonstrate >/= 4/5 lower quarter strength to facilitate transfers from sit to stand from various surfaces without restriction. (Ongoing)       Start:  05/02/25    Expected End:  06/27/25            Patient will improve 30 second sit to stand to at least 10x for improvement with transfer tasks.   (Ongoing)       Start:  05/02/25    Expected End:  06/27/25               SHORT TERM GOALS       Patient will reduce maximal pain rating to < 3/10 pain to facilitate ability to sleep through the night and recover from PT interventions. (Ongoing)       Start:  05/02/25    Expected End:  05/30/25            Patient will be able to ambulate for at least 10 minutes with < 3/10 pain to improve walking tolerance.  (Ongoing)        Start:  05/02/25    Expected End:  05/30/25            Patient will improve knee range of motion to at least 0-110 degrees for improved functional performance.  (Ongoing)       Start:  05/02/25    Expected End:  05/30/25                Alina Armstorng PT

## 2025-05-08 RX ORDER — OXYCODONE HYDROCHLORIDE 5 MG/1
5 TABLET ORAL EVERY 4 HOURS PRN
Qty: 28 TABLET | Refills: 0 | Status: SHIPPED | OUTPATIENT
Start: 2025-05-08 | End: 2025-05-15

## 2025-05-08 RX ORDER — METHOCARBAMOL 500 MG/1
500 TABLET, FILM COATED ORAL 4 TIMES DAILY
Qty: 56 TABLET | Refills: 0 | Status: SHIPPED | OUTPATIENT
Start: 2025-05-08 | End: 2025-05-23

## 2025-05-09 ENCOUNTER — CLINICAL SUPPORT (OUTPATIENT)
Dept: REHABILITATION | Facility: HOSPITAL | Age: 75
End: 2025-05-09
Attending: UROLOGY
Payer: MEDICARE

## 2025-05-09 DIAGNOSIS — M25.661 DECREASED RANGE OF MOTION (ROM) OF RIGHT KNEE: Primary | ICD-10-CM

## 2025-05-09 PROCEDURE — 97014 ELECTRIC STIMULATION THERAPY: CPT

## 2025-05-09 PROCEDURE — 97140 MANUAL THERAPY 1/> REGIONS: CPT

## 2025-05-09 PROCEDURE — 97112 NEUROMUSCULAR REEDUCATION: CPT

## 2025-05-12 ENCOUNTER — CLINICAL SUPPORT (OUTPATIENT)
Dept: REHABILITATION | Facility: HOSPITAL | Age: 75
End: 2025-05-12
Attending: UROLOGY
Payer: MEDICARE

## 2025-05-12 DIAGNOSIS — M25.661 DECREASED RANGE OF MOTION (ROM) OF RIGHT KNEE: Primary | ICD-10-CM

## 2025-05-12 PROCEDURE — 97112 NEUROMUSCULAR REEDUCATION: CPT | Mod: CQ

## 2025-05-12 PROCEDURE — 97530 THERAPEUTIC ACTIVITIES: CPT | Mod: CQ

## 2025-05-12 PROCEDURE — 97014 ELECTRIC STIMULATION THERAPY: CPT | Mod: CQ

## 2025-05-12 NOTE — PROGRESS NOTES
Outpatient Rehab    Physical Therapy Visit    Patient Name: Sagrario Freire  MRN: [id number]  YOB: 1950  Encounter Date: 5/9/2025    Therapy Diagnosis:   Encounter Diagnosis   Name Primary?    Decreased range of motion (ROM) of right knee Yes     Physician: Hans Brice*    Physician Orders: Eval and Treat  Medical Diagnosis: Unilateral primary osteoarthritis, right knee    Visit # / Visits Authorized:  3 / 20  Insurance Authorization Period: 4/24/2025 to 12/31/2025  Date of Evaluation: 5/2/2025  Plan of Care Certification: 5/2/2025 to 6/27/2025      PT/PTA: PT   Number of PTA visits since last PT visit:0  Time In: 1055   Time Out: 1148  Total Time (in minutes): 53   Total Billable Time (in minutes): 53    FOTO:  Intake Score:  %  Survey Score 2:  %  Survey Score 3:  %    Precautions:       Subjective   patient reports that she started to use her quad cane on her own because it was bothering her shoulder so bad. She states that she is also still in a lot of pain with her knee and reached out for more pain medication..  Pain reported as 7/10. Right knee    Objective            Treatment:  Manual Therapy  MT 1: Grade I-III patella and fat pad mobs  Balance/Neuromuscular Re-Education  NMR 1: Patient education: HEP compliance, swelling management, ROM expectations post TKA, importance of extension ROM for proper gait cycle  NMR 2: Russian NMES 10 sec on/30 sec off including: quad sets with towel roll x 10 minutes, SAQs with 1/2 foam x 10 minutes, SAQs with white foam x 10 minutes  NMR 3: Heel prop to improve tissue extensibility and fascilitate quad activation x 3 minutes  NMR 4: Heel slides to improve tissue extensibility and ROM: 10 second hold, 10 reps (PT assist)    Time Entry(in minutes):  E-Stim (Unattended) Time Entry: 45  Manual Therapy Time Entry: 8  Neuromuscular Re-Education Time Entry: 45    Assessment & Plan   Assessment: Interventions remain as previous session but strong  education and cueing for full terminal knee extension and improving quadriceps activation. Knee rom remains within protocol but not showing progressions within the last few sessions and remaining about 95 degrees AA flexion. Patient has follow up with MD Office later this week for bandage removal.       Patient will continue to benefit from skilled outpatient physical therapy to address the deficits listed in the problem list box on initial evaluation, provide pt/family education and to maximize pt's level of independence in the home and community environment.     Patient's spiritual, cultural, and educational needs considered and patient agreeable to plan of care and goals.           Plan:      Goals:   Active       LONG TERM GOALS       Patient will be able to complete the TUG in </= 10 seconds with the least restrictive assistive device to decrease fall risk.  (Ongoing)       Start:  05/02/25    Expected End:  06/27/25             Patient will demonstrate >/= 4/5 lower quarter strength to facilitate transfers from sit to stand from various surfaces without restriction. (Ongoing)       Start:  05/02/25    Expected End:  06/27/25            Patient will improve 30 second sit to stand to at least 10x for improvement with transfer tasks.   (Ongoing)       Start:  05/02/25    Expected End:  06/27/25               SHORT TERM GOALS       Patient will reduce maximal pain rating to < 3/10 pain to facilitate ability to sleep through the night and recover from PT interventions. (Ongoing)       Start:  05/02/25    Expected End:  05/30/25            Patient will be able to ambulate for at least 10 minutes with < 3/10 pain to improve walking tolerance.  (Ongoing)       Start:  05/02/25    Expected End:  05/30/25            Patient will improve knee range of motion to at least 0-110 degrees for improved functional performance.  (Ongoing)       Start:  05/02/25    Expected End:  05/30/25                Alina Armstrong, PT

## 2025-05-12 NOTE — PROGRESS NOTES
"  Outpatient Rehab    Physical Therapy Visit    Patient Name: Sagrario Freire  MRN: [id number]  YOB: 1950  Encounter Date: 5/12/2025    Therapy Diagnosis:   Encounter Diagnosis   Name Primary?    Decreased range of motion (ROM) of right knee Yes     Physician: Hans Brice*    Physician Orders: Eval and Treat  Medical Diagnosis: Unilateral primary osteoarthritis, right knee    Visit # / Visits Authorized:  4 / 20  Insurance Authorization Period: 4/24/2025 to 12/31/2025  Date of Evaluation: 5/2/2025  Plan of Care Certification: 5/2/2025 to 6/27/2025      PT/PTA: PTA   Number of PTA visits since last PT visit:1  Time In: 1328   Time Out: 1423  Total Time (in minutes): 55   Total Billable Time (in minutes): 55    FOTO:  Intake Score: 14%  Survey Score 2: 40%  Survey Score 3:  %    Precautions: None    Subjective   Patient reports that she stopped using her cane, "I'm trying to do more." Reports that she has some pain, but she expects that.  Pain reported as 4/10. Right knee    Objective    Range of Motion:   Knee Right Active   Flexion Post: 108 degrees AAROM     Treatment:  Balance/Neuromuscular Re-Education  NMR 1: Patient education: HEP compliance, swelling management, ROM expectations post TKA, importance of extension ROM for proper gait cycle  NMR 2: Russian NMES 10 sec on/20 sec off including: quad sets with towel roll x 8 minutes, SAQs with 1/2 foam x 10 minutes, SAQs with white foam x 10 minutes  NMR 4: Heel slides to improve tissue extensibility and ROM: 10 second hold, 5 minutes  Therapeutic Activity  TA 1: Patient education: DOMS, load progression  TA 2: Nustep for CV and LE endurance: 10 minutes, Level 2    Time Entry(in minutes):  E-Stim (Unattended) Time Entry: 28  Neuromuscular Re-Education Time Entry: 45  Therapeutic Activity Time Entry: 10    Assessment & Plan   Assessment: Good tolerance overall to therapeutic interventions noting adequate muscle response repsonse throughout. " Addition of Nustep this session. Measured 108 degrees AAROM Flexion post session.  Evaluation/Treatment Tolerance: Patient tolerated treatment well    Patient will continue to benefit from skilled outpatient physical therapy to address the deficits listed in the problem list box on initial evaluation, provide pt/family education and to maximize pt's level of independence in the home and community environment.     Patient's spiritual, cultural, and educational needs considered and patient agreeable to plan of care and goals.           Plan: Will continue per POC towards treatment goals. PT/PTA met face to face to discuss patient's treatment plan and progress towards established goals. Patient will be seen by physical therapist every sixth visit and minimally once per month.    Goals:   Active       LONG TERM GOALS       Patient will be able to complete the TUG in </= 10 seconds with the least restrictive assistive device to decrease fall risk.  (Progressing)       Start:  05/02/25    Expected End:  06/27/25             Patient will demonstrate >/= 4/5 lower quarter strength to facilitate transfers from sit to stand from various surfaces without restriction. (Progressing)       Start:  05/02/25    Expected End:  06/27/25            Patient will improve 30 second sit to stand to at least 10x for improvement with transfer tasks.   (Progressing)       Start:  05/02/25    Expected End:  06/27/25               SHORT TERM GOALS       Patient will reduce maximal pain rating to < 3/10 pain to facilitate ability to sleep through the night and recover from PT interventions. (Progressing)       Start:  05/02/25    Expected End:  05/30/25            Patient will be able to ambulate for at least 10 minutes with < 3/10 pain to improve walking tolerance.  (Progressing)       Start:  05/02/25    Expected End:  05/30/25            Patient will improve knee range of motion to at least 0-110 degrees for improved functional performance.   (Progressing)       Start:  05/02/25    Expected End:  05/30/25                Maria C Ford PTA

## 2025-05-14 ENCOUNTER — HOSPITAL ENCOUNTER (OUTPATIENT)
Dept: RADIOLOGY | Facility: HOSPITAL | Age: 75
Discharge: HOME | End: 2025-05-14
Attending: PHYSICIAN ASSISTANT
Payer: MEDICARE

## 2025-05-14 ENCOUNTER — CLINICAL SUPPORT (OUTPATIENT)
Dept: REHABILITATION | Facility: HOSPITAL | Age: 75
End: 2025-05-14
Attending: UROLOGY
Payer: MEDICARE

## 2025-05-14 ENCOUNTER — OFFICE VISIT (OUTPATIENT)
Dept: SPORTS MEDICINE | Facility: CLINIC | Age: 75
End: 2025-05-14
Payer: MEDICARE

## 2025-05-14 VITALS
HEART RATE: 102 BPM | DIASTOLIC BLOOD PRESSURE: 79 MMHG | HEIGHT: 66 IN | SYSTOLIC BLOOD PRESSURE: 158 MMHG | WEIGHT: 142.25 LBS | BODY MASS INDEX: 22.86 KG/M2

## 2025-05-14 DIAGNOSIS — M25.561 ACUTE PAIN OF RIGHT KNEE: Primary | ICD-10-CM

## 2025-05-14 DIAGNOSIS — M17.11 PRIMARY OSTEOARTHRITIS OF RIGHT KNEE: ICD-10-CM

## 2025-05-14 DIAGNOSIS — M25.562 LEFT KNEE PAIN, UNSPECIFIED CHRONICITY: ICD-10-CM

## 2025-05-14 DIAGNOSIS — M25.561 ACUTE PAIN OF RIGHT KNEE: ICD-10-CM

## 2025-05-14 DIAGNOSIS — M25.661 DECREASED RANGE OF MOTION (ROM) OF RIGHT KNEE: Primary | ICD-10-CM

## 2025-05-14 PROCEDURE — 1159F MED LIST DOCD IN RCRD: CPT | Mod: CPTII,S$GLB,, | Performed by: PHYSICIAN ASSISTANT

## 2025-05-14 PROCEDURE — 3051F HG A1C>EQUAL 7.0%<8.0%: CPT | Mod: CPTII,S$GLB,, | Performed by: PHYSICIAN ASSISTANT

## 2025-05-14 PROCEDURE — 99999 PR PBB SHADOW E&M-EST. PATIENT-LVL IV: CPT | Mod: PBBFAC,,, | Performed by: PHYSICIAN ASSISTANT

## 2025-05-14 PROCEDURE — 73560 X-RAY EXAM OF KNEE 1 OR 2: CPT | Mod: TC,RT

## 2025-05-14 PROCEDURE — 99024 POSTOP FOLLOW-UP VISIT: CPT | Mod: S$GLB,,, | Performed by: PHYSICIAN ASSISTANT

## 2025-05-14 PROCEDURE — 97014 ELECTRIC STIMULATION THERAPY: CPT | Mod: CQ

## 2025-05-14 PROCEDURE — 3078F DIAST BP <80 MM HG: CPT | Mod: CPTII,S$GLB,, | Performed by: PHYSICIAN ASSISTANT

## 2025-05-14 PROCEDURE — 1125F AMNT PAIN NOTED PAIN PRSNT: CPT | Mod: CPTII,S$GLB,, | Performed by: PHYSICIAN ASSISTANT

## 2025-05-14 PROCEDURE — 1101F PT FALLS ASSESS-DOCD LE1/YR: CPT | Mod: CPTII,S$GLB,, | Performed by: PHYSICIAN ASSISTANT

## 2025-05-14 PROCEDURE — 97112 NEUROMUSCULAR REEDUCATION: CPT | Mod: CQ

## 2025-05-14 PROCEDURE — 73560 X-RAY EXAM OF KNEE 1 OR 2: CPT | Mod: 26,RT,, | Performed by: RADIOLOGY

## 2025-05-14 PROCEDURE — 1160F RVW MEDS BY RX/DR IN RCRD: CPT | Mod: CPTII,S$GLB,, | Performed by: PHYSICIAN ASSISTANT

## 2025-05-14 PROCEDURE — 3288F FALL RISK ASSESSMENT DOCD: CPT | Mod: CPTII,S$GLB,, | Performed by: PHYSICIAN ASSISTANT

## 2025-05-14 PROCEDURE — 3077F SYST BP >= 140 MM HG: CPT | Mod: CPTII,S$GLB,, | Performed by: PHYSICIAN ASSISTANT

## 2025-05-14 RX ORDER — PREGABALIN 75 MG/1
75 CAPSULE ORAL 2 TIMES DAILY
Qty: 28 CAPSULE | Refills: 0 | Status: SHIPPED | OUTPATIENT
Start: 2025-05-14 | End: 2025-05-28

## 2025-05-14 RX ORDER — OXYCODONE HYDROCHLORIDE 5 MG/1
5 TABLET ORAL EVERY 4 HOURS PRN
Qty: 28 TABLET | Refills: 0 | Status: SHIPPED | OUTPATIENT
Start: 2025-05-14 | End: 2025-05-21

## 2025-05-14 NOTE — PROGRESS NOTES
S:Sagrario Freire presents for post-operative evaluation.     DATE OF PROCEDURE: 4/30/2025      PROCEDURES PERFORMED:   Right total knee arthroplasty (CPT 28252)    Sagrario Freire reports to be doing well 2wk s/p the above mentioned procedure. Denies fevers, chills, night sweats, chest pain, difficulty breathing, calf pain or tenderness. Going to PT 3xWeek at the [place] location. Seeing good progress daily. Pain levels are improving. Taking Oxycodone for PT..     O: The incision is healing well.  No signs of infection.  Staples were removed. No significant pain or unusual tenderness. aaROM 0-105. Quad firing well. Able to straight leg raise.    Imaging:  Plain radiographs of the right knee demonstrate post operative total knee arthroplasty prosthesis in place and intact without complications.     A/P: Doing good. Incision healing well. Ok to shower with incisions uncovered. No submerging at this point.Images were reviewed with the patient. No dentist for 3 months post op. Refilled Oxy and Lyrica. Plan to follow the rehab plan as previously outlined. RTC in 4 weeks.

## 2025-05-14 NOTE — PROGRESS NOTES
Outpatient Rehab    Physical Therapy Visit    Patient Name: Sagrario Freire  MRN: [id number]  YOB: 1950  Encounter Date: 5/14/2025    Therapy Diagnosis:   Encounter Diagnosis   Name Primary?    Decreased range of motion (ROM) of right knee Yes     Physician: Hans Brice*    Physician Orders: Eval and Treat  Medical Diagnosis: Unilateral primary osteoarthritis, right knee    Visit # / Visits Authorized:  5 / 20  Insurance Authorization Period: 4/24/2025 to 12/31/2025  Date of Evaluation: 5/2/2025  Plan of Care Certification: 5/2/2025 to 6/27/2025      PT/PTA: PTA   Number of PTA visits since last PT visit:2  Time In: 0947   Time Out: 1047  Total Time (in minutes): 60   Total Billable Time (in minutes): 38    FOTO:  Intake Score: 14%  Survey Score 2: 40%  Survey Score 3:  %    Precautions: None    Subjective   Patient reports her knee is bothering her after having the stitches removed before the session.  Pain reported as 6/10. Right knee    Objective  Objective Measures updated at progress report unless specified.     Treatment:  Balance/Neuromuscular Re-Education  NMR 1: Patient education: HEP compliance, swelling management, ROM expectations post TKA, importance of extension ROM for proper gait cycle  NMR 2: Russian NMES 10 sec on/20 sec off including: quad sets with towel roll x 8 minutes, SAQs with 1/2 foam x 10 minutes, SAQs with white foam x 10 minutes, LAQs at EOM x 4 minutes  Therapeutic Activity  TA 1: Patient education: DOMS, load progression  TA 2: Nustep for CV and LE endurance: 10 minutes, Level 2  Modalities  Cryotherapy (Minutes\Location): cold pack for 8 minutes to Right knee post session    Time Entry(in minutes):  E-Stim (Unattended) Time Entry: 32  Hot/Cold Pack Time Entry: 8  Neuromuscular Re-Education Time Entry: 40  Therapeutic Activity Time Entry: 12    Assessment & Plan   Assessment: Limited progression this session secondary to increased pain following MD visit and  staple removal. Attempted LAQs at EOM, however patient unable to tolerate more than 4 minutes. Held ROM measurements secondary to pain and patient request. Cold pack applied post session to address pain.  Evaluation/Treatment Tolerance: Patient limited by pain    Patient will continue to benefit from skilled outpatient physical therapy to address the deficits listed in the problem list box on initial evaluation, provide pt/family education and to maximize pt's level of independence in the home and community environment.     Patient's spiritual, cultural, and educational needs considered and patient agreeable to plan of care and goals.           Plan: Will continue per POC towards treatment goals. PT/PTA met face to face to discuss patient's treatment plan and progress towards established goals. Patient will be seen by physical therapist every sixth visit and minimally once per month.    Goals:   Active       LONG TERM GOALS       Patient will be able to complete the TUG in </= 10 seconds with the least restrictive assistive device to decrease fall risk.  (Progressing)       Start:  05/02/25    Expected End:  06/27/25             Patient will demonstrate >/= 4/5 lower quarter strength to facilitate transfers from sit to stand from various surfaces without restriction. (Progressing)       Start:  05/02/25    Expected End:  06/27/25            Patient will improve 30 second sit to stand to at least 10x for improvement with transfer tasks.   (Progressing)       Start:  05/02/25    Expected End:  06/27/25               SHORT TERM GOALS       Patient will reduce maximal pain rating to < 3/10 pain to facilitate ability to sleep through the night and recover from PT interventions. (Progressing)       Start:  05/02/25    Expected End:  05/30/25            Patient will be able to ambulate for at least 10 minutes with < 3/10 pain to improve walking tolerance.  (Progressing)       Start:  05/02/25    Expected End:  05/30/25             Patient will improve knee range of motion to at least 0-110 degrees for improved functional performance.  (Progressing)       Start:  05/02/25    Expected End:  05/30/25                Maria C Ford, PTA

## 2025-05-16 ENCOUNTER — CLINICAL SUPPORT (OUTPATIENT)
Dept: REHABILITATION | Facility: HOSPITAL | Age: 75
End: 2025-05-16
Attending: UROLOGY
Payer: MEDICARE

## 2025-05-16 DIAGNOSIS — M25.661 DECREASED RANGE OF MOTION (ROM) OF RIGHT KNEE: Primary | ICD-10-CM

## 2025-05-16 PROCEDURE — 97014 ELECTRIC STIMULATION THERAPY: CPT

## 2025-05-16 PROCEDURE — 97140 MANUAL THERAPY 1/> REGIONS: CPT

## 2025-05-16 PROCEDURE — 97112 NEUROMUSCULAR REEDUCATION: CPT

## 2025-05-19 ENCOUNTER — CLINICAL SUPPORT (OUTPATIENT)
Dept: REHABILITATION | Facility: HOSPITAL | Age: 75
End: 2025-05-19
Attending: UROLOGY
Payer: MEDICARE

## 2025-05-19 DIAGNOSIS — M25.661 DECREASED RANGE OF MOTION (ROM) OF RIGHT KNEE: Primary | ICD-10-CM

## 2025-05-19 PROCEDURE — 97014 ELECTRIC STIMULATION THERAPY: CPT | Mod: CQ

## 2025-05-19 PROCEDURE — 97112 NEUROMUSCULAR REEDUCATION: CPT | Mod: CQ

## 2025-05-19 PROCEDURE — 97530 THERAPEUTIC ACTIVITIES: CPT | Mod: CQ

## 2025-05-19 NOTE — PROGRESS NOTES
"  Outpatient Rehab    Physical Therapy Visit    Patient Name: Sagrario Freire  MRN: [id number]  YOB: 1950  Encounter Date: 5/19/2025    Therapy Diagnosis:   Encounter Diagnosis   Name Primary?    Decreased range of motion (ROM) of right knee Yes     Physician: Hans Brice*    Physician Orders: Eval and Treat  Medical Diagnosis: Unilateral primary osteoarthritis, right knee    Visit # / Visits Authorized:  7 / 20  Insurance Authorization Period: 4/24/2025 to 12/31/2025  Date of Evaluation: 5/2/2025  Plan of Care Certification: 5/2/2025 to 6/27/2025      PT/PTA: PTA   Number of PTA visits since last PT visit:1  Time In: 1100   Time Out: 1200  Total Time (in minutes): 60   Total Billable Time (in minutes): 60    FOTO:  Intake Score: 14%  Survey Score 2: 40%  Survey Score 3:  %    Precautions: none    Subjective   Patient reports sleeping the other day and "twisting" her knee. Reports pain for the rest of the day, but it has since decreased.  Pain reported as 3/10. Right knee    Objective  Objective Measures updated at progress report unless specified.     Treatment:  Balance/Neuromuscular Re-Education  NMR 1: Patient education: HEP compliance, swelling management, ROM expectations post TKA, importance of extension ROM for proper gait cycle  NMR 2: Russian NMES 10 sec on/20 sec off including: SAQs with 1/2 foam x 10 minutes with strap, LAQs at EOM x 10 minutes  NMR 5: Standing TKE with ball: 10 second hold, 20 reps  Therapeutic Activity  TA 1: Patient education: DOMS, load progression  TA 2: Recumbent Bike for CV and LE endurance: 10 minutes, Level 2 (seat 10)  TA 3: Step ups onto 4-inch step: 3 x 10 reps with 3 sec tke  TA 4: Sit to stands from elevated surface: 2 x 10 reps    Time Entry(in minutes):  E-Stim (Unattended) Time Entry: 20  Neuromuscular Re-Education Time Entry: 35  Therapeutic Activity Time Entry: 25    Assessment & Plan   Assessment: Addition of step ups, sit to stands, and " "standing tke to address functional tolerance to ADL's. She demonstrated good tolerance overall with focus on terminal knee extension. Modified short arc quads due to medial knee pain; patient described pain as "burning" likely due to nerve discomfort.  Evaluation/Treatment Tolerance: Patient tolerated treatment well    Patient will continue to benefit from skilled outpatient physical therapy to address the deficits listed in the problem list box on initial evaluation, provide pt/family education and to maximize pt's level of independence in the home and community environment.     Patient's spiritual, cultural, and educational needs considered and patient agreeable to plan of care and goals.           Plan: Will continue per POC towards treatment goals. PT/PTA met face to face to discuss patient's treatment plan and progress towards established goals. Patient will be seen by physical therapist every sixth visit and minimally once per month.    Goals:   Active       LONG TERM GOALS       Patient will be able to complete the TUG in </= 10 seconds with the least restrictive assistive device to decrease fall risk.  (Progressing)       Start:  05/02/25    Expected End:  06/27/25             Patient will demonstrate >/= 4/5 lower quarter strength to facilitate transfers from sit to stand from various surfaces without restriction. (Progressing)       Start:  05/02/25    Expected End:  06/27/25            Patient will improve 30 second sit to stand to at least 10x for improvement with transfer tasks.   (Progressing)       Start:  05/02/25    Expected End:  06/27/25               SHORT TERM GOALS       Patient will reduce maximal pain rating to < 3/10 pain to facilitate ability to sleep through the night and recover from PT interventions. (Progressing)       Start:  05/02/25    Expected End:  05/30/25            Patient will be able to ambulate for at least 10 minutes with < 3/10 pain to improve walking tolerance.  " (Progressing)       Start:  05/02/25    Expected End:  05/30/25            Patient will improve knee range of motion to at least 0-110 degrees for improved functional performance.  (Progressing)       Start:  05/02/25    Expected End:  05/30/25                Maria C Ford, PTA

## 2025-05-21 ENCOUNTER — CLINICAL SUPPORT (OUTPATIENT)
Dept: REHABILITATION | Facility: HOSPITAL | Age: 75
End: 2025-05-21
Attending: UROLOGY
Payer: MEDICARE

## 2025-05-21 DIAGNOSIS — M25.661 DECREASED RANGE OF MOTION (ROM) OF RIGHT KNEE: Primary | ICD-10-CM

## 2025-05-21 PROCEDURE — 97112 NEUROMUSCULAR REEDUCATION: CPT

## 2025-05-21 PROCEDURE — 97530 THERAPEUTIC ACTIVITIES: CPT

## 2025-05-21 PROCEDURE — 97140 MANUAL THERAPY 1/> REGIONS: CPT

## 2025-05-21 PROCEDURE — 97014 ELECTRIC STIMULATION THERAPY: CPT

## 2025-05-23 ENCOUNTER — CLINICAL SUPPORT (OUTPATIENT)
Dept: REHABILITATION | Facility: HOSPITAL | Age: 75
End: 2025-05-23
Attending: UROLOGY
Payer: MEDICARE

## 2025-05-23 DIAGNOSIS — M25.661 DECREASED RANGE OF MOTION (ROM) OF RIGHT KNEE: Primary | ICD-10-CM

## 2025-05-23 PROCEDURE — 97530 THERAPEUTIC ACTIVITIES: CPT | Mod: CQ

## 2025-05-23 NOTE — PROGRESS NOTES
Outpatient Rehab    Physical Therapy Visit    Patient Name: Sagrario Freire  MRN: [id number]  YOB: 1950  Encounter Date: 5/23/2025    Therapy Diagnosis:   Encounter Diagnosis   Name Primary?    Decreased range of motion (ROM) of right knee Yes     Physician: Hans Brice*    Physician Orders: Eval and Treat  Medical Diagnosis: Unilateral primary osteoarthritis, right knee    Visit # / Visits Authorized:  9 / 20  Insurance Authorization Period: 4/24/2025 to 12/31/2025  Date of Evaluation: 5/2/2025  Plan of Care Certification: 5/2/2025 to 6/27/2025      PT/PTA: PTA   Number of PTA visits since last PT visit:2  Time In: 1230   Time Out: 1326  Total Time (in minutes): 56   Total Billable Time (in minutes): 30    FOTO:  Intake Score: 14%  Survey Score 2: 40%  Survey Score 3:  %    Precautions: None    Subjective   Patient reports she has restless legs at night, but knee is otherwise doing ok.  Pain reported as 0/10. Right knee    Objective  Objective Measures updated at progress report unless specified.     Treatment:  Balance/Neuromuscular Re-Education  NMR 1: Patient education: HEP compliance, swelling management, ROM expectations post TKA, importance of extension ROM for proper gait cycle  NMR 2: SAQs with 1/2 foam x 5-10 sec hold, 3 x 10 reps, LAQs at EOM + 2 lb AW x 5 sec hold, 3 x 10 reps  NMR 3: Heel prop to improve tissue extensibility and fascilitate quad activation x 4 minutes with 4 lb AW above and below knee joint  NMR 5: Standing TKE with ball: 10 second hold, 30 reps  Therapeutic Activity  TA 1: Patient education: DOMS, load progression  TA 2: Recumbent Bike for CV and LE endurance: 10 minutes, Level 2 (seat 10)  TA 3: Step ups onto 6-inch step: 3 x 10 reps with 3 sec tke -- Lateral step ups onto 6-inch step: 3 x 10 reps  TA 4: Sit to stands from elevated surface: 2 x 10 reps  TA 5: Shuttle DL Press: 25 lbs, 3 x 10 reps with 3 sec tke    Time Entry(in minutes):  Neuromuscular  Re-Education Time Entry: 26  Therapeutic Activity Time Entry: 30    Assessment & Plan   Assessment: Addition of shuttle double leg press at light resistance noting adequate muscle response throughout. Poor tolerance to heel prop however able to complete for 4 minutes with visible improvement in knee extension ROM.  Evaluation/Treatment Tolerance: Patient tolerated treatment well    The patient will continue to benefit from skilled outpatient physical therapy in order to address the deficits listed in the problem list on the initial evaluation, provide patient and family education, and maximize the patients level of independence in the home and community environments.     The patient's spiritual, cultural, and educational needs were considered, and the patient is agreeable to the plan of care and goals.           Plan: Will continue per POC towards treatment goals. PT/PTA met face to face to discuss patient's treatment plan and progress towards established goals. Patient will be seen by physical therapist every sixth visit and minimally once per month.    Goals:   Active       LONG TERM GOALS       Patient will be able to complete the TUG in </= 10 seconds with the least restrictive assistive device to decrease fall risk.  (Progressing)       Start:  05/02/25    Expected End:  06/27/25             Patient will demonstrate >/= 4/5 lower quarter strength to facilitate transfers from sit to stand from various surfaces without restriction. (Progressing)       Start:  05/02/25    Expected End:  06/27/25            Patient will improve 30 second sit to stand to at least 10x for improvement with transfer tasks.   (Progressing)       Start:  05/02/25    Expected End:  06/27/25               SHORT TERM GOALS       Patient will reduce maximal pain rating to < 3/10 pain to facilitate ability to sleep through the night and recover from PT interventions. (Progressing)       Start:  05/02/25    Expected End:  05/30/25             Patient will be able to ambulate for at least 10 minutes with < 3/10 pain to improve walking tolerance.  (Progressing)       Start:  05/02/25    Expected End:  05/30/25            Patient will improve knee range of motion to at least 0-110 degrees for improved functional performance.  (Progressing)       Start:  05/02/25    Expected End:  05/30/25                Maria C Ford, PTA

## 2025-05-26 ENCOUNTER — DOCUMENTATION ONLY (OUTPATIENT)
Dept: REHABILITATION | Facility: HOSPITAL | Age: 75
End: 2025-05-26
Payer: MEDICARE

## 2025-05-26 DIAGNOSIS — M17.11 PRIMARY OSTEOARTHRITIS OF RIGHT KNEE: ICD-10-CM

## 2025-05-26 RX ORDER — CELECOXIB 200 MG/1
200 CAPSULE ORAL 2 TIMES DAILY
Qty: 56 CAPSULE | Refills: 0 | Status: SHIPPED | OUTPATIENT
Start: 2025-05-26 | End: 2025-06-23

## 2025-05-26 RX ORDER — OXYCODONE HYDROCHLORIDE 5 MG/1
5 TABLET ORAL EVERY 4 HOURS PRN
Qty: 28 TABLET | Refills: 0 | Status: SHIPPED | OUTPATIENT
Start: 2025-05-26 | End: 2025-06-02

## 2025-05-26 NOTE — PROGRESS NOTES
"Physical Therapy: No show/Cancellation of Visit  Date: 05/26/2025    Patient was a cancel to today's PT appointment. Reason for cancellation: "bad sinus headache". Patient's next scheduled appointment is Wednesday, 5/28/2025 at 11AM.    Cancel: 1  No show: 0    Therapist: Maria C Ford PTA      "

## 2025-05-28 ENCOUNTER — CLINICAL SUPPORT (OUTPATIENT)
Dept: REHABILITATION | Facility: HOSPITAL | Age: 75
End: 2025-05-28
Attending: UROLOGY
Payer: MEDICARE

## 2025-05-28 DIAGNOSIS — M25.661 DECREASED RANGE OF MOTION (ROM) OF RIGHT KNEE: Primary | ICD-10-CM

## 2025-05-28 PROCEDURE — 97112 NEUROMUSCULAR REEDUCATION: CPT

## 2025-05-28 PROCEDURE — 97530 THERAPEUTIC ACTIVITIES: CPT

## 2025-05-29 NOTE — PROGRESS NOTES
Outpatient Rehab    Physical Therapy Visit    Patient Name: Sagrario Freire  MRN: [id number]  YOB: 1950  Encounter Date: 5/21/2025    Therapy Diagnosis:   Encounter Diagnosis   Name Primary?    Decreased range of motion (ROM) of right knee Yes     Physician: Hans Brice*    Physician Orders: Eval and Treat  Medical Diagnosis: Unilateral primary osteoarthritis, right knee    Visit # / Visits Authorized:  8 / 20  Insurance Authorization Period: 4/24/2025 to 12/31/2025  Date of Evaluation: 5/2/2025  Plan of Care Certification: 5/2/2025 to 6/27/2025      PT/PTA: PT   Number of PTA visits since last PT visit:0  Time In: 1100   Time Out: 1200  Total Time (in minutes): 60   Total Billable Time (in minutes): 60    FOTO:  Intake Score:  %  Survey Score 2:  %  Survey Score 3:  %    Precautions:       Subjective   patient states that she is still having some pulling in the back of her leg and having a hard time with heel propping at home..  Pain reported as 2/10. Right knee    Objective            Treatment:  Manual Therapy  MT 1: Grade I-III patella and fat pad mobs  Balance/Neuromuscular Re-Education  NMR 1: Patient education: HEP compliance, swelling management, ROM expectations post TKA, importance of extension ROM for proper gait cycle  NMR 2: SAQs with 1/2 foam x 5-10 sec hold, 3 x 10 reps, LAQs at EOM + 2 lb AW x 5 sec hold, 3 x 10 reps  NMR 3: Heel prop to improve tissue extensibility and fascilitate quad activation x 4 minutes with 4 lb AW above and below knee joint  NMR 4: Heel slides to improve tissue extensibility and ROM: 10 second hold, 5 minutes  NMR 5: Standing TKE with ball: 10 second hold, 30 reps  Therapeutic Activity  TA 1: Patient education: DOMS, load progression  TA 2: Recumbent Bike for CV and LE endurance: 10 minutes, Level 2 (seat 10)  TA 3: Step ups onto 6-inch step: 3 x 10 reps with 3 sec tke -- Lateral step ups onto 6-inch step: 3 x 10 reps  TA 4: Sit to stands from  elevated surface: 2 x 10 reps  TA 5: Shuttle DL Press: 25 lbs, 3 x 10 reps with 3 sec tke    Time Entry(in minutes):  E-Stim (Unattended) Time Entry: 30  Manual Therapy Time Entry: 8  Neuromuscular Re-Education Time Entry: 34  Therapeutic Activity Time Entry: 18    Assessment & Plan   Assessment: Continued with exercise interventions and focus remains on quadriceps activation and gait training for proper performance of tasks and reduce knee extension lag with stance phase of gait.       The patient will continue to benefit from skilled outpatient physical therapy in order to address the deficits listed in the problem list on the initial evaluation, provide patient and family education, and maximize the patients level of independence in the home and community environments.     The patient's spiritual, cultural, and educational needs were considered, and the patient is agreeable to the plan of care and goals.           Plan:      Goals:   Active       LONG TERM GOALS       Patient will be able to complete the TUG in </= 10 seconds with the least restrictive assistive device to decrease fall risk.  (Progressing)       Start:  05/02/25    Expected End:  06/27/25             Patient will demonstrate >/= 4/5 lower quarter strength to facilitate transfers from sit to stand from various surfaces without restriction. (Progressing)       Start:  05/02/25    Expected End:  06/27/25            Patient will improve 30 second sit to stand to at least 10x for improvement with transfer tasks.   (Progressing)       Start:  05/02/25    Expected End:  06/27/25               SHORT TERM GOALS       Patient will reduce maximal pain rating to < 3/10 pain to facilitate ability to sleep through the night and recover from PT interventions. (Progressing)       Start:  05/02/25    Expected End:  05/30/25            Patient will be able to ambulate for at least 10 minutes with < 3/10 pain to improve walking tolerance.  (Progressing)        Start:  05/02/25    Expected End:  05/30/25            Patient will improve knee range of motion to at least 0-110 degrees for improved functional performance.  (Progressing)       Start:  05/02/25    Expected End:  05/30/25                Alina Armstrong, PT

## 2025-05-29 NOTE — PROGRESS NOTES
Outpatient Rehab    Physical Therapy Progress Note    Patient Name: Sagrario Freire  MRN: [id number]  YOB: 1950  Encounter Date: 2025    Therapy Diagnosis:   Encounter Diagnosis   Name Primary?    Decreased range of motion (ROM) of right knee Yes     Physician: Hans Brice*    Physician Orders: Eval and Treat  Medical Diagnosis: Unilateral primary osteoarthritis, right knee    Visit # / Visits Authorized:  10 / 20  Insurance Authorization Period: 2025 to 2025  Date of Evaluation: 2025  Plan of Care Certification: 2025 to 2025      PT/PTA: PT   Number of PTA visits since last PT visit:0  Time In: 1100   Time Out: 1148  Total Time (in minutes): 48   Total Billable Time (in minutes): 38    FOTO:  Intake Score:  %  Survey Score 2:  %  Survey Score 3:  %    Precautions:       Subjective   patient reports that she is not feeling good today because she had a tweak in her back. Patient states that she still has some pulling pain into the anterior knee that comes and goes. Patient states that otherwise she is doing well and feels like she can do anything she wants besides very long distance walking.  Pain reported as 2/10.      Objective          Reassessment  ROM: 0 degrees + quad set; 116 degrees flexion  30SSTS: 11x  TU seconds    Treatment:  Balance/Neuromuscular Re-Education  NMR 1: Patient education: HEP compliance, swelling management, ROM expectations post TKA, importance of extension ROM for proper gait cycle  NMR 2: SAQs with 1/2 foam x 5-10 sec hold, 3 x 10 reps, LAQs at EOM + 2 lb AW x 5 sec hold, 3 x 10 reps  NMR 3: Heel prop to improve tissue extensibility and fascilitate quad activation x 4 minutes with 4 lb AW above and below knee joint  NMR 4: Heel slides to improve tissue extensibility and ROM: 10 second hold, 5 minutes  NMR 5: Standing TKE with ball: 10 second hold, 30 reps  Therapeutic Activity  TA 1: Patient education: DOMS, load  progression  TA 2: Recumbent Bike for CV and LE endurance: 10 minutes, Level 2 (seat 10)  TA 3: Step ups onto 6-inch step: 3 x 10 reps with 3 sec tke -- Lateral step ups onto 6-inch step: 3 x 10 reps  TA 4: Sit to stands from elevated surface: 2 x 10 reps  TA 5: Shuttle DL Press: 25 lbs, 3 x 10 reps with 3 sec tke    Time Entry(in minutes):  Neuromuscular Re-Education Time Entry: 25  Therapeutic Activity Time Entry: 23    Assessment & Plan   Assessment: Strong focus on gait training this date to assist with knee extension during stance phase. Able to perform without deficit with verbal cueing but does not always perform 100% of the time. Discussed importance of quadriceps strength to reduce pulling load on anterior knee and spent significant amount of time and cueing on performance of proper sit to stand to reduce knee valgus load, increase hip hinging to reduce anterior knee load. Will continue to progress as tolerated. Knee rom measured 0 degrees with quad set and 116 degrees flexion.       The patient will continue to benefit from skilled outpatient physical therapy in order to address the deficits listed in the problem list on the initial evaluation, provide patient and family education, and maximize the patients level of independence in the home and community environments.     The patient's spiritual, cultural, and educational needs were considered, and the patient is agreeable to the plan of care and goals.           Plan:      Goals:   Active       LONG TERM GOALS       Patient will be able to complete the TUG in </= 10 seconds with the least restrictive assistive device to decrease fall risk.  (Met)       Start:  05/02/25    Expected End:  06/27/25    Resolved:  05/29/25          Patient will demonstrate >/= 4/5 lower quarter strength to facilitate transfers from sit to stand from various surfaces without restriction. (Ongoing)       Start:  05/02/25    Expected End:  06/27/25            Patient will  improve 30 second sit to stand to at least 10x for improvement with transfer tasks.   (Met)       Start:  05/02/25    Expected End:  06/27/25    Resolved:  05/29/25            SHORT TERM GOALS       Patient will reduce maximal pain rating to < 3/10 pain to facilitate ability to sleep through the night and recover from PT interventions. (Ongoing)       Start:  05/02/25    Expected End:  05/30/25            Patient will be able to ambulate for at least 10 minutes with < 3/10 pain to improve walking tolerance.  (Met)       Start:  05/02/25    Expected End:  05/30/25    Resolved:  05/29/25         Patient will improve knee range of motion to at least 0-110 degrees for improved functional performance.  (Met)       Start:  05/02/25    Expected End:  05/30/25    Resolved:  05/29/25             Alina Armstrong, PT

## 2025-05-29 NOTE — PROGRESS NOTES
Outpatient Rehab    Physical Therapy Visit    Patient Name: Sagrario Freire  MRN: [id number]  YOB: 1950  Encounter Date: 5/16/2025    Therapy Diagnosis:   Encounter Diagnosis   Name Primary?    Decreased range of motion (ROM) of right knee Yes     Physician: Hans Brice*    Physician Orders: Eval and Treat  Medical Diagnosis: Unilateral primary osteoarthritis, right knee    Visit # / Visits Authorized:  10 / 20  Insurance Authorization Period: 4/24/2025 to 12/31/2025  Date of Evaluation: 5/2/2025  Plan of Care Certification: 5/2/2025 to 6/27/2025      PT/PTA: PT   Number of PTA visits since last PT visit:0  Time In: 1230   Time Out: 1330  Total Time (in minutes): 60   Total Billable Time (in minutes): 32    FOTO:  Intake Score:  %  Survey Score 2:  %  Survey Score 3:  %    Precautions:       Subjective   no new complaints besides some tenderness along the incision.  Pain reported as 2/10. Right knee    Objective            Treatment:  Balance/Neuromuscular Re-Education  NMR 1: Patient education: HEP compliance, swelling management, ROM expectations post TKA, importance of extension ROM for proper gait cycle  NMR 2: SAQs with 1/2 foam x 5-10 sec hold, 3 x 10 reps, LAQs at EOM + 2 lb AW x 5 sec hold, 3 x 10 reps  NMR 3: Heel prop to improve tissue extensibility and fascilitate quad activation x 4 minutes with 4 lb AW above and below knee joint  NMR 4: Heel slides to improve tissue extensibility and ROM: 10 second hold, 5 minutes  NMR 5: Standing TKE with ball: 10 second hold, 30 reps  Therapeutic Activity  TA 1: Patient education: DOMS, load progression  TA 2: Recumbent Bike for CV and LE endurance: 10 minutes, Level 2 (seat 10)  TA 3: Step ups onto 6-inch step: 3 x 10 reps with 3 sec tke -- Lateral step ups onto 6-inch step: 3 x 10 reps  TA 4: Sit to stands from elevated surface: 2 x 10 reps  TA 5: Shuttle DL Press: 25 lbs, 3 x 10 reps with 3 sec tke    Time Entry(in minutes):  E-Stim  (Unattended) Time Entry: 30  Manual Therapy Time Entry: 8  Neuromuscular Re-Education Time Entry: 34  Therapeutic Activity Time Entry: 18    Assessment & Plan   Assessment: Progressed with tke with ball for continued focus on quadriceps activation. Patient able to perform with minimal cueing for performance. Continue to require consistent cueing and performance of heel prop for knee extension rom and poor tolerance to this exercise. Continue to encourage hep to address this remaining deficit.       The patient will continue to benefit from skilled outpatient physical therapy in order to address the deficits listed in the problem list on the initial evaluation, provide patient and family education, and maximize the patients level of independence in the home and community environments.     The patient's spiritual, cultural, and educational needs were considered, and the patient is agreeable to the plan of care and goals.           Plan:      Goals:   Active       LONG TERM GOALS       Patient will be able to complete the TUG in </= 10 seconds with the least restrictive assistive device to decrease fall risk.  (Progressing)       Start:  05/02/25    Expected End:  06/27/25             Patient will demonstrate >/= 4/5 lower quarter strength to facilitate transfers from sit to stand from various surfaces without restriction. (Progressing)       Start:  05/02/25    Expected End:  06/27/25            Patient will improve 30 second sit to stand to at least 10x for improvement with transfer tasks.   (Progressing)       Start:  05/02/25    Expected End:  06/27/25               SHORT TERM GOALS       Patient will reduce maximal pain rating to < 3/10 pain to facilitate ability to sleep through the night and recover from PT interventions. (Progressing)       Start:  05/02/25    Expected End:  05/30/25            Patient will be able to ambulate for at least 10 minutes with < 3/10 pain to improve walking tolerance.   (Progressing)       Start:  05/02/25    Expected End:  05/30/25            Patient will improve knee range of motion to at least 0-110 degrees for improved functional performance.  (Progressing)       Start:  05/02/25    Expected End:  05/30/25                Alina Armstrong, PT

## 2025-05-30 ENCOUNTER — CLINICAL SUPPORT (OUTPATIENT)
Dept: REHABILITATION | Facility: HOSPITAL | Age: 75
End: 2025-05-30
Attending: UROLOGY
Payer: MEDICARE

## 2025-05-30 DIAGNOSIS — M25.661 DECREASED RANGE OF MOTION (ROM) OF RIGHT KNEE: Primary | ICD-10-CM

## 2025-05-30 PROCEDURE — 97530 THERAPEUTIC ACTIVITIES: CPT

## 2025-05-31 NOTE — PROGRESS NOTES
Outpatient Rehab    Physical Therapy Visit    Patient Name: Sagrario Freire  MRN: [id number]  YOB: 1950  Encounter Date: 5/30/2025    Therapy Diagnosis:   Encounter Diagnosis   Name Primary?    Decreased range of motion (ROM) of right knee Yes     Physician: Hans Brice*    Physician Orders: Eval and Treat  Medical Diagnosis: Unilateral primary osteoarthritis, right knee    Visit # / Visits Authorized:  11 / 20  Insurance Authorization Period: 4/24/2025 to 12/31/2025  Date of Evaluation: 5/2/2025  Plan of Care Certification: 5/2/2025 to 6/27/2025      PT/PTA: PT   Number of PTA visits since last PT visit:0  Time In: 1100   Time Out: 1153  Total Time (in minutes): 53   Total Billable Time (in minutes): 23    FOTO:  Intake Score:  %  Survey Score 2:  %  Survey Score 3:  %    Precautions:       Subjective   patient states that she is doing well and does not have much pain at all. She would like to try her squats today from a chair..  Pain reported as 1/10. Right knee    Objective            Treatment:  Balance/Neuromuscular Re-Education  NMR 1: Patient education: HEP compliance, swelling management, ROM expectations post TKA, importance of extension ROM for proper gait cycle  NMR 2: SAQs with 1/2 foam x 5-10 sec hold, 3 x 10 reps, LAQs at EOM + 2 lb AW x 5 sec hold, 3 x 10 reps  NMR 3: Heel prop to improve tissue extensibility and fascilitate quad activation x 4 minutes with 4 lb AW above and below knee joint  NMR 4: Heel slides to improve tissue extensibility and ROM: 10 second hold, 5 minutes  NMR 5: Standing TKE with ball: 10 second hold, 30 reps  Therapeutic Activity  TA 1: Patient education: DOMS, load progression  TA 2: Recumbent Bike for CV and LE endurance: 10 minutes, Level 2 (seat 10)  TA 3: Step ups onto 6-inch step: 3 x 10 reps with 3 sec tke -- Lateral step ups onto 6-inch step: 3 x 10 reps  TA 4: Sit to stands from std chair: 3 x 10 reps  TA 5: Shuttle DL Press: 1 black and 1  red cord, 3 x 10 reps with 3 sec tke  TA 6: Shuttle SL Press; 1 black cord 3 x 10 with 3 sec tke    Time Entry(in minutes):  Neuromuscular Re-Education Time Entry: 25  Therapeutic Activity Time Entry: 28    Assessment & Plan   Assessment: Focused on closed chain squatting mechanics with focus on proper hip hinging to reduce anterior knee pain load due to bilateral tibial translation due to poor glut loading and activation. Will continue to focus on closed chain strengthening exercises in upcoming sessions.       The patient will continue to benefit from skilled outpatient physical therapy in order to address the deficits listed in the problem list on the initial evaluation, provide patient and family education, and maximize the patients level of independence in the home and community environments.     The patient's spiritual, cultural, and educational needs were considered, and the patient is agreeable to the plan of care and goals.           Plan: consider hurdles, lateral step downs    Goals:   Active       LONG TERM GOALS       Patient will be able to complete the TUG in </= 10 seconds with the least restrictive assistive device to decrease fall risk.  (Met)       Start:  05/02/25    Expected End:  06/27/25    Resolved:  05/29/25          Patient will demonstrate >/= 4/5 lower quarter strength to facilitate transfers from sit to stand from various surfaces without restriction. (Ongoing)       Start:  05/02/25    Expected End:  06/27/25            Patient will improve 30 second sit to stand to at least 10x for improvement with transfer tasks.   (Met)       Start:  05/02/25    Expected End:  06/27/25    Resolved:  05/29/25            SHORT TERM GOALS       Patient will reduce maximal pain rating to < 3/10 pain to facilitate ability to sleep through the night and recover from PT interventions. (Ongoing)       Start:  05/02/25    Expected End:  05/30/25            Patient will be able to ambulate for at least 10  minutes with < 3/10 pain to improve walking tolerance.  (Met)       Start:  05/02/25    Expected End:  05/30/25    Resolved:  05/29/25         Patient will improve knee range of motion to at least 0-110 degrees for improved functional performance.  (Met)       Start:  05/02/25    Expected End:  05/30/25    Resolved:  05/29/25             Alina Armstrong, PT

## 2025-06-03 ENCOUNTER — CLINICAL SUPPORT (OUTPATIENT)
Dept: REHABILITATION | Facility: HOSPITAL | Age: 75
End: 2025-06-03
Attending: UROLOGY
Payer: MEDICARE

## 2025-06-03 DIAGNOSIS — M25.661 DECREASED RANGE OF MOTION (ROM) OF RIGHT KNEE: Primary | ICD-10-CM

## 2025-06-03 DIAGNOSIS — M17.11 PRIMARY OSTEOARTHRITIS OF RIGHT KNEE: ICD-10-CM

## 2025-06-03 PROCEDURE — 97530 THERAPEUTIC ACTIVITIES: CPT

## 2025-06-03 RX ORDER — OXYCODONE HYDROCHLORIDE 5 MG/1
5 TABLET ORAL EVERY 8 HOURS PRN
Qty: 20 TABLET | Refills: 0 | Status: SHIPPED | OUTPATIENT
Start: 2025-06-03

## 2025-06-03 RX ORDER — METHOCARBAMOL 500 MG/1
500 TABLET, FILM COATED ORAL 4 TIMES DAILY
Qty: 56 TABLET | Refills: 0 | Status: SHIPPED | OUTPATIENT
Start: 2025-06-03 | End: 2025-06-17

## 2025-06-06 NOTE — PROGRESS NOTES
S:Sagrario Freire presents for post-operative evaluation.     DATE OF PROCEDURE: 4/30/2025   PROCEDURES PERFORMED:   Right total knee arthroplasty (CPT 49179)    DATE OF PROCEDURE: 4/14/2021   PROCEDURES PERFORMED:   Left total knee arthroplasty     Sagrario Freire reports to be doing well 5 weeks 6 days status post above procedure. Going to PT at [facility].  Doing well.  States the knee is feeling better and better.  She still has some pain for which she takes some oxycodone every now and then.  Otherwise getting better.  She has some baseline peripheral neuropathy for which he is taking Lyrica at baseline.  No increased neurogenic type complaints.  She reports steady progress in PT.    She states she had no pain yesterday.    O:  Exam of the right knee shows a well-healed midline incision.  No significant swelling.  No effusion.  Quad activates well.  Demonstrates a sensory near full active extension.  Perhaps lacks a few degrees.  Active assisted flexion to 120-125° with relative ease.  Central patellar tracking.  Stable to varus and valgus stress.  No mid flexion instability.  No hypersensitivity to touch.  No signs of infection.  Good quadriceps strength and activation    Imaging:  Plain radiographs of the bilateral knee demonstrate post operative right total knee arthroplasty prosthesis in place and intact without complications.     A/P:  Clinically the patient looks quite good.  Getting better.  Excellent range of motion.  No significant swelling.  No clinical concern for infection.  Discussed pain management strategy.  I would recommend stopping any oxycodone intake.  She does have some tramadol at home and will take that as needed and sparingly.  Continue Lyrica for baseline treatment of neuropathy symptoms.  I expect the knee to feel better as time goes on.  May begin the process of transitioning out of formal therapy to more of a home program.  We will see her back in 6 weeks for scheduled  repeat clinical and radiographic check.  Excellent job by Alina Armstrong and the PT team.

## 2025-06-10 ENCOUNTER — HOSPITAL ENCOUNTER (OUTPATIENT)
Dept: RADIOLOGY | Facility: HOSPITAL | Age: 75
Discharge: HOME | End: 2025-06-10
Attending: ORTHOPAEDIC SURGERY
Payer: MEDICARE

## 2025-06-10 ENCOUNTER — CLINICAL SUPPORT (OUTPATIENT)
Dept: REHABILITATION | Facility: HOSPITAL | Age: 75
End: 2025-06-10
Attending: UROLOGY
Payer: MEDICARE

## 2025-06-10 ENCOUNTER — OFFICE VISIT (OUTPATIENT)
Dept: SPORTS MEDICINE | Facility: CLINIC | Age: 75
End: 2025-06-10
Payer: MEDICARE

## 2025-06-10 VITALS
HEIGHT: 66 IN | SYSTOLIC BLOOD PRESSURE: 96 MMHG | BODY MASS INDEX: 21.7 KG/M2 | DIASTOLIC BLOOD PRESSURE: 64 MMHG | WEIGHT: 135.06 LBS | HEART RATE: 114 BPM

## 2025-06-10 DIAGNOSIS — M25.562 LEFT KNEE PAIN, UNSPECIFIED CHRONICITY: ICD-10-CM

## 2025-06-10 DIAGNOSIS — M25.661 DECREASED RANGE OF MOTION (ROM) OF RIGHT KNEE: Primary | ICD-10-CM

## 2025-06-10 DIAGNOSIS — Z09 SURGERY FOLLOW-UP EXAMINATION: Primary | ICD-10-CM

## 2025-06-10 DIAGNOSIS — Z96.651 S/P TOTAL KNEE ARTHROPLASTY, RIGHT: ICD-10-CM

## 2025-06-10 DIAGNOSIS — M25.561 RIGHT KNEE PAIN, UNSPECIFIED CHRONICITY: ICD-10-CM

## 2025-06-10 PROCEDURE — 73564 X-RAY EXAM KNEE 4 OR MORE: CPT | Mod: TC,50

## 2025-06-10 PROCEDURE — 99024 POSTOP FOLLOW-UP VISIT: CPT | Mod: S$GLB,,, | Performed by: ORTHOPAEDIC SURGERY

## 2025-06-10 PROCEDURE — 97530 THERAPEUTIC ACTIVITIES: CPT

## 2025-06-10 PROCEDURE — 3288F FALL RISK ASSESSMENT DOCD: CPT | Mod: CPTII,S$GLB,, | Performed by: ORTHOPAEDIC SURGERY

## 2025-06-10 PROCEDURE — 1101F PT FALLS ASSESS-DOCD LE1/YR: CPT | Mod: CPTII,S$GLB,, | Performed by: ORTHOPAEDIC SURGERY

## 2025-06-10 PROCEDURE — 3074F SYST BP LT 130 MM HG: CPT | Mod: CPTII,S$GLB,, | Performed by: ORTHOPAEDIC SURGERY

## 2025-06-10 PROCEDURE — 1159F MED LIST DOCD IN RCRD: CPT | Mod: CPTII,S$GLB,, | Performed by: ORTHOPAEDIC SURGERY

## 2025-06-10 PROCEDURE — 3051F HG A1C>EQUAL 7.0%<8.0%: CPT | Mod: CPTII,S$GLB,, | Performed by: ORTHOPAEDIC SURGERY

## 2025-06-10 PROCEDURE — 73564 X-RAY EXAM KNEE 4 OR MORE: CPT | Mod: 26,50,, | Performed by: RADIOLOGY

## 2025-06-10 PROCEDURE — 1126F AMNT PAIN NOTED NONE PRSNT: CPT | Mod: CPTII,S$GLB,, | Performed by: ORTHOPAEDIC SURGERY

## 2025-06-10 PROCEDURE — 99999 PR PBB SHADOW E&M-EST. PATIENT-LVL IV: CPT | Mod: PBBFAC,,, | Performed by: ORTHOPAEDIC SURGERY

## 2025-06-10 PROCEDURE — 3078F DIAST BP <80 MM HG: CPT | Mod: CPTII,S$GLB,, | Performed by: ORTHOPAEDIC SURGERY

## 2025-06-19 ENCOUNTER — OFFICE VISIT (OUTPATIENT)
Dept: SPORTS MEDICINE | Facility: CLINIC | Age: 75
End: 2025-06-19
Payer: MEDICARE

## 2025-06-19 ENCOUNTER — HOSPITAL ENCOUNTER (OUTPATIENT)
Dept: RADIOLOGY | Facility: HOSPITAL | Age: 75
Discharge: HOME | End: 2025-06-19
Attending: FAMILY MEDICINE
Payer: MEDICARE

## 2025-06-19 VITALS
HEIGHT: 66 IN | DIASTOLIC BLOOD PRESSURE: 71 MMHG | BODY MASS INDEX: 22.38 KG/M2 | HEART RATE: 92 BPM | SYSTOLIC BLOOD PRESSURE: 104 MMHG | WEIGHT: 139.25 LBS

## 2025-06-19 DIAGNOSIS — M67.80 TENDINOSIS: ICD-10-CM

## 2025-06-19 DIAGNOSIS — M19.011 OSTEOARTHRITIS OF RIGHT GLENOHUMERAL JOINT: ICD-10-CM

## 2025-06-19 DIAGNOSIS — M67.911 DYSFUNCTION OF RIGHT ROTATOR CUFF: ICD-10-CM

## 2025-06-19 DIAGNOSIS — G89.29 CHRONIC RIGHT SHOULDER PAIN: Primary | ICD-10-CM

## 2025-06-19 DIAGNOSIS — M25.511 RIGHT SHOULDER PAIN, UNSPECIFIED CHRONICITY: ICD-10-CM

## 2025-06-19 DIAGNOSIS — S46.819S SPRAIN OF SUPRASPINATUS MUSCLE OR TENDON, UNSPECIFIED LATERALITY, SEQUELA: ICD-10-CM

## 2025-06-19 DIAGNOSIS — M25.511 CHRONIC RIGHT SHOULDER PAIN: Primary | ICD-10-CM

## 2025-06-19 PROCEDURE — 99999 PR PBB SHADOW E&M-EST. PATIENT-LVL III: CPT | Mod: PBBFAC,,, | Performed by: FAMILY MEDICINE

## 2025-06-19 PROCEDURE — 73030 X-RAY EXAM OF SHOULDER: CPT | Mod: 26,RT,, | Performed by: RADIOLOGY

## 2025-06-19 PROCEDURE — 73030 X-RAY EXAM OF SHOULDER: CPT | Mod: TC,RT

## 2025-06-19 RX ORDER — TRIAMCINOLONE ACETONIDE 40 MG/ML
20 INJECTION, SUSPENSION INTRA-ARTICULAR; INTRAMUSCULAR
Status: DISCONTINUED | OUTPATIENT
Start: 2025-06-19 | End: 2025-06-19 | Stop reason: HOSPADM

## 2025-06-19 RX ADMIN — TRIAMCINOLONE ACETONIDE 20 MG: 40 INJECTION, SUSPENSION INTRA-ARTICULAR; INTRAMUSCULAR at 10:06

## 2025-06-19 NOTE — PROCEDURES
"Large Joint Aspiration/Injection: R glenohumeral    Date/Time: 6/19/2025 10:30 AM    Performed by: Kendrick Freire MD  Authorized by: Kendrick Freire MD    Consent Done?:  Yes (Verbal)  Indications:  Pain  Site marked: the procedure site was marked    Timeout: prior to procedure the correct patient, procedure, and site was verified    Prep: patient was prepped and draped in usual sterile fashion      Details:  Needle Size:  22 G  Ultrasonic Guidance for needle placement?: Yes    Images are saved and documented.  Approach:  Posterior  Location:  Shoulder  Site:  R glenohumeral  Medications:  20 mg triamcinolone acetonide 40 mg/mL  Patient tolerance:  Patient tolerated the procedure well with no immediate complications     Description of ultrasound utilization for needle guidance:   Ultrasound guidance used for needle localization. Images saved and stored for documentation. The glenohumeral joint was visualized. Dynamic visualization of the 22g x 3.5" needle was continuous throughout the procedure.     "

## 2025-06-19 NOTE — PROCEDURES
"Tendon Origin    Date/Time: 6/19/2025 10:30 AM    Performed by: Kendrick Freire MD  Authorized by: Kendrick Freire MD    Consent Done?:  Yes (Verbal)  Timeout: prior to procedure the correct patient, procedure, and site was verified    Indications:  Pain  Site marked: the procedure site was marked    Timeout: prior to procedure the correct patient, procedure, and site was verified    Location: shoulder.  Prep: patient was prepped and draped in usual sterile fashion    Ultrasonic Guidance for Needle Placement?: Yes    Needle size:  25 G  Approach:  Posterolateral  Medications:  20 mg triamcinolone acetonide 40 mg/mL  Patient tolerance:  Patient tolerated the procedure well with no immediate complications   Supraspinatus tendon and tendon insertion injection RIGHT    Description of ultrasound utilization for needle guidance:   Ultrasound guidance used for needle localization. Images saved and stored for documentation. The supraspinatus muscle, myotendinous junction, and tendon insertion on the greater tuberosity of the humeral head were visualized. Dynamic visualization of the 22g x 1.5" needle was continuous throughout the procedure.    "

## 2025-06-19 NOTE — PROGRESS NOTES
CC: Shoulder pain, right    HISTORY OF PRESENT ILLNESS    6/19/25  Last injection therapy worked very well  Requesting f/u evaluation and re injection, if appropriate  No new sx    25.04.21  Last injection therapy worked very well.    Requesting f/u evaluation and re injection, if appropriate. Pt is scheduled for R TKA 4/30/25 and is concerned her sholder pain will limit her ability to use a walker and ambulate post-operatively. Pt was evaluated today by Mady Maxwell PA-C and requested re-evaluation and CSI if appropriate.  No new sx     Sagrario Freire, a 75 y.o. female, presents today for evaluation of her right SHOULDER. Patient is xxx hand dominant.  History of Present Illness (HPI)  #1  Location: lat shoulder, right  Onset duration: insidious  Palliative modifying factors: rel rest, OTC analgesics  Provocative modifying factors: GH ABduction  Prior: none  Progression: plateau discomfort  Quality: sharp pain  Radiation [associated signs and symptoms]: none  Severity: per nursing documentation  Timing: intermittent w/ use  Trauma:   AAFP/FPM: Pocket Guide Documentation Guidelines, (c)2014    (35178=2+, 93909=7+)    Review of systems (ROS):  A 10+ review of systems was performed with pertinent positives and negatives noted above in the history of present illness. Other systems were negative unless otherwise specified.    PHYSICAL EXAMINATION  General:  The patient is alert and oriented x 3. Mood is pleasant. Observation of ears, eyes and nose reveal no gross abnormalities. HEENT: NCAT, sclera anicteric. Lungs: Respirations are equal and unlabored.     SHOULDER EXAMINATION     OBSERVATION:     Swelling  none  Deformity  none   Discoloration  none   Scapular winging none   Scars   none  Atrophy  none    TENDERNESS / CREPITUS (T/C):          T/C      T/C   Clavicle   -/-  SUPRAspinatus    -/-     AC Jt.    -/-  INFRAspinatus  -/-    SC Jt.    -/-  Deltoid    -/-      G. Tuberosity  -/-  LH BICEP  groove  -/-   Acromion:  -/-  Midline Neck   -/-     Scapular Spine -/-  Trapezium   -/-   SMA Scapula  -/-  GH jt. line - post  -/-     Scapulothoracic  -/-         ROM:     Right shoulder   Left shoulder        AROM (PROM)   AROM (PROM)   FE    170° (175°)     170° (175°)     ER at 0°    60°  (65°)    60°  (65°)   ER at 90° ABD  90°  (90°)    90°  (90°)   IR at 90°  ABD   NA  (40°)     NA  (40°)      IR (spine level)   T10     T10    STRENGTH: (* = with pain) RIGHT SHOULDER  LEFT SHOULDER   SCAPTION   5/5    5/5    IR    5/5    5/5   ER    5/5    5/5   BICEPS   5/5    5/5   Deltoid    5/5    5/5     SIGNS:  Painful side       NEER   -   OKELLIES        -    DIANA   -   SPEEDS        -   DROP ARM   -   BELLY PRESS       -    X-Body ADD    -   LIFT-OFF        -   HORNBLOWERS      -              STABILITY TESTING   RIGHT SHOULDER  LEFT SHOULDER     Translation     Anterior up face    up face    Posterior up face   up face    Sulcus  < 10mm   < 10 mm     Signs   Apprehension   neg     neg       Relocation   no change    no change      Jerk test  neg    neg    EXTREMITY NEURO-VASCULAR EXAM    Sensation grossly intact to light touch all dermatomal regions.    DTR 2+ Biceps, Triceps, BR and Negative Andriys sign   Grossly intact motor function at Elbow, Wrist and Hand   Distal pulses radial and ulnar 2+, brisk cap refill, symmetric.      NECK:  Painless FROM and spinous processes non-tender. Negative Spurlings sign.       Other Findings:    ASSESSMENT & PLAN  Assessment  #1 Osteoarthritis of glenohumeral joint, right, dominant   W/ supraspinatus tendinosis    No evidence of neurologic pathology  No evidence of vascular pathology    Imaging studies reviewed:   X-ray shoulder, right 25.06    Plan    We discussed options including    Watchful waiting / relative rest    Physical therapy X   Injection therapy CSI iagh right  CSI supra spin tend right   Consultation    The patient chooses As above   x =  prescribed  CSI = corticosteroid injection  VSI = viscosupplement injection  PRPI = platelet rich plasma injection  ia = intra articular  R = right  L = left  B = bilateral   nfSx = surgical consultation was recommended, but patient is not interested in consultation at this time    Physical Therapy        Formal (fPT), @ [facility] facility p   Formal (fPT), @ [facility] facility        Homegoing (hgPT), per concurrent fPT recommendations    Homegoing (hgPT), per prior fPT recommendations t   Homegoing (hgPT), handout provided        w/  (atPT)    [blank] = not prescribed  x = prescribed  b = prescribed, and begin as indicated  t = continue as indicated  r = prescribed, and restart as indicated  p = completed prior as indicated  hs = prescribed, and with high school   col = prescribed, and with college or university   nfPT = physical therapy was recommended, but patient is not interested in PT at this time    Activity (e.g. sports, work) restrictions    [blank] = as tolerated  pt = per physical therapist  at = per   NWB = non weight bearing on affected lower extremity, with crutches assistance for ambulation    Bracing    [blank] = not prescribed  r = recommended, but not fit with at todays visit  f = prescribed and fit with at todays visit  t = continue as indicated  d = d/c  p = as needed  rare = use on rare, as-needed basis; advised against chronic use    Pain management    [blank] = No prescription necessary. A handout detailing dosing of appropriate   over-the-counter musculoskeletal analgesics was made available to the patient.   m = meloxicam x 14 days  mp = 14 day course of meloxicam prescribed prior    Follow up 12w  Review MRI   [blank] = as needed  [number] = in [number] weeks  CSI = for corticosteroid injection  VSI = for viscosupplement injection or injection series  PRP = for platelet rich plasma injection or injection series  MRI = after MRI  imaging  ns = should surgical options be deferred (no surgery)  o = appointment offered, deferred by patient    Should symptoms worsen or fail to resolve, consider    Revisiting the above options and / or      Vocation:

## 2025-06-24 NOTE — PROGRESS NOTES
Outpatient Rehab    Physical Therapy Discharge    Patient Name: Sagrario Freire  MRN: [id number]  YOB: 1950  Encounter Date: 6/10/2025    Therapy Diagnosis:   Encounter Diagnosis   Name Primary?    Decreased range of motion (ROM) of right knee Yes     Physician: Hans Brice*    Physician Orders: Eval and Treat  Medical Diagnosis: Unilateral primary osteoarthritis, right knee  Surgical Diagnosis: Not applicable for this Episode   Surgical Date: Not applicable for this Episode  Days Since Last Surgery: Not applicable for this Episode    Visit # / Visits Authorized:  13 / 20  Insurance Authorization Period: 4/24/2025 to 12/31/2025  Date of Evaluation: 5/2/2025  Plan of Care Certification: 5/2/2025 to 6/27/2025      PT/PTA: PT   Number of PTA visits since last PT visit:0  Time In: 1000   Time Out: 1023  Total Time (in minutes): 23   Total Billable Time (in minutes): 23    FOTO:  Intake Score:  %  Survey Score 2:  %  Survey Score 3:  %    Precautions:       Subjective   patient reports that she is doing well and not having any problems. She reports that she was able to stop taking her pain medication over the weekend for the first time. Patient states that she is pleased with her progress and knows that it might take some more time to get this completely better..  Pain reported as 3/10.      Objective            Treatment:  Therapeutic Activity  TA 1: Discharge Summary    Time Entry(in minutes):  Therapeutic Activity Time Entry: 23    Assessment & Plan   Assessment: Sagrario has attended physical therapy for 13 visits for status post tka and has met nearly all therapy goals. She has demonstrated improvement with range of motion, strength, FOTO score, TUG score, and 30 second sit to stand. Patient's main functional limitation remains gross strength due to post surgical status and is expected at this time. Patient is appropriate for discharge at this time and plans to attend local gym to continue  with progressions made during therapy. Patient provided comprehensive home exercise program and given resistance bands to continue with at home program as well. All questions were answered and concerns were addressed. Patient discharged this date.         The patient's spiritual, cultural, and educational needs were considered, and the patient is agreeable to the plan of care and goals.           Plan: discharge this date    Goals:   Active       SHORT TERM GOALS       Patient will reduce maximal pain rating to < 3/10 pain to facilitate ability to sleep through the night and recover from PT interventions. (Unable to Meet)       Start:  05/02/25    Expected End:  05/30/25            Patient will be able to ambulate for at least 10 minutes with < 3/10 pain to improve walking tolerance.  (Met)       Start:  05/02/25    Expected End:  05/30/25    Resolved:  05/29/25         Patient will improve knee range of motion to at least 0-110 degrees for improved functional performance.  (Met)       Start:  05/02/25    Expected End:  05/30/25    Resolved:  05/29/25           Resolved       LONG TERM GOALS       Patient will be able to complete the TUG in </= 10 seconds with the least restrictive assistive device to decrease fall risk.  (Met)       Start:  05/02/25    Expected End:  06/27/25    Resolved:  05/29/25          Patient will demonstrate >/= 4/5 lower quarter strength to facilitate transfers from sit to stand from various surfaces without restriction. (Met)       Start:  05/02/25    Expected End:  06/27/25    Resolved:  06/24/25         Patient will improve 30 second sit to stand to at least 10x for improvement with transfer tasks.   (Met)       Start:  05/02/25    Expected End:  06/27/25    Resolved:  05/29/25             Alina Armstrong, PT

## 2025-07-28 ENCOUNTER — TELEPHONE (OUTPATIENT)
Dept: SPORTS MEDICINE | Facility: CLINIC | Age: 75
End: 2025-07-28
Payer: MEDICARE

## 2025-07-28 ENCOUNTER — PATIENT MESSAGE (OUTPATIENT)
Dept: SPORTS MEDICINE | Facility: CLINIC | Age: 75
End: 2025-07-28
Payer: MEDICARE

## 2025-07-28 NOTE — TELEPHONE ENCOUNTER
Called patient regarding her [employer] message asking for a returned phone call for scheduling her MRI appointment. Was able to get her scheduled and confirmed date, time, and location with patient.       Reagan Gillespie MS, OTC  Clinical Assistant to Dr. Kendrick Freire MD  [facility]

## 2025-08-07 ENCOUNTER — HOSPITAL ENCOUNTER (OUTPATIENT)
Dept: RADIOLOGY | Facility: HOSPITAL | Age: 75
Discharge: HOME | End: 2025-08-07
Attending: FAMILY MEDICINE
Payer: MEDICARE

## 2025-08-07 DIAGNOSIS — S46.819S SPRAIN OF SUPRASPINATUS MUSCLE OR TENDON, UNSPECIFIED LATERALITY, SEQUELA: ICD-10-CM

## 2025-08-07 DIAGNOSIS — M25.511 CHRONIC RIGHT SHOULDER PAIN: ICD-10-CM

## 2025-08-07 DIAGNOSIS — M19.011 OSTEOARTHRITIS OF RIGHT GLENOHUMERAL JOINT: ICD-10-CM

## 2025-08-07 DIAGNOSIS — M67.911 DYSFUNCTION OF RIGHT ROTATOR CUFF: ICD-10-CM

## 2025-08-07 DIAGNOSIS — G89.29 CHRONIC RIGHT SHOULDER PAIN: ICD-10-CM

## 2025-08-07 PROCEDURE — 73221 MRI JOINT UPR EXTREM W/O DYE: CPT | Mod: 26,RT,, | Performed by: INTERNAL MEDICINE

## 2025-08-07 PROCEDURE — 73221 MRI JOINT UPR EXTREM W/O DYE: CPT | Mod: TC,RT

## (undated) DEVICE — KIT TOTAL KNEE TKOFG

## (undated) DEVICE — BOWL CEMENT

## (undated) DEVICE — SOL BETADINE 5%

## (undated) DEVICE — KIT TOTAL KNEE TKOFG OMC

## (undated) DEVICE — HOOD T7 W/ PEEL AWAY LENS

## (undated) DEVICE — BLADE SAW STERN .076MM 6.1MM L

## (undated) DEVICE — SPONGE GAUZE 16PLY 4X4

## (undated) DEVICE — PIN FIX TEMP 1/8X2.5IN LF STER
Type: IMPLANTABLE DEVICE | Site: KNEE | Status: NON-FUNCTIONAL
Removed: 2021-04-14

## (undated) DEVICE — SUT 0 VICRYL / CT-1

## (undated) DEVICE — SUT VICRYL BR 1 GEN 27 CT-1

## (undated) DEVICE — SEE MEDLINE ITEM 146298

## (undated) DEVICE — BANDAGE ESMARK 6X12

## (undated) DEVICE — ADHESIVE DERMABOND ADVANCED

## (undated) DEVICE — COVER CAMERA OPERATING ROOM

## (undated) DEVICE — TOURNIQUET SB QC DP 34X4IN

## (undated) DEVICE — BLADE SAGITTAL 18 X 1.27 X 90M

## (undated) DEVICE — SEE MEDLINE ITEM 154981

## (undated) DEVICE — SOL NACL IRR 1000ML BTL

## (undated) DEVICE — STAPLER SKIN REGULAR

## (undated) DEVICE — STAPLER SKIN PROXIMATE WIDE

## (undated) DEVICE — SUT VICRYL 3-0 27 SH

## (undated) DEVICE — MASK FLYTE HOOD PEEL AWAY

## (undated) DEVICE — DRAPE STERI INSTRUMENT 1018

## (undated) DEVICE — UNDERGLOVES BIOGEL PI SIZE 8.5

## (undated) DEVICE — GLOVE BIOGEL SKINSENSE PI 8.0

## (undated) DEVICE — PUMP COLD THERAPY

## (undated) DEVICE — SOL IRR NACL .9% 3000ML

## (undated) DEVICE — DRESSING AQUACEL AG ADV 3.5X12

## (undated) DEVICE — ALCOHOL 70% ISO RUBBING 16OZ

## (undated) DEVICE — WRAP PROTECTIVE LEG POS STRL

## (undated) DEVICE — WRAP KNEE ACCU THERM GEL PACK

## (undated) DEVICE — DRAPE STERI U-SHAPED 47X51IN

## (undated) DEVICE — PAD COLD THERAPY KNEE WRAP ON

## (undated) DEVICE — KIT IRR SUCTION HND PIECE

## (undated) DEVICE — ELECTRODE REM PLYHSV RETURN 9

## (undated) DEVICE — TOWEL OR DISP STRL BLUE 4/PK

## (undated) DEVICE — SUT MONOCRYL 3-0 PS-1

## (undated) DEVICE — GOWN ECLIPSE REINF LV4 XLNG XL

## (undated) DEVICE — SUT VICRYL PLUS ANTIBACT

## (undated) DEVICE — SOL NACL IRR 3000ML

## (undated) DEVICE — SEE MEDLINE ITEM 152487

## (undated) DEVICE — TAPE SURG DURAPORE 2 X10YD

## (undated) DEVICE — MIXER BONE CEMENT

## (undated) DEVICE — PAD CAST SPECIALIST STRL 6